# Patient Record
Sex: FEMALE | Race: WHITE | NOT HISPANIC OR LATINO | Employment: FULL TIME | URBAN - METROPOLITAN AREA
[De-identification: names, ages, dates, MRNs, and addresses within clinical notes are randomized per-mention and may not be internally consistent; named-entity substitution may affect disease eponyms.]

---

## 2017-05-05 ENCOUNTER — TRANSCRIBE ORDERS (OUTPATIENT)
Dept: MAMMOGRAPHY | Facility: HOSPITAL | Age: 52
End: 2017-05-05

## 2017-05-05 DIAGNOSIS — Z12.31 VISIT FOR SCREENING MAMMOGRAM: Primary | ICD-10-CM

## 2017-05-30 ENCOUNTER — HOSPITAL ENCOUNTER (OUTPATIENT)
Dept: MAMMOGRAPHY | Facility: HOSPITAL | Age: 52
Discharge: HOME | End: 2017-05-30
Attending: OBSTETRICS & GYNECOLOGY | Admitting: OBSTETRICS & GYNECOLOGY

## 2017-05-30 DIAGNOSIS — Z12.31 VISIT FOR SCREENING MAMMOGRAM: ICD-10-CM

## 2017-05-30 PROCEDURE — 77063 BREAST TOMOSYNTHESIS BI: CPT

## 2017-05-30 PROCEDURE — 77063 BREAST TOMOSYNTHESIS BI: CPT | Performed by: RADIOLOGY

## 2017-05-30 PROCEDURE — 77067 SCR MAMMO BI INCL CAD: CPT | Performed by: RADIOLOGY

## 2017-05-30 PROCEDURE — G0202 SCR MAMMO BI INCL CAD: HCPCS

## 2018-05-09 ENCOUNTER — TRANSCRIBE ORDERS (OUTPATIENT)
Dept: MAMMOGRAPHY | Facility: HOSPITAL | Age: 53
End: 2018-05-09

## 2018-05-09 DIAGNOSIS — Z12.31 VISIT FOR SCREENING MAMMOGRAM: Primary | ICD-10-CM

## 2018-05-31 ENCOUNTER — HOSPITAL ENCOUNTER (OUTPATIENT)
Dept: MAMMOGRAPHY | Facility: HOSPITAL | Age: 53
Discharge: HOME | End: 2018-05-31
Attending: OBSTETRICS & GYNECOLOGY | Admitting: OBSTETRICS & GYNECOLOGY

## 2018-05-31 DIAGNOSIS — Z12.31 VISIT FOR SCREENING MAMMOGRAM: ICD-10-CM

## 2018-05-31 PROCEDURE — 77063 BREAST TOMOSYNTHESIS BI: CPT | Performed by: RADIOLOGY

## 2018-05-31 PROCEDURE — 77067 SCR MAMMO BI INCL CAD: CPT | Performed by: RADIOLOGY

## 2018-05-31 PROCEDURE — 77063 BREAST TOMOSYNTHESIS BI: CPT

## 2018-05-31 PROCEDURE — 77067 SCR MAMMO BI INCL CAD: CPT

## 2019-06-05 ENCOUNTER — TRANSCRIBE ORDERS (OUTPATIENT)
Dept: ADMINISTRATIVE | Facility: HOSPITAL | Age: 54
End: 2019-06-05

## 2019-06-05 DIAGNOSIS — Z12.31 VISIT FOR SCREENING MAMMOGRAM: Primary | ICD-10-CM

## 2019-06-18 ENCOUNTER — HOSPITAL ENCOUNTER (OUTPATIENT)
Dept: MAMMOGRAPHY | Facility: HOSPITAL | Age: 54
Discharge: HOME | End: 2019-06-18
Admitting: OBSTETRICS & GYNECOLOGY

## 2019-06-18 DIAGNOSIS — Z12.31 VISIT FOR SCREENING MAMMOGRAM: ICD-10-CM

## 2019-06-18 PROCEDURE — 77063 BREAST TOMOSYNTHESIS BI: CPT | Performed by: RADIOLOGY

## 2019-06-18 PROCEDURE — 77067 SCR MAMMO BI INCL CAD: CPT | Performed by: RADIOLOGY

## 2019-06-18 PROCEDURE — 77063 BREAST TOMOSYNTHESIS BI: CPT

## 2019-06-18 PROCEDURE — 77067 SCR MAMMO BI INCL CAD: CPT

## 2019-09-10 ENCOUNTER — ANESTHESIA EVENT (OUTPATIENT)
Dept: PERIOP | Facility: HOSPITAL | Age: 54
End: 2019-09-10

## 2019-09-10 ENCOUNTER — PREP FOR SURGERY (OUTPATIENT)
Dept: OTHER | Facility: HOSPITAL | Age: 54
End: 2019-09-10

## 2019-09-10 ENCOUNTER — APPOINTMENT (OUTPATIENT)
Dept: PREADMISSION TESTING | Facility: HOSPITAL | Age: 54
End: 2019-09-10

## 2019-09-10 VITALS — BODY MASS INDEX: 37.94 KG/M2 | HEIGHT: 64 IN | WEIGHT: 222.22 LBS

## 2019-09-10 DIAGNOSIS — N94.6 DYSMENORRHEA: Primary | ICD-10-CM

## 2019-09-10 DIAGNOSIS — N94.6 DYSMENORRHEA: ICD-10-CM

## 2019-09-10 LAB
ABO GROUP BLD: NORMAL
ALBUMIN SERPL-MCNC: 4.3 G/DL (ref 3.5–5.2)
ALBUMIN/GLOB SERPL: 1.4 G/DL
ALP SERPL-CCNC: 122 U/L (ref 39–117)
ALT SERPL W P-5'-P-CCNC: 40 U/L (ref 1–33)
ANION GAP SERPL CALCULATED.3IONS-SCNC: 13 MMOL/L (ref 5–15)
AST SERPL-CCNC: 22 U/L (ref 1–32)
BASOPHILS # BLD AUTO: 0.02 10*3/MM3 (ref 0–0.2)
BASOPHILS NFR BLD AUTO: 0.2 % (ref 0–1.5)
BILIRUB SERPL-MCNC: 0.4 MG/DL (ref 0.2–1.2)
BLD GP AB SCN SERPL QL: NEGATIVE
BUN BLD-MCNC: 19 MG/DL (ref 6–20)
BUN/CREAT SERPL: 29.2 (ref 7–25)
CALCIUM SPEC-SCNC: 9.1 MG/DL (ref 8.6–10.5)
CHLORIDE SERPL-SCNC: 97 MMOL/L (ref 98–107)
CO2 SERPL-SCNC: 28 MMOL/L (ref 22–29)
CREAT BLD-MCNC: 0.65 MG/DL (ref 0.57–1)
DEPRECATED RDW RBC AUTO: 40.4 FL (ref 37–54)
EOSINOPHIL # BLD AUTO: 0.16 10*3/MM3 (ref 0–0.4)
EOSINOPHIL NFR BLD AUTO: 1.5 % (ref 0.3–6.2)
ERYTHROCYTE [DISTWIDTH] IN BLOOD BY AUTOMATED COUNT: 13 % (ref 12.3–15.4)
GFR SERPL CREATININE-BSD FRML MDRD: 96 ML/MIN/1.73
GLOBULIN UR ELPH-MCNC: 3 GM/DL
GLUCOSE BLD-MCNC: 109 MG/DL (ref 65–99)
HCG INTACT+B SERPL-ACNC: <0.5 MIU/ML
HCT VFR BLD AUTO: 44.1 % (ref 34–46.6)
HGB BLD-MCNC: 14.3 G/DL (ref 12–15.9)
IMM GRANULOCYTES # BLD AUTO: 0.05 10*3/MM3 (ref 0–0.05)
IMM GRANULOCYTES NFR BLD AUTO: 0.5 % (ref 0–0.5)
LYMPHOCYTES # BLD AUTO: 2.09 10*3/MM3 (ref 0.7–3.1)
LYMPHOCYTES NFR BLD AUTO: 20.2 % (ref 19.6–45.3)
MCH RBC QN AUTO: 27.8 PG (ref 26.6–33)
MCHC RBC AUTO-ENTMCNC: 32.4 G/DL (ref 31.5–35.7)
MCV RBC AUTO: 85.8 FL (ref 79–97)
MONOCYTES # BLD AUTO: 0.85 10*3/MM3 (ref 0.1–0.9)
MONOCYTES NFR BLD AUTO: 8.2 % (ref 5–12)
NEUTROPHILS # BLD AUTO: 7.16 10*3/MM3 (ref 1.7–7)
NEUTROPHILS NFR BLD AUTO: 69.4 % (ref 42.7–76)
NRBC BLD AUTO-RTO: 0 /100 WBC (ref 0–0.2)
PLATELET # BLD AUTO: 275 10*3/MM3 (ref 140–450)
PMV BLD AUTO: 9.8 FL (ref 6–12)
POTASSIUM BLD-SCNC: 4.1 MMOL/L (ref 3.5–5.2)
PROT SERPL-MCNC: 7.3 G/DL (ref 6–8.5)
RBC # BLD AUTO: 5.14 10*6/MM3 (ref 3.77–5.28)
RH BLD: POSITIVE
SODIUM BLD-SCNC: 138 MMOL/L (ref 136–145)
T&S EXPIRATION DATE: NORMAL
WBC NRBC COR # BLD: 10.33 10*3/MM3 (ref 3.4–10.8)

## 2019-09-10 PROCEDURE — 86901 BLOOD TYPING SEROLOGIC RH(D): CPT | Performed by: OBSTETRICS & GYNECOLOGY

## 2019-09-10 PROCEDURE — 86850 RBC ANTIBODY SCREEN: CPT | Performed by: OBSTETRICS & GYNECOLOGY

## 2019-09-10 PROCEDURE — 93005 ELECTROCARDIOGRAM TRACING: CPT

## 2019-09-10 PROCEDURE — 84702 CHORIONIC GONADOTROPIN TEST: CPT | Performed by: OBSTETRICS & GYNECOLOGY

## 2019-09-10 PROCEDURE — 80053 COMPREHEN METABOLIC PANEL: CPT | Performed by: OBSTETRICS & GYNECOLOGY

## 2019-09-10 PROCEDURE — 85025 COMPLETE CBC W/AUTO DIFF WBC: CPT | Performed by: OBSTETRICS & GYNECOLOGY

## 2019-09-10 PROCEDURE — 86900 BLOOD TYPING SEROLOGIC ABO: CPT | Performed by: OBSTETRICS & GYNECOLOGY

## 2019-09-10 PROCEDURE — 36415 COLL VENOUS BLD VENIPUNCTURE: CPT

## 2019-09-10 RX ORDER — SODIUM CHLORIDE 0.9 % (FLUSH) 0.9 %
3-10 SYRINGE (ML) INJECTION AS NEEDED
Status: CANCELLED | OUTPATIENT
Start: 2019-09-10

## 2019-09-10 RX ORDER — POTASSIUM CHLORIDE 1500 MG/1
20 TABLET, FILM COATED, EXTENDED RELEASE ORAL EVERY MORNING
COMMUNITY
End: 2022-08-19

## 2019-09-10 RX ORDER — UREA 10 %
LOTION (ML) TOPICAL
COMMUNITY

## 2019-09-10 RX ORDER — NAPROXEN 500 MG/1
500 TABLET ORAL 2 TIMES DAILY PRN
COMMUNITY
End: 2023-02-20 | Stop reason: SDUPTHER

## 2019-09-10 RX ORDER — SODIUM CHLORIDE 0.9 % (FLUSH) 0.9 %
3 SYRINGE (ML) INJECTION EVERY 12 HOURS SCHEDULED
Status: CANCELLED | OUTPATIENT
Start: 2019-09-10

## 2019-09-10 RX ORDER — CEFAZOLIN SODIUM 2 G/100ML
2 INJECTION, SOLUTION INTRAVENOUS ONCE
Status: CANCELLED | OUTPATIENT
Start: 2019-09-10 | End: 2019-09-10

## 2019-09-10 RX ORDER — ASPIRIN 81 MG/1
81 TABLET ORAL EVERY MORNING
COMMUNITY
End: 2019-09-12 | Stop reason: HOSPADM

## 2019-09-10 RX ORDER — CYCLOSPORINE 0.5 MG/ML
1 EMULSION OPHTHALMIC 2 TIMES DAILY
COMMUNITY

## 2019-09-10 RX ORDER — TRIAMTERENE AND HYDROCHLOROTHIAZIDE 37.5; 25 MG/1; MG/1
1 TABLET ORAL EVERY MORNING
COMMUNITY
End: 2022-08-09

## 2019-09-10 RX ORDER — ROPINIROLE 2 MG/1
2 TABLET, FILM COATED ORAL 2 TIMES DAILY
COMMUNITY
End: 2022-08-19 | Stop reason: SDUPTHER

## 2019-09-10 RX ORDER — OLOPATADINE HYDROCHLORIDE 2 MG/ML
1 SOLUTION/ DROPS OPHTHALMIC DAILY
COMMUNITY
End: 2022-08-19 | Stop reason: SDUPTHER

## 2019-09-10 RX ORDER — SODIUM CHLORIDE, SODIUM LACTATE, POTASSIUM CHLORIDE, CALCIUM CHLORIDE 600; 310; 30; 20 MG/100ML; MG/100ML; MG/100ML; MG/100ML
100 INJECTION, SOLUTION INTRAVENOUS CONTINUOUS
Status: CANCELLED | OUTPATIENT
Start: 2019-09-10

## 2019-09-10 NOTE — PAT
"Patient to apply Chlorhexadine wipes  to surgical area (as instructed) the night before procedure and the AM of procedure. Wipes provided.    Orders were for \"C/S\"- patient stated she is scheduled for a hysterectomy.  Chasidy was contacted in Dr. Mac's office.  Chasidy stated she will work on getting the orders corrected in Epic.  Note left on chart for patient to sign corrected consent in pre-op.   "

## 2019-09-11 ENCOUNTER — ANESTHESIA (OUTPATIENT)
Dept: PERIOP | Facility: HOSPITAL | Age: 54
End: 2019-09-11

## 2019-09-11 ENCOUNTER — HOSPITAL ENCOUNTER (OUTPATIENT)
Facility: HOSPITAL | Age: 54
Discharge: HOME | End: 2019-09-12
Attending: OBSTETRICS & GYNECOLOGY | Admitting: OBSTETRICS & GYNECOLOGY

## 2019-09-11 DIAGNOSIS — N80.9 ENDOMETRIOSIS: ICD-10-CM

## 2019-09-11 DIAGNOSIS — N94.6 DYSMENORRHEA: ICD-10-CM

## 2019-09-11 PROCEDURE — C1765 ADHESION BARRIER: HCPCS | Performed by: OBSTETRICS & GYNECOLOGY

## 2019-09-11 PROCEDURE — 25010000003 CEFAZOLIN IN DEXTROSE 2-4 GM/100ML-% SOLUTION: Performed by: OBSTETRICS & GYNECOLOGY

## 2019-09-11 PROCEDURE — 25010000002 HYDROMORPHONE PER 4 MG: Performed by: NURSE ANESTHETIST, CERTIFIED REGISTERED

## 2019-09-11 PROCEDURE — 25010000002 PROPOFOL 10 MG/ML EMULSION: Performed by: NURSE ANESTHETIST, CERTIFIED REGISTERED

## 2019-09-11 PROCEDURE — 25010000002 ONDANSETRON PER 1 MG: Performed by: NURSE ANESTHETIST, CERTIFIED REGISTERED

## 2019-09-11 PROCEDURE — 88307 TISSUE EXAM BY PATHOLOGIST: CPT | Performed by: OBSTETRICS & GYNECOLOGY

## 2019-09-11 PROCEDURE — 25010000002 KETOROLAC TROMETHAMINE PER 15 MG: Performed by: NURSE ANESTHETIST, CERTIFIED REGISTERED

## 2019-09-11 PROCEDURE — 25010000002 PROMETHAZINE PER 50 MG: Performed by: NURSE ANESTHETIST, CERTIFIED REGISTERED

## 2019-09-11 PROCEDURE — 25010000002 FENTANYL CITRATE (PF) 100 MCG/2ML SOLUTION: Performed by: NURSE ANESTHETIST, CERTIFIED REGISTERED

## 2019-09-11 PROCEDURE — 25010000002 HYDROMORPHONE HCL-NACL 30-0.9 MG/30ML-% SOLUTION PREFILLED SYRINGE: Performed by: OBSTETRICS & GYNECOLOGY

## 2019-09-11 PROCEDURE — 25010000003 LIDOCAINE 1 % SOLUTION: Performed by: NURSE ANESTHETIST, CERTIFIED REGISTERED

## 2019-09-11 PROCEDURE — 25010000002 DEXAMETHASONE PER 1 MG: Performed by: NURSE ANESTHETIST, CERTIFIED REGISTERED

## 2019-09-11 RX ORDER — SODIUM CHLORIDE 0.9 % (FLUSH) 0.9 %
3-10 SYRINGE (ML) INJECTION AS NEEDED
Status: DISCONTINUED | OUTPATIENT
Start: 2019-09-11 | End: 2019-09-11 | Stop reason: HOSPADM

## 2019-09-11 RX ORDER — PROMETHAZINE HYDROCHLORIDE 25 MG/1
25 SUPPOSITORY RECTAL ONCE AS NEEDED
Status: DISCONTINUED | OUTPATIENT
Start: 2019-09-11 | End: 2019-09-11 | Stop reason: HOSPADM

## 2019-09-11 RX ORDER — ONDANSETRON 2 MG/ML
4 INJECTION INTRAMUSCULAR; INTRAVENOUS EVERY 6 HOURS PRN
Status: DISCONTINUED | OUTPATIENT
Start: 2019-09-11 | End: 2019-09-12 | Stop reason: HOSPADM

## 2019-09-11 RX ORDER — SODIUM CHLORIDE, SODIUM LACTATE, POTASSIUM CHLORIDE, CALCIUM CHLORIDE 600; 310; 30; 20 MG/100ML; MG/100ML; MG/100ML; MG/100ML
100 INJECTION, SOLUTION INTRAVENOUS CONTINUOUS
Status: DISCONTINUED | OUTPATIENT
Start: 2019-09-11 | End: 2019-09-11 | Stop reason: HOSPADM

## 2019-09-11 RX ORDER — BUPIVACAINE HYDROCHLORIDE AND EPINEPHRINE 5; 5 MG/ML; UG/ML
INJECTION, SOLUTION PERINEURAL AS NEEDED
Status: DISCONTINUED | OUTPATIENT
Start: 2019-09-11 | End: 2019-09-11 | Stop reason: HOSPADM

## 2019-09-11 RX ORDER — TRIAMTERENE AND HYDROCHLOROTHIAZIDE 37.5; 25 MG/1; MG/1
1 TABLET ORAL EVERY MORNING
Status: DISCONTINUED | OUTPATIENT
Start: 2019-09-12 | End: 2019-09-12 | Stop reason: HOSPADM

## 2019-09-11 RX ORDER — NALOXONE HCL 0.4 MG/ML
0.4 VIAL (ML) INJECTION
Status: DISCONTINUED | OUTPATIENT
Start: 2019-09-11 | End: 2019-09-12 | Stop reason: HOSPADM

## 2019-09-11 RX ORDER — MAGNESIUM HYDROXIDE 1200 MG/15ML
LIQUID ORAL AS NEEDED
Status: DISCONTINUED | OUTPATIENT
Start: 2019-09-11 | End: 2019-09-11 | Stop reason: HOSPADM

## 2019-09-11 RX ORDER — SODIUM CHLORIDE, SODIUM LACTATE, POTASSIUM CHLORIDE, CALCIUM CHLORIDE 600; 310; 30; 20 MG/100ML; MG/100ML; MG/100ML; MG/100ML
9 INJECTION, SOLUTION INTRAVENOUS CONTINUOUS PRN
Status: DISCONTINUED | OUTPATIENT
Start: 2019-09-11 | End: 2019-09-11 | Stop reason: HOSPADM

## 2019-09-11 RX ORDER — IPRATROPIUM BROMIDE AND ALBUTEROL SULFATE 2.5; .5 MG/3ML; MG/3ML
3 SOLUTION RESPIRATORY (INHALATION) ONCE AS NEEDED
Status: DISCONTINUED | OUTPATIENT
Start: 2019-09-11 | End: 2019-09-11 | Stop reason: HOSPADM

## 2019-09-11 RX ORDER — OXYCODONE AND ACETAMINOPHEN 7.5; 325 MG/1; MG/1
2 TABLET ORAL EVERY 4 HOURS PRN
Status: DISCONTINUED | OUTPATIENT
Start: 2019-09-11 | End: 2019-09-12 | Stop reason: HOSPADM

## 2019-09-11 RX ORDER — MEPERIDINE HYDROCHLORIDE 25 MG/ML
12.5 INJECTION INTRAMUSCULAR; INTRAVENOUS; SUBCUTANEOUS
Status: DISCONTINUED | OUTPATIENT
Start: 2019-09-11 | End: 2019-09-11 | Stop reason: HOSPADM

## 2019-09-11 RX ORDER — NEOSTIGMINE METHYLSULFATE 5 MG/5 ML
SYRINGE (ML) INTRAVENOUS AS NEEDED
Status: DISCONTINUED | OUTPATIENT
Start: 2019-09-11 | End: 2019-09-11 | Stop reason: SURG

## 2019-09-11 RX ORDER — ROPINIROLE 2 MG/1
2 TABLET, FILM COATED ORAL 2 TIMES DAILY
Status: DISCONTINUED | OUTPATIENT
Start: 2019-09-11 | End: 2019-09-12 | Stop reason: HOSPADM

## 2019-09-11 RX ORDER — PROMETHAZINE HYDROCHLORIDE 25 MG/ML
12.5 INJECTION, SOLUTION INTRAMUSCULAR; INTRAVENOUS EVERY 6 HOURS PRN
Status: DISCONTINUED | OUTPATIENT
Start: 2019-09-11 | End: 2019-09-12 | Stop reason: HOSPADM

## 2019-09-11 RX ORDER — SODIUM CHLORIDE 0.9 % (FLUSH) 0.9 %
3 SYRINGE (ML) INJECTION EVERY 12 HOURS SCHEDULED
Status: DISCONTINUED | OUTPATIENT
Start: 2019-09-11 | End: 2019-09-11 | Stop reason: HOSPADM

## 2019-09-11 RX ORDER — KETOROLAC TROMETHAMINE 30 MG/ML
INJECTION, SOLUTION INTRAMUSCULAR; INTRAVENOUS AS NEEDED
Status: DISCONTINUED | OUTPATIENT
Start: 2019-09-11 | End: 2019-09-11 | Stop reason: SURG

## 2019-09-11 RX ORDER — ONDANSETRON 2 MG/ML
INJECTION INTRAMUSCULAR; INTRAVENOUS AS NEEDED
Status: DISCONTINUED | OUTPATIENT
Start: 2019-09-11 | End: 2019-09-11 | Stop reason: SURG

## 2019-09-11 RX ORDER — GLYCOPYRROLATE 0.2 MG/ML
INJECTION INTRAMUSCULAR; INTRAVENOUS AS NEEDED
Status: DISCONTINUED | OUTPATIENT
Start: 2019-09-11 | End: 2019-09-11 | Stop reason: SURG

## 2019-09-11 RX ORDER — CEFAZOLIN SODIUM 2 G/100ML
2 INJECTION, SOLUTION INTRAVENOUS ONCE
Status: COMPLETED | OUTPATIENT
Start: 2019-09-11 | End: 2019-09-11

## 2019-09-11 RX ORDER — HYDRALAZINE HYDROCHLORIDE 20 MG/ML
5 INJECTION INTRAMUSCULAR; INTRAVENOUS
Status: DISCONTINUED | OUTPATIENT
Start: 2019-09-11 | End: 2019-09-11 | Stop reason: HOSPADM

## 2019-09-11 RX ORDER — FENTANYL CITRATE 50 UG/ML
50 INJECTION, SOLUTION INTRAMUSCULAR; INTRAVENOUS
Status: DISCONTINUED | OUTPATIENT
Start: 2019-09-11 | End: 2019-09-11 | Stop reason: HOSPADM

## 2019-09-11 RX ORDER — PROMETHAZINE HYDROCHLORIDE 12.5 MG/1
12.5 SUPPOSITORY RECTAL EVERY 6 HOURS PRN
Status: DISCONTINUED | OUTPATIENT
Start: 2019-09-11 | End: 2019-09-12 | Stop reason: HOSPADM

## 2019-09-11 RX ORDER — FENTANYL CITRATE 50 UG/ML
INJECTION, SOLUTION INTRAMUSCULAR; INTRAVENOUS AS NEEDED
Status: DISCONTINUED | OUTPATIENT
Start: 2019-09-11 | End: 2019-09-11 | Stop reason: SURG

## 2019-09-11 RX ORDER — PROMETHAZINE HYDROCHLORIDE 12.5 MG/1
12.5 TABLET ORAL EVERY 6 HOURS PRN
Status: DISCONTINUED | OUTPATIENT
Start: 2019-09-11 | End: 2019-09-12 | Stop reason: HOSPADM

## 2019-09-11 RX ORDER — CYCLOSPORINE 0.5 MG/ML
1 EMULSION OPHTHALMIC 2 TIMES DAILY
Status: DISCONTINUED | OUTPATIENT
Start: 2019-09-11 | End: 2019-09-12 | Stop reason: HOSPADM

## 2019-09-11 RX ORDER — HYDROMORPHONE HYDROCHLORIDE 1 MG/ML
0.5 INJECTION, SOLUTION INTRAMUSCULAR; INTRAVENOUS; SUBCUTANEOUS
Status: DISCONTINUED | OUTPATIENT
Start: 2019-09-11 | End: 2019-09-12 | Stop reason: HOSPADM

## 2019-09-11 RX ORDER — NALOXONE HCL 0.4 MG/ML
0.1 VIAL (ML) INJECTION
Status: DISCONTINUED | OUTPATIENT
Start: 2019-09-11 | End: 2019-09-11 | Stop reason: HOSPADM

## 2019-09-11 RX ORDER — SODIUM CHLORIDE, SODIUM LACTATE, POTASSIUM CHLORIDE, CALCIUM CHLORIDE 600; 310; 30; 20 MG/100ML; MG/100ML; MG/100ML; MG/100ML
100 INJECTION, SOLUTION INTRAVENOUS CONTINUOUS
Status: DISCONTINUED | OUTPATIENT
Start: 2019-09-11 | End: 2019-09-12 | Stop reason: HOSPADM

## 2019-09-11 RX ORDER — DEXAMETHASONE SODIUM PHOSPHATE 4 MG/ML
INJECTION, SOLUTION INTRA-ARTICULAR; INTRALESIONAL; INTRAMUSCULAR; INTRAVENOUS; SOFT TISSUE AS NEEDED
Status: DISCONTINUED | OUTPATIENT
Start: 2019-09-11 | End: 2019-09-11 | Stop reason: SURG

## 2019-09-11 RX ORDER — BISACODYL 10 MG
10 SUPPOSITORY, RECTAL RECTAL DAILY PRN
Status: DISCONTINUED | OUTPATIENT
Start: 2019-09-11 | End: 2019-09-12 | Stop reason: HOSPADM

## 2019-09-11 RX ORDER — FAMOTIDINE 20 MG/1
20 TABLET, FILM COATED ORAL
Status: DISCONTINUED | OUTPATIENT
Start: 2019-09-11 | End: 2019-09-11 | Stop reason: HOSPADM

## 2019-09-11 RX ORDER — LIDOCAINE HYDROCHLORIDE 10 MG/ML
0.5 INJECTION, SOLUTION EPIDURAL; INFILTRATION; INTRACAUDAL; PERINEURAL ONCE AS NEEDED
Status: COMPLETED | OUTPATIENT
Start: 2019-09-11 | End: 2019-09-11

## 2019-09-11 RX ORDER — PHENAZOPYRIDINE HYDROCHLORIDE 100 MG/1
200 TABLET, FILM COATED ORAL ONCE
Status: COMPLETED | OUTPATIENT
Start: 2019-09-11 | End: 2019-09-11

## 2019-09-11 RX ORDER — PROMETHAZINE HYDROCHLORIDE 25 MG/ML
6.25 INJECTION, SOLUTION INTRAMUSCULAR; INTRAVENOUS ONCE AS NEEDED
Status: DISCONTINUED | OUTPATIENT
Start: 2019-09-11 | End: 2019-09-11 | Stop reason: HOSPADM

## 2019-09-11 RX ORDER — HYDROMORPHONE HCL 110MG/55ML
PATIENT CONTROLLED ANALGESIA SYRINGE INTRAVENOUS AS NEEDED
Status: DISCONTINUED | OUTPATIENT
Start: 2019-09-11 | End: 2019-09-11 | Stop reason: SURG

## 2019-09-11 RX ORDER — LABETALOL HYDROCHLORIDE 5 MG/ML
5 INJECTION, SOLUTION INTRAVENOUS
Status: DISCONTINUED | OUTPATIENT
Start: 2019-09-11 | End: 2019-09-11 | Stop reason: HOSPADM

## 2019-09-11 RX ORDER — ROCURONIUM BROMIDE 10 MG/ML
INJECTION, SOLUTION INTRAVENOUS AS NEEDED
Status: DISCONTINUED | OUTPATIENT
Start: 2019-09-11 | End: 2019-09-11 | Stop reason: SURG

## 2019-09-11 RX ORDER — PROPOFOL 10 MG/ML
VIAL (ML) INTRAVENOUS AS NEEDED
Status: DISCONTINUED | OUTPATIENT
Start: 2019-09-11 | End: 2019-09-11 | Stop reason: SURG

## 2019-09-11 RX ORDER — HYDROMORPHONE HYDROCHLORIDE 1 MG/ML
0.5 INJECTION, SOLUTION INTRAMUSCULAR; INTRAVENOUS; SUBCUTANEOUS
Status: DISCONTINUED | OUTPATIENT
Start: 2019-09-11 | End: 2019-09-11 | Stop reason: HOSPADM

## 2019-09-11 RX ORDER — PROMETHAZINE HYDROCHLORIDE 25 MG/ML
INJECTION, SOLUTION INTRAMUSCULAR; INTRAVENOUS AS NEEDED
Status: DISCONTINUED | OUTPATIENT
Start: 2019-09-11 | End: 2019-09-11 | Stop reason: SURG

## 2019-09-11 RX ORDER — ONDANSETRON 4 MG/1
4 TABLET, FILM COATED ORAL EVERY 6 HOURS PRN
Status: DISCONTINUED | OUTPATIENT
Start: 2019-09-11 | End: 2019-09-12 | Stop reason: HOSPADM

## 2019-09-11 RX ORDER — IBUPROFEN 400 MG/1
400 TABLET ORAL EVERY 6 HOURS PRN
Status: DISCONTINUED | OUTPATIENT
Start: 2019-09-11 | End: 2019-09-12 | Stop reason: HOSPADM

## 2019-09-11 RX ORDER — SODIUM CHLORIDE 9 MG/ML
INJECTION, SOLUTION INTRAVENOUS AS NEEDED
Status: DISCONTINUED | OUTPATIENT
Start: 2019-09-11 | End: 2019-09-11 | Stop reason: HOSPADM

## 2019-09-11 RX ORDER — LIDOCAINE HYDROCHLORIDE 10 MG/ML
INJECTION, SOLUTION INFILTRATION; PERINEURAL AS NEEDED
Status: DISCONTINUED | OUTPATIENT
Start: 2019-09-11 | End: 2019-09-11 | Stop reason: SURG

## 2019-09-11 RX ORDER — PROMETHAZINE HYDROCHLORIDE 25 MG/1
25 TABLET ORAL ONCE AS NEEDED
Status: DISCONTINUED | OUTPATIENT
Start: 2019-09-11 | End: 2019-09-11 | Stop reason: HOSPADM

## 2019-09-11 RX ADMIN — Medication 3 MG: at 14:38

## 2019-09-11 RX ADMIN — GLYCOPYRROLATE 0.4 MG: 0.2 INJECTION, SOLUTION INTRAMUSCULAR; INTRAVENOUS at 14:38

## 2019-09-11 RX ADMIN — FENTANYL CITRATE 100 MCG: 50 INJECTION, SOLUTION INTRAMUSCULAR; INTRAVENOUS at 12:04

## 2019-09-11 RX ADMIN — Medication: at 15:28

## 2019-09-11 RX ADMIN — CEFAZOLIN SODIUM 2 G: 2 INJECTION, SOLUTION INTRAVENOUS at 12:00

## 2019-09-11 RX ADMIN — PHENAZOPYRIDINE HYDROCHLORIDE 200 MG: 100 TABLET ORAL at 11:29

## 2019-09-11 RX ADMIN — GLYCOPYRROLATE 0.2 MG: 0.2 INJECTION, SOLUTION INTRAMUSCULAR; INTRAVENOUS at 12:37

## 2019-09-11 RX ADMIN — CYCLOSPORINE 1 DROP: 0.5 EMULSION OPHTHALMIC at 20:41

## 2019-09-11 RX ADMIN — ROCURONIUM BROMIDE 10 MG: 10 INJECTION INTRAVENOUS at 13:06

## 2019-09-11 RX ADMIN — SODIUM CHLORIDE, POTASSIUM CHLORIDE, SODIUM LACTATE AND CALCIUM CHLORIDE 100 ML/HR: 600; 310; 30; 20 INJECTION, SOLUTION INTRAVENOUS at 17:56

## 2019-09-11 RX ADMIN — FAMOTIDINE 20 MG: 20 TABLET ORAL at 10:12

## 2019-09-11 RX ADMIN — HYDROMORPHONE HYDROCHLORIDE 0.5 MG: 2 INJECTION, SOLUTION INTRAMUSCULAR; INTRAVENOUS; SUBCUTANEOUS at 14:21

## 2019-09-11 RX ADMIN — ROCURONIUM BROMIDE 10 MG: 10 INJECTION INTRAVENOUS at 13:51

## 2019-09-11 RX ADMIN — GLYCOPYRROLATE 0.2 MG: 0.2 INJECTION, SOLUTION INTRAMUSCULAR; INTRAVENOUS at 12:32

## 2019-09-11 RX ADMIN — ROCURONIUM BROMIDE 50 MG: 10 INJECTION INTRAVENOUS at 12:04

## 2019-09-11 RX ADMIN — PROPOFOL 175 MG: 10 INJECTION, EMULSION INTRAVENOUS at 12:04

## 2019-09-11 RX ADMIN — ONDANSETRON 4 MG: 2 INJECTION INTRAMUSCULAR; INTRAVENOUS at 14:21

## 2019-09-11 RX ADMIN — LIDOCAINE HYDROCHLORIDE 50 MG: 10 INJECTION, SOLUTION INFILTRATION; PERINEURAL at 12:04

## 2019-09-11 RX ADMIN — SODIUM CHLORIDE, POTASSIUM CHLORIDE, SODIUM LACTATE AND CALCIUM CHLORIDE 100 ML/HR: 600; 310; 30; 20 INJECTION, SOLUTION INTRAVENOUS at 10:12

## 2019-09-11 RX ADMIN — ROPINIROLE 2 MG: 2 TABLET, FILM COATED ORAL at 20:41

## 2019-09-11 RX ADMIN — PROMETHAZINE HYDROCHLORIDE 5 MG: 25 INJECTION INTRAMUSCULAR; INTRAVENOUS at 14:40

## 2019-09-11 RX ADMIN — METHYLENE BLUE 10 ML: 10 INJECTION INTRAVENOUS at 14:17

## 2019-09-11 RX ADMIN — FENTANYL CITRATE 100 MCG: 50 INJECTION, SOLUTION INTRAMUSCULAR; INTRAVENOUS at 14:41

## 2019-09-11 RX ADMIN — SODIUM CHLORIDE, POTASSIUM CHLORIDE, SODIUM LACTATE AND CALCIUM CHLORIDE: 600; 310; 30; 20 INJECTION, SOLUTION INTRAVENOUS at 12:15

## 2019-09-11 RX ADMIN — PROPOFOL 25 MCG/KG/MIN: 10 INJECTION, EMULSION INTRAVENOUS at 12:09

## 2019-09-11 RX ADMIN — HYDROMORPHONE HYDROCHLORIDE 0.5 MG: 2 INJECTION, SOLUTION INTRAMUSCULAR; INTRAVENOUS; SUBCUTANEOUS at 12:20

## 2019-09-11 RX ADMIN — DEXAMETHASONE SODIUM PHOSPHATE 8 MG: 4 INJECTION, SOLUTION INTRAMUSCULAR; INTRAVENOUS at 12:08

## 2019-09-11 RX ADMIN — LIDOCAINE HYDROCHLORIDE 0.5 ML: 10 INJECTION, SOLUTION EPIDURAL; INFILTRATION; INTRACAUDAL; PERINEURAL at 10:12

## 2019-09-11 RX ADMIN — KETOROLAC TROMETHAMINE 30 MG: 30 INJECTION, SOLUTION INTRAMUSCULAR at 14:38

## 2019-09-11 NOTE — ANESTHESIA PROCEDURE NOTES
Airway  Urgency: elective    Date/Time: 9/11/2019 12:06 PM  Airway not difficult    General Information and Staff    Patient location during procedure: OR  CRNA: Salud Salgado CRNA    Indications and Patient Condition  Indications for airway management: airway protection    Preoxygenated: yes  MILS not maintained throughout  Mask difficulty assessment: 2 - vent by mask + OA or adjuvant +/- NMBA    Final Airway Details  Final airway type: endotracheal airway      Successful airway: ETT  Cuffed: yes   Successful intubation technique: direct laryngoscopy  Facilitating devices/methods: intubating stylet  Endotracheal tube insertion site: oral  Blade: Eligio  Blade size: 3  ETT size (mm): 7.0  Cormack-Lehane Classification: grade I - full view of glottis  Placement verified by: chest auscultation and capnometry   Cuff volume (mL): 6  Measured from: lips  ETT/EBT  to lips (cm): 20  Number of attempts at approach: 1    Additional Comments  Patient history, labs, physical exam, anesthetic plan reviewed with MDA and patient in preop.  Pt transported to room via RN. All ASA monitors applied, preoxygenated x 3 min/ ETO2 of >85, IV patent, smooth induction, easy intubation, atraumatic, dentition/soft tissue intact, + ETCO2, tube secured, all VSS, cspine neutrality maintained throughout induction, intubation and postitioning, all ppp, arms <90.

## 2019-09-11 NOTE — ANESTHESIA PREPROCEDURE EVALUATION
Anesthesia Evaluation     Patient summary reviewed and Nursing notes reviewed   NPO Solid Status: > 8 hours  NPO Liquid Status: > 8 hours           Airway   Mallampati: II  TM distance: >3 FB  Neck ROM: full  No difficulty expected  Dental      Pulmonary    (+) a smoker (89) Former,   (-) COPD, asthma, shortness of breathRecent URI:          Sleep apnea: s/p UP3 sx.  Cardiovascular     ECG reviewed    (+) hypertension,   (-) past MI, dysrhythmias, angina    ROS comment: EKG NSR    Neuro/Psych  (-) seizures, CVA  GI/Hepatic/Renal/Endo    (+) obesity,     (-) liver disease, no renal disease, diabetes, hypothyroidism    Musculoskeletal     Abdominal    Substance History      OB/GYN      Comment: PCOS      Other                        Anesthesia Plan    ASA 2     general   (Propofol Infusion as part of Anti PONV tech )  intravenous induction   Anesthetic plan, all risks, benefits, and alternatives have been provided, discussed and informed consent has been obtained with: patient.    Plan discussed with CRNA.

## 2019-09-11 NOTE — H&P
Alia Garza  : 1965  MRN: [id number]  [facility]: [id number]    History and Physical    Subjective   Alia Garza is a 53 y.o. year old  who present for surgery due to history of endometriosis pelvic pain and dysmenorrhea.  She desires a hysterectomy removal of both ovaries.  Other options were explained and she persisted in her desire..    There is no problem list on file for this patient.    Past Medical History:   Diagnosis Date   • Endometriosis    • Hypertension    • Low iron    • Polycystic disease, ovaries    • Prediabetes     diet controlled   • Sleep apnea      Past Surgical History:   Procedure Laterality Date   • BLADDER SURGERY      bladder rectum tacted    • CARPAL TUNNEL RELEASE Right    • COLONOSCOPY  2018   • EXPLORATORY LAPAROTOMY     • LASIK     • TONSILLECTOMY       Social History     Socioeconomic History   • Marital status:      Spouse name: Not on file   • Number of children: Not on file   • Years of education: Not on file   • Highest education level: Not on file   Tobacco Use   • Smoking status: Former Smoker     Packs/day: 0.50     Years: 3.00     Pack years: 1.50     Types: Cigarettes     Last attempt to quit: 9/10/1989     Years since quittin.0   • Smokeless tobacco: Never Used   Substance and Sexual Activity   • Alcohol use: No     Frequency: Never   • Drug use: No   • Sexual activity: Defer     No current facility-administered medications for this encounter.     Current Outpatient Medications:   •  aspirin 81 MG EC tablet, Take 81 mg by mouth Every Morning., Disp: , Rfl:   •  cycloSPORINE (RESTASIS) 0.05 % ophthalmic emulsion, Administer 1 drop to both eyes 2 (Two) Times a Day., Disp: , Rfl:   •  ferrous sulfate 140 (45 Fe) MG tablet controlled-release tablet, Take  by mouth Daily With Breakfast., Disp: , Rfl:   •  naproxen (NAPROSYN) 500 MG tablet, Take 500 mg by mouth 2 (Two) Times a Day As Needed for Mild Pain  (neck inflammation)., Disp: , Rfl:   •  NON  FORMULARY, Inject  under the skin into the appropriate area as directed 2 (Two) Times a Week. TUESDAYS AND THURSDAYS; Allergy shots, Disp: , Rfl:   •  olopatadine (PATADAY) 0.2 % solution ophthalmic solution, Administer 1 drop to both eyes Daily., Disp: , Rfl:   •  potassium chloride ER (K-TAB) 20 MEQ tablet controlled-release ER tablet, Take 20 mEq by mouth Every Morning., Disp: , Rfl:   •  rOPINIRole (REQUIP) 2 MG tablet, Take 2 mg by mouth 2 (Two) Times a Day., Disp: , Rfl:   •  triamterene-hydrochlorothiazide (MAXZIDE-25) 37.5-25 MG per tablet, Take 1 tablet by mouth Every Morning., Disp: , Rfl:     Allergies   Allergen Reactions   • Ace Inhibitors Swelling     Swelling of lips, patient states her breathing had not been effected        Review of Systems      Objective   There were no vitals taken for this visit.  General: well developed; well nourished  no acute distress   Heart: Not performed.   Lungs: breathing is unlabored   Abdomen: soft, non-tender; no masses  no umbilical or inguinal hernias are present  no hepato-splenomegaly   Pelvis:: Clinical staff was present for exam   Labs  Lab Results   Component Value Date     09/10/2019    HGB 14.3 09/10/2019    HCT 44.1 09/10/2019    WBC 10.33 09/10/2019     09/10/2019    K 4.1 09/10/2019    CL 97 (L) 09/10/2019    CO2 28.0 09/10/2019    BUN 19 09/10/2019    CREATININE 0.65 09/10/2019    GLUCOSE 109 (H) 09/10/2019    ALBUMIN 4.30 09/10/2019    CALCIUM 9.1 09/10/2019    AST 22 09/10/2019    ALT 40 (H) 09/10/2019    BILITOT 0.4 09/10/2019        Assessment   1. Endometriosis and dysmenorrhea.     Plan   1. Total laparoscopic hysterectomy bilateral salpingo-oophorectomy.  Risks explained options understood.    Faye Mac MD  9/11/2019  8:06 AM

## 2019-09-11 NOTE — ANESTHESIA POSTPROCEDURE EVALUATION
Patient: Alia Garza    Procedure Summary     Date:  09/11/19 Room / Location:   [facility]    Anesthesia Start:  1200 Anesthesia Stop:      Procedure:  TOTAL LAPAROSCOPIC HYSTERECTOMY BILATERAL SALPINGECTOMY,  POSSIBLE OOPHORECTOMY (N/A Abdomen) Diagnosis:      Surgeon:  Faye Mac MD Provider:  Glenn Martin MD    Anesthesia Type:  general ASA Status:  2          Anesthesia Type: general  Last vitals  /45   Temp 98   Pulse 88   Resp 15   SpO2 93     Post Anesthesia Care and Evaluation    Patient location during evaluation: PACU  Patient participation: complete - patient participated  Level of consciousness: awake and alert  Pain score: 0  Pain management: adequate  Airway patency: patent  Anesthetic complications: No anesthetic complications  PONV Status: none  Cardiovascular status: hemodynamically stable and acceptable  Respiratory status: nonlabored ventilation, acceptable and nasal cannula  Hydration status: acceptable

## 2019-09-11 NOTE — PLAN OF CARE
Problem: Patient Care Overview  Goal: Plan of Care Review  Outcome: Ongoing (interventions implemented as appropriate)   09/11/19 3993   Coping/Psychosocial   Plan of Care Reviewed With patient   Plan of Care Review   Progress improving   OTHER   Outcome Summary Pt. settled in room with PCA pump and entitled CO2 monitoring     Goal: Individualization and Mutuality  Outcome: Ongoing (interventions implemented as appropriate)    Goal: Discharge Needs Assessment  Outcome: Ongoing (interventions implemented as appropriate)    Goal: Interprofessional Rounds/Family Conf  Outcome: Ongoing (interventions implemented as appropriate)      Problem: Surgery Nonspecified (Adult)  Goal: Signs and Symptoms of Listed Potential Problems Will be Absent, Minimized or Managed (Surgery Nonspecified)  Outcome: Ongoing (interventions implemented as appropriate)    Goal: Anesthesia/Sedation Recovery  Outcome: Ongoing (interventions implemented as appropriate)      Problem: Pain, Acute (Adult)  Goal: Identify Related Risk Factors and Signs and Symptoms  Outcome: Ongoing (interventions implemented as appropriate)    Goal: Acceptable Pain Control/Comfort Level  Outcome: Ongoing (interventions implemented as appropriate)

## 2019-09-11 NOTE — OP NOTE
TOTAL LAPAROSCOPIC HYSTERECTOMY BILATERAL SALPINGO OOPHORECTOMY  Procedure Note    Alia Garza  9/11/2019    Pre-op Diagnosis:   * No pre-op diagnosis entered *endometriosis.    Post-op Diagnosis:     * No Diagnosis Codes entered *same    Procedure(s):  TOTAL LAPAROSCOPIC HYSTERECTOMY BILATERAL SALPINGECTOMY,  POSSIBLE OOPHORECTOMY cystoscopy    Surgeon(s):  Faye Mac MD Simms, Lynne O, MD    Anesthesia: General    Staff:   Circulator: Lacie Reeder RN; Eloise Benjamin RN; Karolyn Guerrero RN  Scrub Person: Crista Estrada; Tammie Alonzo  Nursing Assistant: Britney Goldman    Estimated Blood Loss: 100 mL    Specimens:                  Order Name Source Comment Collection Info Order Time   TISSUE PATHOLOGY EXAM Uterus with Cervix, Bilateral Tubes and Ovaries  Collected By: Faye Mac MD 9/11/2019  1:21 PM         Drains:   Urethral Catheter (Active)   Daily Indications Required activity restriction from trauma, surgery, (e.g. unstable spine, fracture, hemodynamics) 9/11/2019  2:57 PM       Indications: Persistent painful periods and pelvic pain.    Findings: Endometrioses cul-de-sac normal uterus and ovaries.    Operative procedure: Patient was counseled about having a hysterectomy removing both ovaries which she wanted to do.  She understood the risks and complications of such a procedure including the possibility of bleeding infection damage to bowel bladder and ureter as well as postop issues like blood clots hematomas pneumonias.  She was found to have normal hematocrit and negative hCG she was taken to the OR after adequate prepping draping and general endotracheal anesthesia a weighted vaginal speculum was inserted.  Cervix was dressing to tenaculum and the bladder was drained with a catheter.  Uterus sounded to 8 cm and after adequate endocervical dilation a #8 Susan apparatus with covering was inserted into the cervical opening stitch to the cervix.  Infraumbilical incision made with a knife  followed placement of the Optiview trocar under direct visualization lap scopic vision.  It was noted that the uterus tubes and ovaries were down in the pelvis deeply but they were normal size.  She had a large amount of omentum and bowels that were very difficult to remove out of the pelvis to be able to see the case.  We made a left lower quadrant 12 trocar incision and inserted the trocar with a direct vision of the laparoscopic vision.  Putting a 5 mm trocar in the right lower quadrant under direct vision of the scope.  Put in a separate 10 mm trocar in the left lower quadrant just lateral to the umbilical incision.  We placed this trocar under vision of the laparoscope and use the fan instrument to be able to hold the bowels up out of the way.  We then used Ace harmonic to create pedicles starting with the infundibulopelvic ligaments bilaterally continuing until we got down to the uterine vessel and clot cauterizing those as well.  We then skeletonized the bladder down away from the cervix and circumferentially incised vaginal cuff with Ace harmonic until the uterus tubes and ovaries were released and pulled through the vagina.  We then sewed the vaginal cuff up with 2 layers of 0 Vicryl running lock stitch.  RYAN stasis was made adequate.  Cystoscopy was done revealing a normal bladder and patent ureters bilaterally.  Once this was done we reinspected the pelvis put Interceed over the vaginal cuff to make sure hemostasis was adequate removed all trochars closed the to the left lower quadrant and left mid quadrant incisions with Optiview trocar closure with 0 Vicryl stitch.  The rest of the trochars were pulled out and as well as the gas removed and the incisions were closed 3-0 Vicryl subcuticular stitch.  The umbilical incision was also closed with the fascia with a 0 Vicryl.  Pictures have been taken and patient was awakened taken to recovery good condition blood loss was maybe 50 cc tar procedure very well  her family was made aware of her progress and hopefully she will do well thank you.  Of note there was several small spots of endometriosis in the cul-de-sac which were cauterized.    Complications: None      Faye Mac MD     Date: 9/11/2019  Time: 3:07 PM

## 2019-09-12 VITALS
RESPIRATION RATE: 18 BRPM | TEMPERATURE: 97.8 F | HEART RATE: 73 BPM | OXYGEN SATURATION: 97 % | SYSTOLIC BLOOD PRESSURE: 109 MMHG | WEIGHT: 222.22 LBS | HEIGHT: 64 IN | DIASTOLIC BLOOD PRESSURE: 70 MMHG | BODY MASS INDEX: 37.94 KG/M2

## 2019-09-12 LAB
ALBUMIN SERPL-MCNC: 3.3 G/DL (ref 3.5–5.2)
ALBUMIN/GLOB SERPL: 1 G/DL
ALP SERPL-CCNC: 94 U/L (ref 39–117)
ALT SERPL W P-5'-P-CCNC: 39 U/L (ref 1–33)
ANION GAP SERPL CALCULATED.3IONS-SCNC: 12 MMOL/L (ref 5–15)
AST SERPL-CCNC: 27 U/L (ref 1–32)
BILIRUB SERPL-MCNC: 0.4 MG/DL (ref 0.2–1.2)
BUN BLD-MCNC: 12 MG/DL (ref 6–20)
BUN/CREAT SERPL: 19.4 (ref 7–25)
CALCIUM SPEC-SCNC: 8.9 MG/DL (ref 8.6–10.5)
CHLORIDE SERPL-SCNC: 101 MMOL/L (ref 98–107)
CO2 SERPL-SCNC: 25 MMOL/L (ref 22–29)
CREAT BLD-MCNC: 0.62 MG/DL (ref 0.57–1)
CYTO UR: NORMAL
DEPRECATED RDW RBC AUTO: 43.1 FL (ref 37–54)
ERYTHROCYTE [DISTWIDTH] IN BLOOD BY AUTOMATED COUNT: 13.4 % (ref 12.3–15.4)
GFR SERPL CREATININE-BSD FRML MDRD: 101 ML/MIN/1.73
GLOBULIN UR ELPH-MCNC: 3.2 GM/DL
GLUCOSE BLD-MCNC: 129 MG/DL (ref 65–99)
HCT VFR BLD AUTO: 38.3 % (ref 34–46.6)
HGB BLD-MCNC: 12 G/DL (ref 12–15.9)
LAB AP CASE REPORT: NORMAL
LAB AP CLINICAL INFORMATION: NORMAL
MCH RBC QN AUTO: 27.3 PG (ref 26.6–33)
MCHC RBC AUTO-ENTMCNC: 31.3 G/DL (ref 31.5–35.7)
MCV RBC AUTO: 87.2 FL (ref 79–97)
PATH REPORT.FINAL DX SPEC: NORMAL
PATH REPORT.GROSS SPEC: NORMAL
PLATELET # BLD AUTO: 246 10*3/MM3 (ref 140–450)
PMV BLD AUTO: 10 FL (ref 6–12)
POTASSIUM BLD-SCNC: 4 MMOL/L (ref 3.5–5.2)
PROT SERPL-MCNC: 6.5 G/DL (ref 6–8.5)
RBC # BLD AUTO: 4.39 10*6/MM3 (ref 3.77–5.28)
SODIUM BLD-SCNC: 138 MMOL/L (ref 136–145)
WBC NRBC COR # BLD: 13.83 10*3/MM3 (ref 3.4–10.8)

## 2019-09-12 PROCEDURE — 85027 COMPLETE CBC AUTOMATED: CPT | Performed by: OBSTETRICS & GYNECOLOGY

## 2019-09-12 PROCEDURE — 80053 COMPREHEN METABOLIC PANEL: CPT | Performed by: OBSTETRICS & GYNECOLOGY

## 2019-09-12 RX ORDER — OXYCODONE AND ACETAMINOPHEN 7.5; 325 MG/1; MG/1
2 TABLET ORAL EVERY 4 HOURS PRN
Qty: 24 TABLET | Refills: 0 | Status: SHIPPED | OUTPATIENT
Start: 2019-09-12 | End: 2022-08-19

## 2019-09-12 RX ORDER — IBUPROFEN 600 MG/1
600 TABLET ORAL EVERY 6 HOURS PRN
Qty: 16 TABLET | Refills: 2 | Status: SHIPPED | OUTPATIENT
Start: 2019-09-12 | End: 2021-09-23

## 2019-09-12 RX ADMIN — ROPINIROLE 2 MG: 2 TABLET, FILM COATED ORAL at 08:39

## 2019-09-12 RX ADMIN — SODIUM CHLORIDE, POTASSIUM CHLORIDE, SODIUM LACTATE AND CALCIUM CHLORIDE 100 ML/HR: 600; 310; 30; 20 INJECTION, SOLUTION INTRAVENOUS at 03:29

## 2019-09-12 RX ADMIN — CYCLOSPORINE 1 DROP: 0.5 EMULSION OPHTHALMIC at 08:39

## 2019-09-12 RX ADMIN — TRIAMTERENE AND HYDROCHLOROTHIAZIDE 1 TABLET: 37.5; 25 TABLET ORAL at 08:39

## 2019-09-12 RX ADMIN — OXYCODONE HYDROCHLORIDE AND ACETAMINOPHEN 2 TABLET: 7.5; 325 TABLET ORAL at 11:20

## 2019-09-12 NOTE — PLAN OF CARE
Problem: Patient Care Overview  Goal: Plan of Care Review  Outcome: Ongoing (interventions implemented as appropriate)   09/12/19 0538   Coping/Psychosocial   Plan of Care Reviewed With patient   Plan of Care Review   Progress improving   OTHER   Outcome Summary pca pain pump in use. no c/o breakthru pain.to monitir      09/12/19 0538   Coping/Psychosocial   Plan of Care Reviewed With patient   Plan of Care Review   Progress improving   OTHER   Outcome Summary pca pain pump in use. no c/o breakthru pain.to monitir     Goal: Individualization and Mutuality  Outcome: Ongoing (interventions implemented as appropriate)    Goal: Discharge Needs Assessment  Outcome: Ongoing (interventions implemented as appropriate)    Goal: Interprofessional Rounds/Family Conf  Outcome: Ongoing (interventions implemented as appropriate)      Problem: Pain, Acute (Adult)  Goal: Identify Related Risk Factors and Signs and Symptoms  Outcome: Ongoing (interventions implemented as appropriate)    Goal: Acceptable Pain Control/Comfort Level  Outcome: Ongoing (interventions implemented as appropriate)

## 2019-09-12 NOTE — PROGRESS NOTES
Discharge Planning Assessment  [facility]     Patient Name: Alia Garza  MRN: [id number]  Today's Date: 9/12/2019    Admit Date: 9/11/2019    Discharge Needs Assessment     Row Name 09/12/19 1154       Living Environment    Lives With  child(rodney), adult;child(rodney), dependent;spouse    Name(s) of Who Lives With Patient  spouse is Charlie Garza    Current Living Arrangements  home/apartment/condo    Primary Care Provided by  self    Provides Primary Care For  no one    Family Caregiver if Needed  spouse    Quality of Family Relationships  supportive    Able to Return to Prior Arrangements  yes       Resource/Environmental Concerns    Resource/Environmental Concerns  none    Transportation Concerns  car, none       Transition Planning    Patient/Family Anticipates Transition to  home with family    Patient/Family Anticipated Services at Transition  none    Transportation Anticipated  family or friend will provide mother to drive her home       Discharge Needs Assessment    Readmission Within the Last 30 Days  no previous admission in last 30 days    Concerns to be Addressed  no discharge needs identified    Equipment Currently Used at Home  none    Anticipated Changes Related to Illness  none    Equipment Needed After Discharge  none        Discharge Plan     Row Name 09/12/19 1150       Plan    Plan  Home with family    Patient/Family in Agreement with Plan  yes    Plan Comments  I spoke with patient this am. No discharge planning needs identified.     Final Discharge Disposition Code  01 - home or self-care    Row Name 09/12/19 0901       Plan    Final Discharge Disposition Code  01 - home or self-care        Destination      No service coordination in this encounter.      Durable Medical Equipment      No service coordination in this encounter.      Dialysis/Infusion      No service coordination in this encounter.      Home Medical Care      No service coordination in this encounter.      Therapy      No service  coordination in this encounter.      Community Resources      No service coordination in this encounter.        Expected Discharge Date and Time     Expected Discharge Date Expected Discharge Time    Sep 12, 2019         Demographic Summary     Row Name 09/12/19 1153       General Information    Referral Source  admission list    Preferred Language  English    General Information Comments  PCP is Jhon Edmond       Contact Information    Permission Granted to Share Info With      Contact Information Comments  [phone]        Functional Status     Row Name 09/12/19 1153       Functional Status    Usual Activity Tolerance  excellent    Current Activity Tolerance  excellent       Functional Status, IADL    Medications  independent    Meal Preparation  independent    Housekeeping  independent    Laundry  independent    Shopping  independent       Employment/    Employment/ Comments  Has [employer] Insurance, no concerns voiced with her coverage        Psychosocial    No documentation.       Abuse/Neglect    No documentation.       Legal    No documentation.       Substance Abuse    No documentation.       Patient Forms    No documentation.           Yaneth Butt RN

## 2019-09-12 NOTE — DISCHARGE SUMMARY
Saul Garza  : 1965  MRN: [id number]  CSN: [id number]    Discharge Summary      Date of Admission: 2019   Date of Discharge:    Discharge Diagnosis: 1.  Endometriosis   Procedures Performed: Procedure(s):  TOTAL LAPAROSCOPIC HYSTERECTOMY BILATERAL SALPINGECTOMY, OOPHORECTOMY, CYSTOSCOPY      Consults:  None   Brief History: Patient is a 53 y.o.who presented pelvic pain.   Hospital Course:  Patient underwent [facility] BSO without problems.   Pending Studies:  None   Condition at discharge: gradually improving   Discharge Medications:    Your medication list      START taking these medications      Instructions Last Dose Given Next Dose Due   ibuprofen 400 MG tablet  Commonly known as:  ADVIL,MOTRIN      Take 1.5 tablets by mouth Every 6 (Six) Hours As Needed for Mild Pain .       oxyCODONE-acetaminophen 7.5-325 MG per tablet  Commonly known as:  PERCOCET      Take 2 tablets by mouth Every 4 (Four) Hours As Needed for Severe Pain  for up to 9 days.          CONTINUE taking these medications      Instructions Last Dose Given Next Dose Due   cycloSPORINE 0.05 % ophthalmic emulsion  Commonly known as:  RESTASIS      Administer 1 drop to both eyes 2 (Two) Times a Day.       ferrous sulfate 140 (45 Fe) MG tablet controlled-release tablet      Take  by mouth Daily With Breakfast.       naproxen 500 MG tablet  Commonly known as:  NAPROSYN      Take 500 mg by mouth 2 (Two) Times a Day As Needed for Mild Pain  (neck inflammation).       NON FORMULARY      Inject  under the skin into the appropriate area as directed 2 (Two) Times a Week.  AND ; Allergy shots       PATADAY 0.2 % solution ophthalmic solution  Generic drug:  olopatadine      Administer 1 drop to both eyes Daily.       potassium chloride ER 20 MEQ tablet controlled-release ER tablet  Commonly known as:  K-TAB      Take 20 mEq by mouth Every Morning.       rOPINIRole 2 MG tablet  Commonly known as:  REQUIP      Take 2 mg by  mouth 2 (Two) Times a Day.       triamterene-hydrochlorothiazide 37.5-25 MG per tablet  Commonly known as:  MAXZIDE-25      Take 1 tablet by mouth Every Morning.          STOP taking these medications    aspirin 81 MG EC tablet              Where to Get Your Medications      These medications were sent to [facility] - [place] [id number]    Hours:  7:00 AM-5:30 PM M-F, 8:00 AM-4:30 PM Sat-Sun Phone:  [phone] ·   ibuprofen 400 MG tablet     You can get these medications from any pharmacy    Bring a paper prescription for each of these medications  · oxyCODONE-acetaminophen 7.5-325 MG per tablet        Discharge Disposition: home   Follow-up: No future appointments.         This note has been electronically signed.    Faye Mac MD  September 12, 2019

## 2020-07-13 ENCOUNTER — TRANSCRIBE ORDERS (OUTPATIENT)
Dept: MAMMOGRAPHY | Facility: HOSPITAL | Age: 55
End: 2020-07-13

## 2020-07-13 DIAGNOSIS — Z12.31 VISIT FOR SCREENING MAMMOGRAM: Primary | ICD-10-CM

## 2020-07-29 ENCOUNTER — HOSPITAL ENCOUNTER (OUTPATIENT)
Dept: MAMMOGRAPHY | Facility: HOSPITAL | Age: 55
Discharge: HOME | End: 2020-07-29
Admitting: OBSTETRICS & GYNECOLOGY

## 2020-07-29 DIAGNOSIS — Z12.31 VISIT FOR SCREENING MAMMOGRAM: ICD-10-CM

## 2020-07-29 PROCEDURE — 77063 BREAST TOMOSYNTHESIS BI: CPT

## 2020-07-29 PROCEDURE — 77067 SCR MAMMO BI INCL CAD: CPT

## 2020-07-29 PROCEDURE — 77067 SCR MAMMO BI INCL CAD: CPT | Performed by: RADIOLOGY

## 2020-07-29 PROCEDURE — 77063 BREAST TOMOSYNTHESIS BI: CPT | Performed by: RADIOLOGY

## 2021-09-17 RX ORDER — ESTRADIOL 0.1 MG/D
PATCH, EXTENDED RELEASE TRANSDERMAL
Qty: 8 PATCH | Refills: 0 | OUTPATIENT
Start: 2021-09-17

## 2021-09-21 ENCOUNTER — TELEPHONE (OUTPATIENT)
Dept: OBSTETRICS AND GYNECOLOGY | Facility: CLINIC | Age: 56
End: 2021-09-21

## 2021-09-21 RX ORDER — ESTRADIOL 0.1 MG/D
1 FILM, EXTENDED RELEASE TRANSDERMAL 2 TIMES WEEKLY
Qty: 1 EACH | Refills: 0 | Status: SHIPPED | OUTPATIENT
Start: 2021-09-23 | End: 2021-09-23

## 2021-09-21 RX ORDER — ESTRADIOL 0.1 MG/D
1 PATCH, EXTENDED RELEASE TRANSDERMAL 2 TIMES WEEKLY
Qty: 8 PATCH | Refills: 1 | Status: SHIPPED | OUTPATIENT
Start: 2021-09-23 | End: 2021-09-23

## 2021-09-21 NOTE — TELEPHONE ENCOUNTER
Pharmacy called and needs clarification for estradiol medication, got two Rx requests but they are not sure which one needs to be filled.

## 2021-09-23 ENCOUNTER — OFFICE VISIT (OUTPATIENT)
Dept: OBSTETRICS AND GYNECOLOGY | Facility: CLINIC | Age: 56
End: 2021-09-23

## 2021-09-23 VITALS
HEIGHT: 64 IN | BODY MASS INDEX: 38.41 KG/M2 | SYSTOLIC BLOOD PRESSURE: 128 MMHG | DIASTOLIC BLOOD PRESSURE: 84 MMHG | WEIGHT: 225 LBS

## 2021-09-23 DIAGNOSIS — N95.1 MENOPAUSAL SYMPTOMS: ICD-10-CM

## 2021-09-23 DIAGNOSIS — Z01.419 PAP TEST, AS PART OF ROUTINE GYNECOLOGICAL EXAMINATION: Primary | ICD-10-CM

## 2021-09-23 DIAGNOSIS — E89.40 POSTSURGICAL MENOPAUSE: ICD-10-CM

## 2021-09-23 DIAGNOSIS — B37.9 YEAST INFECTION: ICD-10-CM

## 2021-09-23 LAB
25(OH)D3+25(OH)D2 SERPL-MCNC: 51.7 NG/ML (ref 30–100)
ALBUMIN SERPL-MCNC: 4.3 G/DL (ref 3.5–5.2)
ALBUMIN/GLOB SERPL: 1.8 G/DL
ALP SERPL-CCNC: 148 U/L (ref 39–117)
ALT SERPL-CCNC: 42 U/L (ref 1–33)
AST SERPL-CCNC: 23 U/L (ref 1–32)
BASOPHILS # BLD AUTO: 0.04 10*3/MM3 (ref 0–0.2)
BASOPHILS NFR BLD AUTO: 0.5 % (ref 0–1.5)
BILIRUB SERPL-MCNC: 0.3 MG/DL (ref 0–1.2)
BUN SERPL-MCNC: 20 MG/DL (ref 6–20)
BUN/CREAT SERPL: 27.8 (ref 7–25)
CALCIUM SERPL-MCNC: 9.6 MG/DL (ref 8.6–10.5)
CHLORIDE SERPL-SCNC: 100 MMOL/L (ref 98–107)
CHOLEST SERPL-MCNC: 116 MG/DL (ref 0–200)
CO2 SERPL-SCNC: 28.3 MMOL/L (ref 22–29)
CREAT SERPL-MCNC: 0.72 MG/DL (ref 0.57–1)
EOSINOPHIL # BLD AUTO: 0.17 10*3/MM3 (ref 0–0.4)
EOSINOPHIL NFR BLD AUTO: 2.2 % (ref 0.3–6.2)
ERYTHROCYTE [DISTWIDTH] IN BLOOD BY AUTOMATED COUNT: 13.4 % (ref 12.3–15.4)
GLOBULIN SER CALC-MCNC: 2.4 GM/DL
GLUCOSE SERPL-MCNC: 115 MG/DL (ref 65–99)
HBA1C MFR BLD: 6.4 % (ref 4.8–5.6)
HCT VFR BLD AUTO: 40.1 % (ref 34–46.6)
HDLC SERPL-MCNC: 37 MG/DL (ref 40–60)
HGB BLD-MCNC: 13.6 G/DL (ref 12–15.9)
IMM GRANULOCYTES # BLD AUTO: 0.05 10*3/MM3 (ref 0–0.05)
IMM GRANULOCYTES NFR BLD AUTO: 0.7 % (ref 0–0.5)
LDLC SERPL CALC-MCNC: 48 MG/DL (ref 0–100)
LYMPHOCYTES # BLD AUTO: 1.95 10*3/MM3 (ref 0.7–3.1)
LYMPHOCYTES NFR BLD AUTO: 25.5 % (ref 19.6–45.3)
MCH RBC QN AUTO: 27.9 PG (ref 26.6–33)
MCHC RBC AUTO-ENTMCNC: 33.9 G/DL (ref 31.5–35.7)
MCV RBC AUTO: 82.2 FL (ref 79–97)
MONOCYTES # BLD AUTO: 0.58 10*3/MM3 (ref 0.1–0.9)
MONOCYTES NFR BLD AUTO: 7.6 % (ref 5–12)
NEUTROPHILS # BLD AUTO: 4.86 10*3/MM3 (ref 1.7–7)
NEUTROPHILS NFR BLD AUTO: 63.5 % (ref 42.7–76)
NRBC BLD AUTO-RTO: 0 /100 WBC (ref 0–0.2)
PLATELET # BLD AUTO: 255 10*3/MM3 (ref 140–450)
POTASSIUM SERPL-SCNC: 4.4 MMOL/L (ref 3.5–5.2)
PROT SERPL-MCNC: 6.7 G/DL (ref 6–8.5)
RBC # BLD AUTO: 4.88 10*6/MM3 (ref 3.77–5.28)
SODIUM SERPL-SCNC: 140 MMOL/L (ref 136–145)
T4 FREE SERPL-MCNC: 1.08 NG/DL (ref 0.93–1.7)
TRIGL SERPL-MCNC: 187 MG/DL (ref 0–150)
TSH SERPL DL<=0.005 MIU/L-ACNC: 1.33 UIU/ML (ref 0.27–4.2)
VLDLC SERPL CALC-MCNC: 31 MG/DL (ref 5–40)
WBC # BLD AUTO: 7.65 10*3/MM3 (ref 3.4–10.8)

## 2021-09-23 PROCEDURE — 99396 PREV VISIT EST AGE 40-64: CPT | Performed by: OBSTETRICS & GYNECOLOGY

## 2021-09-23 RX ORDER — ATORVASTATIN CALCIUM 20 MG/1
TABLET, FILM COATED ORAL
COMMUNITY
Start: 2021-05-31 | End: 2022-08-19 | Stop reason: SDUPTHER

## 2021-09-23 RX ORDER — ESTRADIOL 0.1 MG/D
1 FILM, EXTENDED RELEASE TRANSDERMAL 2 TIMES WEEKLY
Qty: 24 EACH | Refills: 3 | Status: SHIPPED | OUTPATIENT
Start: 2021-09-23 | End: 2022-11-10 | Stop reason: SDUPTHER

## 2021-09-23 RX ORDER — TRAZODONE HYDROCHLORIDE 100 MG/1
TABLET ORAL
COMMUNITY
End: 2023-02-20 | Stop reason: SDUPTHER

## 2021-09-23 RX ORDER — FLUCONAZOLE 150 MG/1
150 TABLET ORAL DAILY
Qty: 2 TABLET | Refills: 3 | Status: SHIPPED | OUTPATIENT
Start: 2021-09-23 | End: 2023-02-20

## 2021-09-23 RX ORDER — FLUTICASONE PROPIONATE 50 MCG
SPRAY, SUSPENSION (ML) NASAL
COMMUNITY
End: 2022-08-19 | Stop reason: SDUPTHER

## 2021-09-23 RX ORDER — EPINEPHRINE 0.3 MG/.3ML
INJECTION SUBCUTANEOUS
COMMUNITY

## 2021-09-23 RX ORDER — CYCLOBENZAPRINE HCL 10 MG
TABLET ORAL
COMMUNITY
Start: 2021-05-28 | End: 2023-02-20 | Stop reason: SDUPTHER

## 2021-09-23 NOTE — PROGRESS NOTES
GYN Annual Exam     CC - Here for annual exam.        HPI  Alia Garza is a 55 y.o. female, , who presents for annual well woman exam.  She is perimenopausal.  Periods are absent s/p TLH/BSO 2019 endometriosis.  Dysmenorrhea:none..  Patient reports problems with: pain with IC, requesting RF on HRT, recent recurrent UTI-seeing urology. Since her last visit the patient underwent surgery for urethral dilation. Partner Status: Marital Status: .  New Partners since last visit: no.      Additional OB/GYN History   Current contraception: contraceptive methods: TLH/BSO  Desires to: do not start contraception  On HRT? Yes. Details: estradiol (vivelle-dot) patch 0.1  Last Pap :   Last Completed Pap Smear     This patient has no relevant Health Maintenance data.        History of abnormal Pap smear: yes - h/o  Family history of uterine, colon, breast, or ovarian cancer: yes - MGM and maternal aunt with h/o breast CA  Performs monthly Self-Breast Exam: yes  Last mammogram: 2020. Done at [facility].    Last Completed Mammogram          Ordered - MAMMOGRAM (Every 2 Years) Ordered on 2020  Mammo Screening Digital Tomosynthesis Bilateral With CAD    2019  Mammo Screening Digital Tomosynthesis Bilateral With CAD    2018  Mammo Screening Digital Tomosynthesis Bilateral With CAD    2017  Mammo Screening Digital Tomosynthesis Bilateral With CAD    2016  Mammo screening bilateral w CAD    Only the first 5 history entries have been loaded, but more history exists.              Last colonoscopy:   Last Completed Colonoscopy          COLORECTAL CANCER SCREENING  Next due on 2018  COLONOSCOPY (Done - negative per patient.)              Last DEXA: None  Exercises Regularly: no  Feelings of Anxiety or Depression: no      Tobacco Usage?: No   OB History        2    Para   2    Term   2            AB        Living           SAB        TAB      "   Ectopic        Molar        Multiple        Live Births                    Health Maintenance   Topic Date Due   • Annual Gynecologic Pelvic and Breast Exam  Never done   • URINE MICROALBUMIN  Never done   • ANNUAL PHYSICAL  Never done   • Pneumococcal Vaccine 0-64 (1 of 2 - PPSV23) Never done   • HEPATITIS C SCREENING  Never done   • DIABETIC FOOT EXAM  Never done   • PAP SMEAR  Never done   • DIABETIC EYE EXAM  Never done   • ZOSTER VACCINE (2 of 2) 04/05/2021   • HEMOGLOBIN A1C  05/23/2021   • INFLUENZA VACCINE  10/01/2021   • MAMMOGRAM  07/29/2022   • COLORECTAL CANCER SCREENING  01/01/2028   • TDAP/TD VACCINES (2 - Td or Tdap) 04/27/2028   • COVID-19 Vaccine  Completed       The additional following portions of the patient's history were reviewed and updated as appropriate: allergies, current medications, past family history, past medical history, past social history and past surgical history.    Review of Systems   Constitutional: Negative.    HENT: Negative.    Eyes: Negative.    Respiratory: Negative.    Cardiovascular: Negative.    Gastrointestinal: Negative.    Endocrine: Negative.    Genitourinary: Positive for dyspareunia.   Musculoskeletal: Negative.    Skin: Negative.    Allergic/Immunologic: Negative.    Neurological: Negative.    Hematological: Negative.    Psychiatric/Behavioral: Negative.        I have reviewed and agree with the HPI, ROS, and historical information as entered above. Faye Mac MD    Objective   /84   Ht 162.6 cm (64\")   Wt 102 kg (225 lb)   LMP 07/18/2019   Breastfeeding No   BMI 38.62 kg/m²     Physical Exam  Vitals and nursing note reviewed. Exam conducted with a chaperone present.   Constitutional:       Appearance: She is well-developed.   HENT:      Head: Normocephalic and atraumatic.   Neck:      Thyroid: No thyroid mass or thyromegaly.   Cardiovascular:      Rate and Rhythm: Normal rate and regular rhythm.      Heart sounds: No murmur heard.     Pulmonary: "      Effort: Pulmonary effort is normal. No retractions.      Breath sounds: Normal breath sounds. No wheezing, rhonchi or rales.   Chest:      Chest wall: No mass or tenderness.      Breasts:         Right: Normal. No mass, nipple discharge, skin change or tenderness.         Left: Normal. No mass, nipple discharge, skin change or tenderness.   Abdominal:      General: Bowel sounds are normal.      Palpations: Abdomen is soft. Abdomen is not rigid. There is no mass.      Tenderness: There is no abdominal tenderness. There is no guarding.      Hernia: No hernia is present. There is no hernia in the left inguinal area.   Genitourinary:     Labia:         Right: No rash, tenderness or lesion.         Left: No rash, tenderness or lesion.       Vagina: Normal. No vaginal discharge or lesions.      Cervix: No cervical motion tenderness, discharge, lesion or cervical bleeding.      Uterus: Normal. Not enlarged, not fixed and not tender.       Adnexa:         Right: No mass or tenderness.          Left: No mass or tenderness.        Rectum: No external hemorrhoid.   Musculoskeletal:      Cervical back: Normal range of motion. No muscular tenderness.   Neurological:      Mental Status: She is alert and oriented to person, place, and time.   Psychiatric:         Behavior: Behavior normal.            Assessment and Plan    Problem List Items Addressed This Visit     None      Visit Diagnoses     Pap test, as part of routine gynecological examination    -  Primary    Relevant Orders    Pap IG, Rfx HPV ASCU    Mammo Screening Digital Tomosynthesis Bilateral With CAD    Postsurgical menopause        Menopausal symptoms        Relevant Medications    estradiol (VIVELLE-DOT) 0.1 MG/24HR patch    Yeast infection        Relevant Medications    fluconazole (Diflucan) 150 MG tablet          1. GYN annual well woman exam.   2. Reviewed monthly self breast exams.  Instructed to call with lumps, pain, or breast discharge.  Yearly  mammograms ordered.  3. Ordered mammogram today.  4. Reviewed exercise as a preventative health measures.   5. Colonoscopy recommended.  6. Symptoms of menopausal transition reviewed with patient.   7. RTC in 1 year or PRN with problems.  8. Other: VAG SX BUT EXAM NL AND WE WILL RX FOR YEAST AND LUBRICATION  9. No follow-ups on file.     Faye Mac MD  09/23/2021

## 2021-09-30 ENCOUNTER — HOSPITAL ENCOUNTER (OUTPATIENT)
Dept: MAMMOGRAPHY | Facility: HOSPITAL | Age: 56
Discharge: HOME | End: 2021-09-30
Admitting: OBSTETRICS & GYNECOLOGY

## 2021-09-30 DIAGNOSIS — Z01.419 PAP TEST, AS PART OF ROUTINE GYNECOLOGICAL EXAMINATION: ICD-10-CM

## 2021-09-30 PROCEDURE — 77063 BREAST TOMOSYNTHESIS BI: CPT

## 2021-09-30 PROCEDURE — 77063 BREAST TOMOSYNTHESIS BI: CPT | Performed by: RADIOLOGY

## 2021-09-30 PROCEDURE — 77067 SCR MAMMO BI INCL CAD: CPT | Performed by: RADIOLOGY

## 2021-09-30 PROCEDURE — 77067 SCR MAMMO BI INCL CAD: CPT

## 2022-08-09 RX ORDER — POTASSIUM CHLORIDE 20 MEQ/1
TABLET, EXTENDED RELEASE ORAL
Qty: 90 TABLET | Refills: 0 | Status: SHIPPED | OUTPATIENT
Start: 2022-08-09 | End: 2022-08-19 | Stop reason: SDUPTHER

## 2022-08-09 RX ORDER — TRIAMTERENE AND HYDROCHLOROTHIAZIDE 37.5; 25 MG/1; MG/1
TABLET ORAL
Qty: 90 TABLET | Refills: 0 | Status: SHIPPED | OUTPATIENT
Start: 2022-08-09 | End: 2022-08-19 | Stop reason: SDUPTHER

## 2022-08-19 ENCOUNTER — OFFICE VISIT (OUTPATIENT)
Dept: FAMILY MEDICINE CLINIC | Facility: CLINIC | Age: 57
End: 2022-08-19

## 2022-08-19 VITALS
RESPIRATION RATE: 16 BRPM | TEMPERATURE: 97.7 F | HEIGHT: 64 IN | HEART RATE: 72 BPM | BODY MASS INDEX: 36.67 KG/M2 | WEIGHT: 214.8 LBS | SYSTOLIC BLOOD PRESSURE: 150 MMHG | DIASTOLIC BLOOD PRESSURE: 90 MMHG | OXYGEN SATURATION: 98 %

## 2022-08-19 DIAGNOSIS — E78.2 MIXED HYPERLIPIDEMIA: ICD-10-CM

## 2022-08-19 DIAGNOSIS — F51.04 PSYCHOPHYSIOLOGICAL INSOMNIA: ICD-10-CM

## 2022-08-19 DIAGNOSIS — J30.1 NON-SEASONAL ALLERGIC RHINITIS DUE TO POLLEN: ICD-10-CM

## 2022-08-19 DIAGNOSIS — G25.81 RESTLESS LEGS: ICD-10-CM

## 2022-08-19 DIAGNOSIS — E87.6 HYPOKALEMIA: ICD-10-CM

## 2022-08-19 DIAGNOSIS — G56.02 CARPAL TUNNEL SYNDROME OF LEFT WRIST: ICD-10-CM

## 2022-08-19 DIAGNOSIS — Z79.899 ENCOUNTER FOR LONG-TERM (CURRENT) USE OF OTHER MEDICATIONS: ICD-10-CM

## 2022-08-19 DIAGNOSIS — K21.9 GASTROESOPHAGEAL REFLUX DISEASE WITHOUT ESOPHAGITIS: ICD-10-CM

## 2022-08-19 DIAGNOSIS — I10 BENIGN ESSENTIAL HYPERTENSION: ICD-10-CM

## 2022-08-19 DIAGNOSIS — G47.33 OBSTRUCTIVE SLEEP APNEA SYNDROME: ICD-10-CM

## 2022-08-19 DIAGNOSIS — R91.1 LUNG NODULE: ICD-10-CM

## 2022-08-19 DIAGNOSIS — D51.0 PERNICIOUS ANEMIA: ICD-10-CM

## 2022-08-19 DIAGNOSIS — D50.9 IRON DEFICIENCY ANEMIA, UNSPECIFIED IRON DEFICIENCY ANEMIA TYPE: ICD-10-CM

## 2022-08-19 DIAGNOSIS — R53.83 FATIGUE, UNSPECIFIED TYPE: ICD-10-CM

## 2022-08-19 DIAGNOSIS — E11.9 TYPE 2 DIABETES MELLITUS WITHOUT COMPLICATION, WITHOUT LONG-TERM CURRENT USE OF INSULIN: Primary | ICD-10-CM

## 2022-08-19 DIAGNOSIS — G43.009 MIGRAINE WITHOUT AURA AND WITHOUT STATUS MIGRAINOSUS, NOT INTRACTABLE: ICD-10-CM

## 2022-08-19 PROCEDURE — 36415 COLL VENOUS BLD VENIPUNCTURE: CPT | Performed by: FAMILY MEDICINE

## 2022-08-19 PROCEDURE — 99214 OFFICE O/P EST MOD 30 MIN: CPT | Performed by: FAMILY MEDICINE

## 2022-08-19 RX ORDER — TRIAMTERENE AND HYDROCHLOROTHIAZIDE 37.5; 25 MG/1; MG/1
1 TABLET ORAL DAILY
Qty: 90 TABLET | Refills: 1 | Status: SHIPPED | OUTPATIENT
Start: 2022-08-19 | End: 2023-02-20 | Stop reason: SDUPTHER

## 2022-08-19 RX ORDER — SUMATRIPTAN 20 MG/1
1 SPRAY NASAL
Qty: 1 EACH | Refills: 11 | Status: SHIPPED | OUTPATIENT
Start: 2022-08-19

## 2022-08-19 RX ORDER — ROPINIROLE 2 MG/1
2 TABLET, FILM COATED ORAL 2 TIMES DAILY
Qty: 180 TABLET | Refills: 3 | Status: SHIPPED | OUTPATIENT
Start: 2022-08-19

## 2022-08-19 RX ORDER — CETIRIZINE HYDROCHLORIDE 10 MG/1
10 TABLET ORAL DAILY
COMMUNITY
End: 2022-08-19 | Stop reason: SDUPTHER

## 2022-08-19 RX ORDER — POTASSIUM CHLORIDE 20 MEQ/1
20 TABLET, EXTENDED RELEASE ORAL DAILY
Qty: 90 TABLET | Refills: 1 | Status: SHIPPED | OUTPATIENT
Start: 2022-08-19 | End: 2023-02-20 | Stop reason: SDUPTHER

## 2022-08-19 RX ORDER — OLOPATADINE HYDROCHLORIDE 2 MG/ML
1 SOLUTION/ DROPS OPHTHALMIC DAILY
Qty: 7.5 ML | Refills: 3 | Status: SHIPPED | OUTPATIENT
Start: 2022-08-19

## 2022-08-19 RX ORDER — FLUTICASONE PROPIONATE 50 MCG
2 SPRAY, SUSPENSION (ML) NASAL DAILY
Qty: 48 G | Refills: 3 | Status: SHIPPED | OUTPATIENT
Start: 2022-08-19

## 2022-08-19 RX ORDER — CETIRIZINE HYDROCHLORIDE 10 MG/1
10 TABLET ORAL DAILY
Qty: 90 TABLET | Refills: 3 | Status: SHIPPED | OUTPATIENT
Start: 2022-08-19

## 2022-08-19 RX ORDER — SUMATRIPTAN 20 MG/1
1 SPRAY NASAL
COMMUNITY
End: 2022-08-19 | Stop reason: SDUPTHER

## 2022-08-19 RX ORDER — ATORVASTATIN CALCIUM 20 MG/1
20 TABLET, FILM COATED ORAL DAILY
Qty: 90 TABLET | Refills: 3 | Status: SHIPPED | OUTPATIENT
Start: 2022-08-19

## 2022-08-19 NOTE — PROGRESS NOTES
"Chief Complaint  Med Refill    Subjective      Alia Garza presents to [facility]  History of Present Illness  Pt has been stable overall. Lung nodule on CT in July was stable and radiologist said one year followup could be CONSIDERED. Pt quit smoking over 15yrs ago, has no pulmonary or systemic sx, but still wishes to do another yearly CT as precaution. DDX discussed, if sx develop, juan manuel fever, NS, wt loss, adenopathy, cough/dyspnea, she will let me know. Other problems are well controlled, other than worsening CTS in left hand. Has not been using night splint, so will start doing so and take her naaproxen bid x 1mo and if not much better, will call for Orrthopedic referral, as she has had surgery on right hand for same issue and understands risks.     Objective   Vital Signs:   Vitals:    08/19/22 0818   BP: 150/90   Pulse: 72   Resp: 16   Temp: 97.7 °F (36.5 °C)   SpO2: 98%   Weight: 97.4 kg (214 lb 12.8 oz)   Height: 162.6 cm (64\")      /90   Pulse 72   Temp 97.7 °F (36.5 °C)   Resp 16   Ht 162.6 cm (64\")   Wt 97.4 kg (214 lb 12.8 oz)   SpO2 98%   BMI 36.87 kg/m²     Body mass index is 36.87 kg/m².    Review of Systems   Constitutional: Positive for fatigue. Negative for chills, fever and unexpected weight loss.   HENT: Negative for ear discharge, ear pain, mouth sores, nosebleeds, rhinorrhea, sinus pressure, sore throat, swollen glands, trouble swallowing and voice change.    Eyes: Negative for blurred vision, double vision, pain, redness and visual disturbance.   Respiratory: Negative for cough, chest tightness, shortness of breath and wheezing.    Cardiovascular: Negative for chest pain, palpitations and leg swelling.        PND, orthopnea   Gastrointestinal: Negative for abdominal distention, abdominal pain, anal bleeding, blood in stool, constipation, diarrhea, nausea, vomiting and GERD.        Dysphagia, odynophagia   Endocrine: Negative for polydipsia, " polyphagia and polyuria.   Genitourinary: Negative for dysuria, frequency, hematuria, urgency and urinary incontinence.   Musculoskeletal: Negative for arthralgias (unusual/atypica), back pain, gait problem, joint swelling, myalgias and neck pain.   Skin: Negative for rash, skin lesions (worrisome/suspicious) and bruise.   Allergic/Immunologic: Positive for environmental allergies. Negative for food allergies.   Neurological: Positive for numbness. Negative for dizziness, tremors, seizures, syncope, weakness, light-headedness, headache and memory problem.   Hematological: Negative for adenopathy. Does not bruise/bleed easily.   Psychiatric/Behavioral: Negative for suicidal ideas and depressed mood. The patient is not nervous/anxious.        Past History:  Medical History: has a past medical history of Abnormal Pap smear of cervix, Anemia, Angioedema (2008), AR (allergic rhinitis), Benign essential HTN, Carpal tunnel syndrome, Chronic fatigue syndrome, DM type 2 with diabetic dyslipidemia (HCC), Dyspareunia, female, Endometriosis, GERD (gastroesophageal reflux disease), Headache, High risk medication use, Hyperglycemia, Hyperlipidemia, Hypertension, Iron deficiency anemia, Low iron, Migraine, Obesity, Ovarian cyst (1995), Pernicious anemia, Polycystic disease, ovaries, Prediabetes, Pregnancy (2001), Recurrent UTI, RLS (restless legs syndrome), Sleep apnea, and Type 2 diabetes mellitus without complication (HCC).   Surgical History: has a past surgical history that includes Tonsillectomy; Colonoscopy (2018); Bladder surgery; Exploratory laparotomy; Carpal tunnel release (Right); LASIK (2015); total laparoscopic hysterectomy salpingo oophorectomy (N/A, 09/11/2019); Oophorectomy; Urethral dilation; Uvulectomy; Total abdominal hysterectomy w/ bilateral salpingoophorectomy (09/2019); and Laparoscopy.   Family History: family history includes ADD / ADHD in her son; Alcohol abuse in her maternal grandfather; Breast cancer  (age of onset: 39) in her maternal aunt; Breast cancer (age of onset: 92) in her maternal grandmother; Diabetes in some other family members; Hyperlipidemia in her father; Hypertension in her father; Migraines in her mother; Prostate cancer in her paternal grandfather; Thyroid disease in her mother.   Social History: reports that she quit smoking about 32 years ago. Her smoking use included cigarettes. She has a 1.50 pack-year smoking history. She has never used smokeless tobacco. She reports that she does not drink alcohol and does not use drugs.      Current Outpatient Medications:   •  Aspirin Buf,CaCarb-MgCarb-MgO, 81 MG tablet, Take 1 tablet by mouth., Disp: , Rfl:   •  atorvastatin (LIPITOR) 20 MG tablet, atorvastatin 20 mg tablet  TAKE 1 TABLET BY MOUTH ONCE DAILY, Disp: , Rfl:   •  cetirizine (zyrTEC) 10 MG tablet, Take 10 mg by mouth Daily., Disp: , Rfl:   •  cyclobenzaprine (FLEXERIL) 10 MG tablet, cyclobenzaprine 10 mg tablet  TAKE 1 TABLET BY MOUTH TWICE DAILY AS NEEDED FOR BACK SPASMS, Disp: , Rfl:   •  cycloSPORINE (RESTASIS) 0.05 % ophthalmic emulsion, Administer 1 drop to both eyes 2 (Two) Times a Day., Disp: , Rfl:   •  EPINEPHrine (EPIPEN) 0.3 MG/0.3ML solution auto-injector injection, epinephrine 0.3 mg/0.3 mL injection, auto-injector, Disp: , Rfl:   •  estradiol (VIVELLE-DOT) 0.1 MG/24HR patch, Place 1 patch on the skin as directed by provider 2 (Two) Times a Week., Disp: 24 each, Rfl: 3  •  ferrous sulfate 140 (45 Fe) MG tablet controlled-release tablet, Take  by mouth Daily With Breakfast., Disp: , Rfl:   •  fluconazole (Diflucan) 150 MG tablet, Take 1 tablet by mouth Daily., Disp: 2 tablet, Rfl: 3  •  fluticasone (FLONASE) 50 MCG/ACT nasal spray, fluticasone propionate 50 mcg/actuation nasal spray,suspension, Disp: , Rfl:   •  metFORMIN (GLUCOPHAGE) 500 MG tablet, TAKE 1 TABLET BY MOUTH TWICE DAILY WITH MORNING MEAL AND WITH EVENING MEAL, Disp: 30 tablet, Rfl: 0  •  naproxen (NAPROSYN) 500 MG  tablet, Take 500 mg by mouth 2 (Two) Times a Day As Needed for Mild Pain  (neck inflammation)., Disp: , Rfl:   •  NON FORMULARY, Inject  under the skin into the appropriate area as directed 2 (Two) Times a Week. TUESDAYS AND THURSDAYS; Allergy shots, Disp: , Rfl:   •  olopatadine (PATADAY) 0.2 % solution ophthalmic solution, Administer 1 drop to both eyes Daily., Disp: , Rfl:   •  potassium chloride (K-DUR,KLOR-CON) 20 MEQ CR tablet, Take 1 tablet by mouth once daily with food, Disp: 90 tablet, Rfl: 0  •  rOPINIRole (REQUIP) 2 MG tablet, Take 2 mg by mouth 2 (Two) Times a Day., Disp: , Rfl:   •  SUMAtriptan (Imitrex) 20 MG/ACT nasal spray, 1 spray into the nostril(s) as directed by provider Every 2 (Two) Hours As Needed for Migraine., Disp: , Rfl:   •  traZODone (DESYREL) 100 MG tablet, trazodone 100 mg tablet  TAKE 1 2 TO 1 (ONE HALF TO ONE) TABLET BY MOUTH EVERY DAY AT BEDTIME FOR SLEEP, Disp: , Rfl:   •  triamterene-hydrochlorothiazide (MAXZIDE-25) 37.5-25 MG per tablet, Take 1 tablet by mouth once daily for blood pressure, Disp: 90 tablet, Rfl: 0    Allergies: Ace inhibitors and Lisinopril    Physical Exam  Constitutional:       General: She is not in acute distress.     Appearance: She is obese. She is not toxic-appearing.   HENT:      Head: Normocephalic and atraumatic.      Right Ear: Ear canal and external ear normal.      Left Ear: Ear canal and external ear normal.      Nose: Nose normal.      Mouth/Throat:      Mouth: Mucous membranes are moist.      Pharynx: Oropharynx is clear.   Eyes:      General: No scleral icterus.     Extraocular Movements: Extraocular movements intact.      Conjunctiva/sclera: Conjunctivae normal.      Pupils: Pupils are equal, round, and reactive to light.   Neck:      Vascular: No carotid bruit.   Cardiovascular:      Rate and Rhythm: Normal rate and regular rhythm.      Pulses: Normal pulses.      Heart sounds: Normal heart sounds.   Pulmonary:      Effort: Pulmonary effort is  normal.      Breath sounds: Normal breath sounds.   Chest:      Chest wall: No tenderness.   Abdominal:      General: Bowel sounds are normal. There is no distension.      Palpations: Abdomen is soft.      Tenderness: There is no abdominal tenderness. There is no guarding or rebound.   Musculoskeletal:         General: No swelling or deformity. Normal range of motion.      Cervical back: Normal range of motion. No rigidity.      Right lower leg: No edema.      Left lower leg: No edema.   Lymphadenopathy:      Cervical: No cervical adenopathy.   Skin:     General: Skin is warm and dry.      Capillary Refill: Capillary refill takes less than 2 seconds.      Coloration: Skin is not pale.      Findings: No erythema or rash.   Neurological:      General: No focal deficit present.      Mental Status: She is alert and oriented to person, place, and time.      Cranial Nerves: No cranial nerve deficit.      Motor: No weakness.      Coordination: Coordination normal.      Gait: Gait normal.   Psychiatric:         Mood and Affect: Mood normal.         Behavior: Behavior normal.         Thought Content: Thought content normal.         Judgment: Judgment normal.          Result Review :                  Assessment and Plan   Diagnoses and all orders for this visit:    1. Type 2 diabetes mellitus without complication, without long-term current use of insulin (HCC) (Primary)  -     Hemoglobin A1c; Future  Has improved with metformin, diet and exercise. Will refill meds and check labs. Other issues stable exc cts in left hand, see above. Will schedu[place] CT of chest in July of 2023. Pt mentioned some bilateral lateral chest wall soreness from moving items during yard sale , but has no breast tissue tenderness, mass, adenopathy, or skin changes, and no adenopathy, will have yearly MMG next month and sees GYN there--if other iwsues develop she will let us know  2. Benign essential hypertension    3. Non-seasonal allergic rhinitis due  to pollen    4. Iron deficiency anemia, unspecified iron deficiency anemia type  -     CBC Auto Differential; Future  -     Ferritin; Future  -     Iron; Future    5. Migraine without aura and without status migrainosus, not intractable    6. Restless legs    7. Pernicious anemia  -     Vitamin B12; Future    8. Obstructive sleep apnea syndrome    9. Fatigue, unspecified type  -     TSH; Future    10. Gastroesophageal reflux disease without esophagitis    11. Mixed hyperlipidemia  -     Lipid Panel; Future    12. Encounter for long-term (current) use of other medications  -     Comprehensive Metabolic Panel; Future  -     Magnesium; Future    13. Psychophysiological insomnia    14. Hypokalemia                 Follow Up   Return in about 6 months (around 2/19/2023) for Annual physical.  Patient was given instructions and counseling regarding her condition or for health maintenance advice. Please see specific information pulled into the AVS if appropriate.     Jhon Edmond MD

## 2022-08-20 LAB
ALBUMIN SERPL-MCNC: 4 G/DL (ref 3.8–4.9)
ALBUMIN/GLOB SERPL: 1.5 {RATIO} (ref 1.2–2.2)
ALP SERPL-CCNC: 134 IU/L (ref 44–121)
ALT SERPL-CCNC: 21 IU/L (ref 0–32)
AST SERPL-CCNC: 17 IU/L (ref 0–40)
BASOPHILS # BLD AUTO: 0 X10E3/UL (ref 0–0.2)
BASOPHILS NFR BLD AUTO: 0 %
BILIRUB SERPL-MCNC: 0.3 MG/DL (ref 0–1.2)
BUN SERPL-MCNC: 18 MG/DL (ref 6–24)
BUN/CREAT SERPL: 25 (ref 9–23)
CALCIUM SERPL-MCNC: 9.3 MG/DL (ref 8.7–10.2)
CHLORIDE SERPL-SCNC: 97 MMOL/L (ref 96–106)
CHOLEST SERPL-MCNC: 124 MG/DL (ref 100–199)
CO2 SERPL-SCNC: 23 MMOL/L (ref 20–29)
CREAT SERPL-MCNC: 0.73 MG/DL (ref 0.57–1)
EGFRCR-CYS SERPLBLD CKD-EPI 2021: 98 ML/MIN/1.73
EOSINOPHIL # BLD AUTO: 0.1 X10E3/UL (ref 0–0.4)
EOSINOPHIL NFR BLD AUTO: 2 %
ERYTHROCYTE [DISTWIDTH] IN BLOOD BY AUTOMATED COUNT: 13 % (ref 11.7–15.4)
FERRITIN SERPL-MCNC: 54 NG/ML (ref 15–150)
GLOBULIN SER CALC-MCNC: 2.7 G/DL (ref 1.5–4.5)
GLUCOSE SERPL-MCNC: 122 MG/DL (ref 65–99)
HBA1C MFR BLD: 6.7 % (ref 4.8–5.6)
HCT VFR BLD AUTO: 41.5 % (ref 34–46.6)
HDLC SERPL-MCNC: 38 MG/DL
HGB BLD-MCNC: 13.6 G/DL (ref 11.1–15.9)
IMM GRANULOCYTES # BLD AUTO: 0 X10E3/UL (ref 0–0.1)
IMM GRANULOCYTES NFR BLD AUTO: 0 %
IRON SERPL-MCNC: 54 UG/DL (ref 27–159)
LDLC SERPL CALC-MCNC: 51 MG/DL (ref 0–99)
LYMPHOCYTES # BLD AUTO: 1.9 X10E3/UL (ref 0.7–3.1)
LYMPHOCYTES NFR BLD AUTO: 25 %
MAGNESIUM SERPL-MCNC: 1.8 MG/DL (ref 1.6–2.3)
MCH RBC QN AUTO: 27.5 PG (ref 26.6–33)
MCHC RBC AUTO-ENTMCNC: 32.8 G/DL (ref 31.5–35.7)
MCV RBC AUTO: 84 FL (ref 79–97)
MONOCYTES # BLD AUTO: 0.7 X10E3/UL (ref 0.1–0.9)
MONOCYTES NFR BLD AUTO: 8 %
NEUTROPHILS # BLD AUTO: 5.1 X10E3/UL (ref 1.4–7)
NEUTROPHILS NFR BLD AUTO: 65 %
PLATELET # BLD AUTO: 249 X10E3/UL (ref 150–450)
POTASSIUM SERPL-SCNC: 4.2 MMOL/L (ref 3.5–5.2)
PROT SERPL-MCNC: 6.7 G/DL (ref 6–8.5)
RBC # BLD AUTO: 4.94 X10E6/UL (ref 3.77–5.28)
SODIUM SERPL-SCNC: 140 MMOL/L (ref 134–144)
TRIGL SERPL-MCNC: 217 MG/DL (ref 0–149)
TSH SERPL DL<=0.005 MIU/L-ACNC: 2.92 UIU/ML (ref 0.45–4.5)
VIT B12 SERPL-MCNC: 365 PG/ML (ref 232–1245)
VLDLC SERPL CALC-MCNC: 35 MG/DL (ref 5–40)
WBC # BLD AUTO: 7.9 X10E3/UL (ref 3.4–10.8)

## 2022-09-26 ENCOUNTER — TELEPHONE (OUTPATIENT)
Dept: OBSTETRICS AND GYNECOLOGY | Facility: CLINIC | Age: 57
End: 2022-09-26

## 2022-09-26 NOTE — TELEPHONE ENCOUNTER
Dr Mac pt  Last visit: 21  Next appt: 10/13/22      S/w pt and she states that pharmacy told her that her Rx for her Estradiol patches was for Brand name only. I called the pharmacy and spoke with the Pharmacist and he states that the pt's rx has  and that she needed a new one. I gave verbal ok for one month refill with no additional refills. Pt has been scheduled for her annual exam on 10/13/22 @ 3:30 and notified of medication refill. Pt v/u

## 2022-09-26 NOTE — TELEPHONE ENCOUNTER
Pt states we called in name brand estrodial patches and we need to call in the generic patches. She states the pharmacy said we needed to call in a new prescription for the generic

## 2022-09-29 ENCOUNTER — TRANSCRIBE ORDERS (OUTPATIENT)
Dept: ADMINISTRATIVE | Facility: HOSPITAL | Age: 57
End: 2022-09-29

## 2022-09-29 DIAGNOSIS — Z12.31 VISIT FOR SCREENING MAMMOGRAM: Primary | ICD-10-CM

## 2022-10-31 ENCOUNTER — HOSPITAL ENCOUNTER (OUTPATIENT)
Dept: MAMMOGRAPHY | Facility: HOSPITAL | Age: 57
Discharge: HOME | End: 2022-10-31
Admitting: OBSTETRICS & GYNECOLOGY

## 2022-10-31 DIAGNOSIS — Z12.31 VISIT FOR SCREENING MAMMOGRAM: ICD-10-CM

## 2022-10-31 PROCEDURE — 77063 BREAST TOMOSYNTHESIS BI: CPT | Performed by: RADIOLOGY

## 2022-10-31 PROCEDURE — 77063 BREAST TOMOSYNTHESIS BI: CPT

## 2022-10-31 PROCEDURE — 77067 SCR MAMMO BI INCL CAD: CPT | Performed by: RADIOLOGY

## 2022-10-31 PROCEDURE — 77067 SCR MAMMO BI INCL CAD: CPT

## 2022-11-10 ENCOUNTER — OFFICE VISIT (OUTPATIENT)
Dept: OBSTETRICS AND GYNECOLOGY | Facility: CLINIC | Age: 57
End: 2022-11-10

## 2022-11-10 VITALS — SYSTOLIC BLOOD PRESSURE: 126 MMHG | BODY MASS INDEX: 37.39 KG/M2 | DIASTOLIC BLOOD PRESSURE: 82 MMHG | WEIGHT: 217.8 LBS

## 2022-11-10 DIAGNOSIS — N95.1 MENOPAUSAL SYMPTOMS: ICD-10-CM

## 2022-11-10 DIAGNOSIS — Z01.419 PAP TEST, AS PART OF ROUTINE GYNECOLOGICAL EXAMINATION: Primary | ICD-10-CM

## 2022-11-10 PROCEDURE — 99396 PREV VISIT EST AGE 40-64: CPT | Performed by: OBSTETRICS & GYNECOLOGY

## 2022-11-10 RX ORDER — ESTRADIOL 0.1 MG/D
1 FILM, EXTENDED RELEASE TRANSDERMAL 2 TIMES WEEKLY
Qty: 24 EACH | Refills: 3 | Status: SHIPPED | OUTPATIENT
Start: 2022-11-10

## 2022-11-10 NOTE — PROGRESS NOTES
Gynecologic Annual Exam Note        GYN Annual Exam     CC - Here for annual exam.        HPI  Alia Garza is a 56 y.o. female, , who presents for annual well woman exam as a established patient.  She is s/p TLH/BSO in 2019 for fibroids and endometriosis .. Denies vaginal bleeding.  Patient reports problems with: none. There were no changes to her medical or surgical history since her last visit.. Partner Status: Marital Status: .  She is is sexually active. She has not had new partners.. STD testing recommendations have been explained to the patient and she does not desire STD testing.    Additional OB/GYN History   On HRT? Yes. Details: patch      Last Pap : 21. Results: negative. HPV: not done  Last Completed Pap Smear     This patient has no relevant Health Maintenance data.        History of abnormal Pap smear: yes - repeat neg  Family history of uterine, colon, breast, or ovarian cancer: yes - grandmother and aunt with breast ca  Performs monthly Self-Breast Exam: yes  Last mammogram: 10/31/22. Done at .    Last Completed Mammogram          MAMMOGRAM (Every 2 Years) Next due on 10/31/2024    10/31/2022  Mammo Screening Digital Tomosynthesis Bilateral With CAD    2021  Mammo Screening Digital Tomosynthesis Bilateral With CAD    2020  Mammo Screening Digital Tomosynthesis Bilateral With CAD    2019  Mammo Screening Digital Tomosynthesis Bilateral With CAD    2018  [facility]    Only the first 5 history entries have been loaded, but more history exists.              Last colonoscopy: has had a colonoscopy 5 year(s) ago.    Last Completed Colonoscopy          COLORECTAL CANCER SCREENING (COLONOSCOPY - Every 10 Years) Next due on 3/13/2028    2018  Colonoscopy    2018  COLONOSCOPY (Done - negative per patient.)                  Last bone density scan (DEXA): None  Exercises Regularly: no  Feelings of Anxiety or Depression: no      Tobacco Usage?:  No       Current Outpatient Medications:   •  Aspirin Buf,CaCarb-MgCarb-MgO, 81 MG tablet, Take 1 tablet by mouth., Disp: , Rfl:   •  atorvastatin (LIPITOR) 20 MG tablet, Take 1 tablet by mouth Daily., Disp: 90 tablet, Rfl: 3  •  cetirizine (zyrTEC) 10 MG tablet, Take 1 tablet by mouth Daily., Disp: 90 tablet, Rfl: 3  •  cyclobenzaprine (FLEXERIL) 10 MG tablet, cyclobenzaprine 10 mg tablet  TAKE 1 TABLET BY MOUTH TWICE DAILY AS NEEDED FOR BACK SPASMS, Disp: , Rfl:   •  cycloSPORINE (RESTASIS) 0.05 % ophthalmic emulsion, Administer 1 drop to both eyes 2 (Two) Times a Day., Disp: , Rfl:   •  EPINEPHrine (EPIPEN) 0.3 MG/0.3ML solution auto-injector injection, epinephrine 0.3 mg/0.3 mL injection, auto-injector, Disp: , Rfl:   •  estradiol (VIVELLE-DOT) 0.1 MG/24HR patch, Place 1 patch on the skin as directed by provider 2 (Two) Times a Week. Please use generic, Disp: 24 each, Rfl: 3  •  ferrous sulfate 140 (45 Fe) MG tablet controlled-release tablet, Take  by mouth Daily With Breakfast., Disp: , Rfl:   •  fluticasone (FLONASE) 50 MCG/ACT nasal spray, 2 sprays into the nostril(s) as directed by provider Daily., Disp: 48 g, Rfl: 3  •  metFORMIN (GLUCOPHAGE) 500 MG tablet, Take 1 tablet by mouth 2 (Two) Times a Day With Meals., Disp: 180 tablet, Rfl: 1  •  naproxen (NAPROSYN) 500 MG tablet, Take 500 mg by mouth 2 (Two) Times a Day As Needed for Mild Pain  (neck inflammation)., Disp: , Rfl:   •  NON FORMULARY, Inject  under the skin into the appropriate area as directed 2 (Two) Times a Week. TUESDAYS AND THURSDAYS; Allergy shots, Disp: , Rfl:   •  olopatadine (PATADAY) 0.2 % solution ophthalmic solution, Administer 1 drop to both eyes Daily., Disp: 7.5 mL, Rfl: 3  •  potassium chloride (K-DUR,KLOR-CON) 20 MEQ CR tablet, Take 1 tablet by mouth Daily. with food., Disp: 90 tablet, Rfl: 1  •  rOPINIRole (REQUIP) 2 MG tablet, Take 1 tablet by mouth 2 (Two) Times a Day., Disp: 180 tablet, Rfl: 3  •  SUMAtriptan (Imitrex) 20  MG/ACT nasal spray, 1 spray into the nostril(s) as directed by provider Every 2 (Two) Hours As Needed for Migraine., Disp: 1 each, Rfl: 11  •  traZODone (DESYREL) 100 MG tablet, trazodone 100 mg tablet  TAKE 1 2 TO 1 (ONE HALF TO ONE) TABLET BY MOUTH EVERY DAY AT BEDTIME FOR SLEEP, Disp: , Rfl:   •  triamterene-hydrochlorothiazide (MAXZIDE-25) 37.5-25 MG per tablet, Take 1 tablet by mouth Daily. for blood pressure, Disp: 90 tablet, Rfl: 1  •  fluconazole (Diflucan) 150 MG tablet, Take 1 tablet by mouth Daily., Disp: 2 tablet, Rfl: 3    Patient is requesting refills of estradiol .    OB History        2    Para   2    Term   2            AB        Living           SAB        IAB        Ectopic        Molar        Multiple        Live Births                    Past Medical History:   Diagnosis Date   • Abnormal Pap smear of cervix    • Anemia     PRENICIOUS   • Angioedema     FROM LISINOPRIL   • AR (allergic rhinitis)    • Benign essential HTN    • Carpal tunnel syndrome    • Chronic fatigue syndrome    • DM type 2 with diabetic dyslipidemia (HCC)    • Dyspareunia, female    • Endometriosis    • GERD (gastroesophageal reflux disease)    • Headache    • High risk medication use    • Hyperglycemia    • Hyperlipidemia     ASSOCIATED WITH TYPE 2 DIABETES MELLITUS   • Hypertension    • Iron deficiency anemia    • Low iron    • Migraine    • Obesity    • Ovarian cyst    • Pernicious anemia    • Polycystic disease, ovaries    • Prediabetes     diet controlled   • Pregnancy     VAGINAL FORCEPS   • Recurrent UTI    • RLS (restless legs syndrome)    • Sleep apnea    • Type 2 diabetes mellitus without complication (HCC)         Past Surgical History:   Procedure Laterality Date   • BLADDER SURGERY      bladder rectum tacted    • CARPAL TUNNEL RELEASE Right    • COLONOSCOPY     • DIAGNOSTIC LAPAROSCOPY     • EXPLORATORY LAPAROTOMY      x2 endo   • LASIK     • OOPHORECTOMY      TLH/BSO 2019 for  endo   • TONSILLECTOMY     • TOTAL ABDOMINAL HYSTERECTOMY WITH SALPINGO OOPHORECTOMY  09/2019   • TOTAL LAPAROSCOPIC HYSTERECTOMY SALPINGO OOPHORECTOMY N/A 09/11/2019    Procedure: TOTAL LAPAROSCOPIC HYSTERECTOMY BILATERAL SALPINGECTOMY, OOPHORECTOMY, CYSTOSCOPY;  Surgeon: Faye Mac MD;  Location: [facility];  Service: Obstetrics/Gynecology   • URETHRAL DILATION      x3-4   • UVULECTOMY         Health Maintenance   Topic Date Due   • Hepatitis B (1 of 3 - 3-dose series) Never done   • URINE MICROALBUMIN  Never done   • ANNUAL PHYSICAL  Never done   • Pneumococcal Vaccine 0-64 (1 - PCV) Never done   • HEPATITIS C SCREENING  Never done   • DIABETIC FOOT EXAM  Never done   • DIABETIC EYE EXAM  Never done   • COVID-19 Vaccine (3 - Booster for Moderna series) 07/09/2021   • INFLUENZA VACCINE  08/01/2022   • PAP SMEAR  09/23/2022   • Annual Gynecologic Pelvic and Breast Exam  09/24/2022   • HEMOGLOBIN A1C  02/19/2023   • LIPID PANEL  08/19/2023   • MAMMOGRAM  10/31/2024   • COLORECTAL CANCER SCREENING  03/13/2028   • TDAP/TD VACCINES (2 - Td or Tdap) 04/27/2028   • ZOSTER VACCINE  Completed       The additional following portions of the patient's history were reviewed and updated as appropriate: allergies, current medications, past family history, past medical history, past social history and past surgical history.    Review of Systems    I have reviewed and agree with the HPI, ROS, and historical information as entered above. Faye Mac MD    Objective   /82   Wt 98.8 kg (217 lb 12.8 oz)   LMP 07/18/2019   BMI 37.39 kg/m²     Physical Exam  Vitals and nursing note reviewed. Exam conducted with a chaperone present.   Constitutional:       Appearance: She is well-developed.   HENT:      Head: Normocephalic and atraumatic.   Neck:      Thyroid: No thyroid mass or thyromegaly.   Cardiovascular:      Rate and Rhythm: Normal rate and regular rhythm.      Heart sounds: No murmur heard.  Pulmonary:      Effort:  Pulmonary effort is normal. No retractions.      Breath sounds: Normal breath sounds. No wheezing, rhonchi or rales.   Chest:      Chest wall: No mass or tenderness.   Breasts:     Right: Normal. No mass, nipple discharge, skin change or tenderness.      Left: Normal. No mass, nipple discharge, skin change or tenderness.   Abdominal:      General: Bowel sounds are normal.      Palpations: Abdomen is soft. Abdomen is not rigid. There is no mass.      Tenderness: There is no abdominal tenderness. There is no guarding.      Hernia: No hernia is present. There is no hernia in the left inguinal area or right inguinal area.   Genitourinary:     General: Normal vulva.      Exam position: Lithotomy position.      Pubic Area: No rash.       Labia:         Right: No rash, tenderness or lesion.         Left: No rash, tenderness or lesion.       Urethra: No urethral pain or urethral swelling.      Vagina: Normal. No vaginal discharge or lesions.      Uterus: Absent.       Adnexa:         Right: No mass, tenderness or fullness.          Left: No mass, tenderness or fullness.        Rectum: No external hemorrhoid.      Comments: Cervix surgically absent.  Vaginal cuff intact.  Musculoskeletal:      Cervical back: Normal range of motion. No muscular tenderness.   Neurological:      Mental Status: She is alert and oriented to person, place, and time.   Psychiatric:         Behavior: Behavior normal.            Assessment and Plan    Problem List Items Addressed This Visit    None  Visit Diagnoses     Pap test, as part of routine gynecological examination    -  Primary    Relevant Orders    LIQUID-BASED PAP SMEAR, [facility] ([facility])    CBC & Differential    Comprehensive Metabolic Panel    Hemoglobin A1c    Lipid Panel    Vitamin D,25-Hydroxy    TSH    T4, Free    Menopausal symptoms        Relevant Medications    estradiol (VIVELLE-DOT) 0.1 MG/24HR patch          1. GYN annual well woman exam.   2. Reviewed monthly self breast  exams.  Instructed to call with lumps, pain, or breast discharge.  Yearly mammograms ordered.  3. Ordered mammogram today.  4. Reviewed exercise as a preventative health measures.   5. Reviewed BMI and weight loss as preventative health measures.   6. Colonoscopy recommended.  7. RTC in 1 year or PRN with problems.  8. Return in about 1 year (around 11/10/2023) for Annual physical.     Faye Mac MD  11/10/2022

## 2022-11-11 LAB
25(OH)D3+25(OH)D2 SERPL-MCNC: 59.3 NG/ML (ref 30–100)
ALBUMIN SERPL-MCNC: 4.2 G/DL (ref 3.5–5.2)
ALBUMIN/GLOB SERPL: 1.7 G/DL
ALP SERPL-CCNC: 133 U/L (ref 39–117)
ALT SERPL-CCNC: 40 U/L (ref 1–33)
AST SERPL-CCNC: 27 U/L (ref 1–32)
BASOPHILS # BLD AUTO: 0.03 10*3/MM3 (ref 0–0.2)
BASOPHILS NFR BLD AUTO: 0.3 % (ref 0–1.5)
BILIRUB SERPL-MCNC: 0.4 MG/DL (ref 0–1.2)
BUN SERPL-MCNC: 12 MG/DL (ref 6–20)
BUN/CREAT SERPL: 17.6 (ref 7–25)
CALCIUM SERPL-MCNC: 9.4 MG/DL (ref 8.6–10.5)
CHLORIDE SERPL-SCNC: 99 MMOL/L (ref 98–107)
CHOLEST SERPL-MCNC: 106 MG/DL (ref 0–200)
CO2 SERPL-SCNC: 27 MMOL/L (ref 22–29)
CREAT SERPL-MCNC: 0.68 MG/DL (ref 0.57–1)
EGFRCR SERPLBLD CKD-EPI 2021: 103.6 ML/MIN/1.73
EOSINOPHIL # BLD AUTO: 0.15 10*3/MM3 (ref 0–0.4)
EOSINOPHIL NFR BLD AUTO: 1.5 % (ref 0.3–6.2)
ERYTHROCYTE [DISTWIDTH] IN BLOOD BY AUTOMATED COUNT: 13.6 % (ref 12.3–15.4)
GLOBULIN SER CALC-MCNC: 2.5 GM/DL
GLUCOSE SERPL-MCNC: 78 MG/DL (ref 65–99)
HBA1C MFR BLD: 6.4 % (ref 4.8–5.6)
HCT VFR BLD AUTO: 37 % (ref 34–46.6)
HDLC SERPL-MCNC: 42 MG/DL (ref 40–60)
HGB BLD-MCNC: 12.2 G/DL (ref 12–15.9)
IMM GRANULOCYTES # BLD AUTO: 0.09 10*3/MM3 (ref 0–0.05)
IMM GRANULOCYTES NFR BLD AUTO: 0.9 % (ref 0–0.5)
LDLC SERPL CALC-MCNC: 43 MG/DL (ref 0–100)
LYMPHOCYTES # BLD AUTO: 2.34 10*3/MM3 (ref 0.7–3.1)
LYMPHOCYTES NFR BLD AUTO: 24 % (ref 19.6–45.3)
MCH RBC QN AUTO: 27.5 PG (ref 26.6–33)
MCHC RBC AUTO-ENTMCNC: 33 G/DL (ref 31.5–35.7)
MCV RBC AUTO: 83.3 FL (ref 79–97)
MONOCYTES # BLD AUTO: 0.8 10*3/MM3 (ref 0.1–0.9)
MONOCYTES NFR BLD AUTO: 8.2 % (ref 5–12)
NEUTROPHILS # BLD AUTO: 6.35 10*3/MM3 (ref 1.7–7)
NEUTROPHILS NFR BLD AUTO: 65.1 % (ref 42.7–76)
NRBC BLD AUTO-RTO: 0.1 /100 WBC (ref 0–0.2)
PLATELET # BLD AUTO: 290 10*3/MM3 (ref 140–450)
POTASSIUM SERPL-SCNC: 3.8 MMOL/L (ref 3.5–5.2)
PROT SERPL-MCNC: 6.7 G/DL (ref 6–8.5)
RBC # BLD AUTO: 4.44 10*6/MM3 (ref 3.77–5.28)
SODIUM SERPL-SCNC: 138 MMOL/L (ref 136–145)
T4 FREE SERPL-MCNC: 1.07 NG/DL (ref 0.93–1.7)
TRIGL SERPL-MCNC: 117 MG/DL (ref 0–150)
TSH SERPL DL<=0.005 MIU/L-ACNC: 1.54 UIU/ML (ref 0.27–4.2)
VLDLC SERPL CALC-MCNC: 21 MG/DL (ref 5–40)
WBC # BLD AUTO: 9.76 10*3/MM3 (ref 3.4–10.8)

## 2022-11-14 LAB — REF LAB TEST METHOD: NORMAL

## 2023-02-20 ENCOUNTER — TELEPHONE (OUTPATIENT)
Dept: FAMILY MEDICINE CLINIC | Facility: CLINIC | Age: 58
End: 2023-02-20

## 2023-02-20 ENCOUNTER — OFFICE VISIT (OUTPATIENT)
Dept: FAMILY MEDICINE CLINIC | Facility: CLINIC | Age: 58
End: 2023-02-20
Payer: COMMERCIAL

## 2023-02-20 VITALS
DIASTOLIC BLOOD PRESSURE: 80 MMHG | OXYGEN SATURATION: 97 % | HEIGHT: 64 IN | RESPIRATION RATE: 20 BRPM | WEIGHT: 218.8 LBS | SYSTOLIC BLOOD PRESSURE: 140 MMHG | HEART RATE: 75 BPM | BODY MASS INDEX: 37.36 KG/M2

## 2023-02-20 DIAGNOSIS — I10 BENIGN ESSENTIAL HYPERTENSION: ICD-10-CM

## 2023-02-20 DIAGNOSIS — R91.1 LUNG NODULE SEEN ON IMAGING STUDY: ICD-10-CM

## 2023-02-20 DIAGNOSIS — Z23 NEED FOR INFLUENZA VACCINATION: ICD-10-CM

## 2023-02-20 DIAGNOSIS — Z11.59 NEED FOR HEPATITIS C SCREENING TEST: ICD-10-CM

## 2023-02-20 DIAGNOSIS — E87.6 HYPOKALEMIA: ICD-10-CM

## 2023-02-20 DIAGNOSIS — K76.0 FATTY LIVER: ICD-10-CM

## 2023-02-20 DIAGNOSIS — R10.13 EPIGASTRIC PAIN: ICD-10-CM

## 2023-02-20 DIAGNOSIS — Z79.899 ENCOUNTER FOR LONG-TERM (CURRENT) USE OF OTHER MEDICATIONS: ICD-10-CM

## 2023-02-20 DIAGNOSIS — N20.0 KIDNEY STONES: ICD-10-CM

## 2023-02-20 DIAGNOSIS — K21.9 GASTROESOPHAGEAL REFLUX DISEASE WITHOUT ESOPHAGITIS: ICD-10-CM

## 2023-02-20 DIAGNOSIS — D50.9 IRON DEFICIENCY ANEMIA, UNSPECIFIED IRON DEFICIENCY ANEMIA TYPE: ICD-10-CM

## 2023-02-20 DIAGNOSIS — G25.81 RESTLESS LEGS: ICD-10-CM

## 2023-02-20 DIAGNOSIS — J30.1 NON-SEASONAL ALLERGIC RHINITIS DUE TO POLLEN: ICD-10-CM

## 2023-02-20 DIAGNOSIS — G47.19 EXCESSIVE DAYTIME SLEEPINESS: ICD-10-CM

## 2023-02-20 DIAGNOSIS — D51.0 PERNICIOUS ANEMIA: ICD-10-CM

## 2023-02-20 DIAGNOSIS — E11.9 TYPE 2 DIABETES MELLITUS WITHOUT COMPLICATION, WITHOUT LONG-TERM CURRENT USE OF INSULIN: Primary | ICD-10-CM

## 2023-02-20 DIAGNOSIS — R11.0 NAUSEA: ICD-10-CM

## 2023-02-20 DIAGNOSIS — R53.83 OTHER FATIGUE: ICD-10-CM

## 2023-02-20 DIAGNOSIS — E78.2 MIXED HYPERLIPIDEMIA: ICD-10-CM

## 2023-02-20 LAB
POC CREATININE URINE: 17.7
POC MICROALBUMIN URINE: 30

## 2023-02-20 PROCEDURE — 36415 COLL VENOUS BLD VENIPUNCTURE: CPT | Performed by: FAMILY MEDICINE

## 2023-02-20 PROCEDURE — 82044 UR ALBUMIN SEMIQUANTITATIVE: CPT | Performed by: FAMILY MEDICINE

## 2023-02-20 PROCEDURE — 90471 IMMUNIZATION ADMIN: CPT | Performed by: FAMILY MEDICINE

## 2023-02-20 PROCEDURE — 90686 IIV4 VACC NO PRSV 0.5 ML IM: CPT | Performed by: FAMILY MEDICINE

## 2023-02-20 PROCEDURE — 99214 OFFICE O/P EST MOD 30 MIN: CPT | Performed by: FAMILY MEDICINE

## 2023-02-20 RX ORDER — POTASSIUM CHLORIDE 20 MEQ/1
20 TABLET, EXTENDED RELEASE ORAL DAILY
Qty: 90 TABLET | Refills: 1 | Status: SHIPPED | OUTPATIENT
Start: 2023-02-20

## 2023-02-20 RX ORDER — NAPROXEN 500 MG/1
500 TABLET ORAL 2 TIMES DAILY PRN
Qty: 180 TABLET | Refills: 1 | Status: SHIPPED | OUTPATIENT
Start: 2023-02-20

## 2023-02-20 RX ORDER — TRAZODONE HYDROCHLORIDE 100 MG/1
TABLET ORAL
Qty: 90 TABLET | Refills: 3 | Status: SHIPPED | OUTPATIENT
Start: 2023-02-20

## 2023-02-20 RX ORDER — AMLODIPINE BESYLATE 5 MG/1
5 TABLET ORAL DAILY
Qty: 90 TABLET | Refills: 1 | Status: SHIPPED | OUTPATIENT
Start: 2023-02-20

## 2023-02-20 RX ORDER — TRIAMTERENE AND HYDROCHLOROTHIAZIDE 37.5; 25 MG/1; MG/1
1 TABLET ORAL DAILY
Qty: 90 TABLET | Refills: 1 | Status: SHIPPED | OUTPATIENT
Start: 2023-02-20

## 2023-02-20 RX ORDER — CYCLOBENZAPRINE HCL 10 MG
10 TABLET ORAL 2 TIMES DAILY PRN
Qty: 180 TABLET | Refills: 1 | Status: SHIPPED | OUTPATIENT
Start: 2023-02-20

## 2023-02-21 LAB
ALBUMIN SERPL-MCNC: 4.1 G/DL (ref 3.8–4.9)
ALBUMIN/GLOB SERPL: 1.6 {RATIO} (ref 1.2–2.2)
ALP SERPL-CCNC: 132 IU/L (ref 44–121)
ALT SERPL-CCNC: 22 IU/L (ref 0–32)
AST SERPL-CCNC: 16 IU/L (ref 0–40)
BASOPHILS # BLD AUTO: 0 X10E3/UL (ref 0–0.2)
BASOPHILS NFR BLD AUTO: 0 %
BILIRUB SERPL-MCNC: 0.4 MG/DL (ref 0–1.2)
BUN SERPL-MCNC: 21 MG/DL (ref 6–24)
BUN/CREAT SERPL: 30 (ref 9–23)
CALCIUM SERPL-MCNC: 8.9 MG/DL (ref 8.7–10.2)
CHLORIDE SERPL-SCNC: 101 MMOL/L (ref 96–106)
CO2 SERPL-SCNC: 24 MMOL/L (ref 20–29)
CREAT SERPL-MCNC: 0.69 MG/DL (ref 0.57–1)
EGFRCR SERPLBLD CKD-EPI 2021: 102 ML/MIN/1.73
EOSINOPHIL # BLD AUTO: 0.1 X10E3/UL (ref 0–0.4)
EOSINOPHIL NFR BLD AUTO: 2 %
ERYTHROCYTE [DISTWIDTH] IN BLOOD BY AUTOMATED COUNT: 14.3 % (ref 11.7–15.4)
FERRITIN SERPL-MCNC: 46 NG/ML (ref 15–150)
GLOBULIN SER CALC-MCNC: 2.5 G/DL (ref 1.5–4.5)
GLUCOSE SERPL-MCNC: 136 MG/DL (ref 70–99)
HBA1C MFR BLD: 7 % (ref 4.8–5.6)
HCT VFR BLD AUTO: 42.2 % (ref 34–46.6)
HCV IGG SERPL QL IA: NON REACTIVE
HGB BLD-MCNC: 14.1 G/DL (ref 11.1–15.9)
IMM GRANULOCYTES # BLD AUTO: 0 X10E3/UL (ref 0–0.1)
IMM GRANULOCYTES NFR BLD AUTO: 0 %
IRON SERPL-MCNC: 63 UG/DL (ref 27–159)
LYMPHOCYTES # BLD AUTO: 1.6 X10E3/UL (ref 0.7–3.1)
LYMPHOCYTES NFR BLD AUTO: 22 %
MCH RBC QN AUTO: 27.1 PG (ref 26.6–33)
MCHC RBC AUTO-ENTMCNC: 33.4 G/DL (ref 31.5–35.7)
MCV RBC AUTO: 81 FL (ref 79–97)
MONOCYTES # BLD AUTO: 0.6 X10E3/UL (ref 0.1–0.9)
MONOCYTES NFR BLD AUTO: 8 %
NEUTROPHILS # BLD AUTO: 5 X10E3/UL (ref 1.4–7)
NEUTROPHILS NFR BLD AUTO: 68 %
PLATELET # BLD AUTO: 259 X10E3/UL (ref 150–450)
POTASSIUM SERPL-SCNC: 4.2 MMOL/L (ref 3.5–5.2)
PROT SERPL-MCNC: 6.6 G/DL (ref 6–8.5)
RBC # BLD AUTO: 5.21 X10E6/UL (ref 3.77–5.28)
SODIUM SERPL-SCNC: 142 MMOL/L (ref 134–144)
T4 FREE SERPL-MCNC: 1.31 NG/DL (ref 0.82–1.77)
TSH SERPL DL<=0.005 MIU/L-ACNC: 1.69 UIU/ML (ref 0.45–4.5)
VIT B12 SERPL-MCNC: 336 PG/ML (ref 232–1245)
WBC # BLD AUTO: 7.4 X10E3/UL (ref 3.4–10.8)

## 2023-02-21 NOTE — TELEPHONE ENCOUNTER
I have been looking through the patient's old records and I cannot find any gastroenterology consults, nor any records of EGD or colonoscopy, and feel quite certain that she has had those things done in the last 5 to 7 years.  Can you please see if you can find them?  If nothing else, contact the patient and find out which GI doctor she has been to in the past and call and get records.  Thanks

## 2023-02-21 NOTE — TELEPHONE ENCOUNTER
CALLED PT AND ASK HER ABOUT WHEN HER LAST COLONOSCOPY WAS AND SHE SAID SHE WILL JUST BRING THE PAPERS UP HER AND DROP THEM OFF. SHE JUST GOT A NEW LETTER STATING IT IS TIME TO GET HER COLONOSCOPY.

## 2023-02-21 NOTE — PROGRESS NOTES
Chief Complaint  Diabetes    Subjective      Alia Nancie Garza presents to [facility]  History of Present Illness  Patient is here for 6-month checkup on diabetes along with other problems.  She says her blood pressures been a bit high lately, running 126 256/ over the last week or 2, although she denies any symptoms currently.  She does still take Maxide 37.5/25 once a day.  There have been no significant changes in her diet or exercise regimen lately.  She has been under quite a bit of additional stress because of health problems and family members, including her .  Patient did have angioedema with lisinopril in the past and therefore cannot take ACE inhibitors.  Her migraines are not as frequent as they used to be, but when she has them she does get relief from Imitrex nasal spray.  Her diabetes continues to be stable on metformin.  The patient's restless leg syndrome symptoms are fairly well controlled.  She has a history of sleep apnea diagnosed in the past, for which she had throat surgery, and was told many years ago that she no longer has sleep apnea.  However, as she has gotten older, she feels unrested in the morning and has a lot of daytime drowsiness if she is not up moving around.  She does have a BMI of 37.5, and given the recently uncontrolled hypertension we must be suspicious that she has redeveloped sleep apnea.    We did discuss the possibility of trying a GLP-1 agent for weight loss and management of her diabetes, but her A1c has been below 7 with metformin alone for a long time.  In addition, she has GERD and irritable bowel syndrome.  She is also been having episodes of epigastric pain that last for a couple of hours, although it is only happening about once a month.  It is never severe enough to warrant a trip to the emergency department.  The pain is not associated with any vomiting but she does have some nausea at times.  Denies any blood in stool or  "melena denies any dysphagia or diet aphasia or fever.  She has had some mild degree of postprandial nausea fairly regularly for a couple of years, but no weight loss.  We discussed the differential diagnosis of this including gallstones, gallbladder dysfunction, gastroparesis, ulcer, and other various pathology, but she says the symptoms are really not bad enough at this time to pursue work-up, but she will let me know if that changes.    Her migraines  Objective   Vital Signs:   Vitals:    02/20/23 0818   BP: 140/80   Pulse: 75   Resp: 20   SpO2: 97%   Weight: 99.2 kg (218 lb 12.8 oz)   Height: 162.6 cm (64\")      /80   Pulse 75   Resp 20   Ht 162.6 cm (64\")   Wt 99.2 kg (218 lb 12.8 oz)   SpO2 97%   BMI 37.56 kg/m²     Body mass index is 37.56 kg/m².    Review of Systems   Constitutional: Positive for fatigue. Negative for activity change, appetite change, chills, diaphoresis, fever and unexpected weight loss.   HENT: Negative for congestion, ear discharge, ear pain, facial swelling, mouth sores, nosebleeds, rhinorrhea, sinus pressure, sore throat, swollen glands, trouble swallowing and voice change.    Eyes: Negative for blurred vision, double vision, pain, redness and visual disturbance.   Respiratory: Negative for cough, chest tightness, shortness of breath and wheezing.    Cardiovascular: Negative for chest pain, palpitations and leg swelling.        PND, orthopnea   Gastrointestinal: Positive for abdominal pain, nausea and indigestion. Negative for abdominal distention, anal bleeding, blood in stool, constipation, diarrhea, vomiting and GERD.        Dysphagia, odynophagia   Endocrine: Negative for polydipsia, polyphagia and polyuria.   Genitourinary: Negative for dysuria, frequency, hematuria, urgency and urinary incontinence.   Musculoskeletal: Positive for neck pain (Recurrent, somewhat chronic and stable). Negative for arthralgias (unusual/atypica), back pain, gait problem, joint swelling and " myalgias.   Skin: Negative for rash, skin lesions (worrisome/suspicious), wound and bruise.   Allergic/Immunologic: Positive for environmental allergies. Negative for food allergies.   Neurological: Negative for dizziness, tremors, seizures, syncope, speech difficulty, weakness, light-headedness, numbness, headache and memory problem.   Hematological: Negative for adenopathy. Does not bruise/bleed easily.   Psychiatric/Behavioral: Positive for sleep disturbance. Negative for dysphoric mood, suicidal ideas and depressed mood. The patient is not nervous/anxious.        Past History:  Medical History: has a past medical history of Abnormal Pap smear of cervix, Anemia, Angioedema (2008), AR (allergic rhinitis), Benign essential HTN, Carpal tunnel syndrome, Chronic fatigue syndrome, DM type 2 with diabetic dyslipidemia (HCC), Dyspareunia, female, Endometriosis, Fatty liver (2022), GERD (gastroesophageal reflux disease), Headache, High risk medication use, Hyperglycemia, Hyperlipidemia, Hypertension, Iron deficiency anemia, Kidney stone (07/2022), Low iron, Lung nodule seen on imaging study (07/2022), Migraine, Obesity, Ovarian cyst (1995), Pernicious anemia, Polycystic disease, ovaries, Prediabetes, Pregnancy (2001), Recurrent UTI, RLS (restless legs syndrome), Sleep apnea, and Type 2 diabetes mellitus without complication (HCC).   Surgical History: has a past surgical history that includes Tonsillectomy; Colonoscopy (2018); Bladder surgery; Exploratory laparotomy; Carpal tunnel release (Right); LASIK (2015); total laparoscopic hysterectomy salpingo oophorectomy (N/A, 09/11/2019); Oophorectomy; Urethral dilation; Uvulectomy; Total abdominal hysterectomy w/ bilateral salpingoophorectomy (09/2019); and Laparoscopy.   Family History: family history includes ADD / ADHD in her son; Alcohol abuse in her maternal grandfather; Breast cancer (age of onset: 39) in her maternal aunt; Breast cancer (age of onset: 92) in her maternal  grandmother; Diabetes in some other family members; Hyperlipidemia in her father; Hypertension in her father; Migraines in her mother; Prostate cancer in her paternal grandfather; Thyroid disease in her mother.   Social History: reports that she quit smoking about 33 years ago. Her smoking use included cigarettes. She has a 1.50 pack-year smoking history. She has never used smokeless tobacco. She reports that she does not drink alcohol and does not use drugs.      Current Outpatient Medications:   •  Aspirin Buf,CaCarb-MgCarb-MgO, 81 MG tablet, Take 1 tablet by mouth., Disp: , Rfl:   •  atorvastatin (LIPITOR) 20 MG tablet, Take 1 tablet by mouth Daily., Disp: 90 tablet, Rfl: 3  •  cetirizine (zyrTEC) 10 MG tablet, Take 1 tablet by mouth Daily., Disp: 90 tablet, Rfl: 3  •  cyclobenzaprine (FLEXERIL) 10 MG tablet, Take 1 tablet by mouth 2 (Two) Times a Day As Needed for Muscle Spasms., Disp: 180 tablet, Rfl: 1  •  cycloSPORINE (RESTASIS) 0.05 % ophthalmic emulsion, Administer 1 drop to both eyes 2 (Two) Times a Day., Disp: , Rfl:   •  EPINEPHrine (EPIPEN) 0.3 MG/0.3ML solution auto-injector injection, epinephrine 0.3 mg/0.3 mL injection, auto-injector, Disp: , Rfl:   •  estradiol (VIVELLE-DOT) 0.1 MG/24HR patch, Place 1 patch on the skin as directed by provider 2 (Two) Times a Week. Please use generic, Disp: 24 each, Rfl: 3  •  ferrous sulfate 140 (45 Fe) MG tablet controlled-release tablet, Take  by mouth Daily With Breakfast., Disp: , Rfl:   •  fluticasone (FLONASE) 50 MCG/ACT nasal spray, 2 sprays into the nostril(s) as directed by provider Daily., Disp: 48 g, Rfl: 3  •  naproxen (NAPROSYN) 500 MG tablet, Take 1 tablet by mouth 2 (Two) Times a Day As Needed for Moderate Pain (inflammation)., Disp: 180 tablet, Rfl: 1  •  NON FORMULARY, Inject  under the skin into the appropriate area as directed 2 (Two) Times a Week. TUESDAYS AND THURSDAYS; Allergy shots, Disp: , Rfl:   •  olopatadine (PATADAY) 0.2 % solution  ophthalmic solution, Administer 1 drop to both eyes Daily., Disp: 7.5 mL, Rfl: 3  •  rOPINIRole (REQUIP) 2 MG tablet, Take 1 tablet by mouth 2 (Two) Times a Day., Disp: 180 tablet, Rfl: 3  •  SUMAtriptan (Imitrex) 20 MG/ACT nasal spray, 1 spray into the nostril(s) as directed by provider Every 2 (Two) Hours As Needed for Migraine., Disp: 1 each, Rfl: 11  •  amLODIPine (NORVASC) 5 MG tablet, Take 1 tablet by mouth Daily., Disp: 90 tablet, Rfl: 1  •  metFORMIN (GLUCOPHAGE) 500 MG tablet, Take 1 tablet by mouth 2 (Two) Times a Day With Meals., Disp: 180 tablet, Rfl: 1  •  potassium chloride (K-DUR,KLOR-CON) 20 MEQ CR tablet, Take 1 tablet by mouth Daily. with food., Disp: 90 tablet, Rfl: 1  •  traZODone (DESYREL) 100 MG tablet, Take 1/2 to 1 po qhs prn insomnia, Disp: 90 tablet, Rfl: 3  •  triamterene-hydrochlorothiazide (MAXZIDE-25) 37.5-25 MG per tablet, Take 1 tablet by mouth Daily. for blood pressure, Disp: 90 tablet, Rfl: 1    Allergies: Ace inhibitors and Lisinopril    Physical Exam  Constitutional:       General: She is not in acute distress.     Appearance: She is obese. She is not toxic-appearing or diaphoretic.   HENT:      Head: Normocephalic and atraumatic.      Right Ear: Tympanic membrane, ear canal and external ear normal.      Left Ear: Tympanic membrane, ear canal and external ear normal.      Nose: Nose normal.      Mouth/Throat:      Mouth: Mucous membranes are moist.      Pharynx: Oropharynx is clear.   Eyes:      General: No scleral icterus.     Extraocular Movements: Extraocular movements intact.      Conjunctiva/sclera: Conjunctivae normal.      Pupils: Pupils are equal, round, and reactive to light.   Neck:      Thyroid: No thyromegaly.      Vascular: No carotid bruit.      Trachea: Trachea normal.      Comments: Thick  Cardiovascular:      Rate and Rhythm: Normal rate and regular rhythm.      Pulses: Normal pulses.      Heart sounds: Normal heart sounds.   Pulmonary:      Effort: Pulmonary  effort is normal.      Breath sounds: Normal breath sounds.   Chest:      Chest wall: No tenderness.   Abdominal:      General: Bowel sounds are normal. There is no distension.      Palpations: Abdomen is soft.      Tenderness: There is no abdominal tenderness. There is no guarding or rebound.   Musculoskeletal:         General: No swelling or deformity. Normal range of motion.      Cervical back: Normal range of motion. No rigidity.      Right lower leg: No edema.      Left lower leg: No edema.   Lymphadenopathy:      Cervical: No cervical adenopathy.   Skin:     General: Skin is warm and dry.      Capillary Refill: Capillary refill takes less than 2 seconds.   Neurological:      General: No focal deficit present.      Mental Status: She is alert and oriented to person, place, and time. Mental status is at baseline.      Cranial Nerves: No cranial nerve deficit, dysarthria or facial asymmetry.      Motor: No weakness, tremor or atrophy.      Coordination: Coordination normal.      Gait: Gait normal.   Psychiatric:         Attention and Perception: Attention and perception normal.         Mood and Affect: Mood and affect normal.         Speech: Speech normal.         Behavior: Behavior normal.         Thought Content: Thought content normal.         Cognition and Memory: Cognition and memory normal.         Judgment: Judgment normal.                   Assessment and Plan   Diagnoses and all orders for this visit:    1. Type 2 diabetes mellitus without complication, without long-term current use of insulin (HCC) (Primary)  -     POC Microalbumin  -     Hemoglobin A1c; Future  -     Hemoglobin A1c  Diabetes continues to be well controlled with metformin 500 mg twice a day.  She is interested in trying GLP-1 agents, but when we discussed the possible GI side effects, given her other GI issues, we both agree that probably would not be her best approach right now.  She may be a good candidate for SGLT2 agent in the  future since her blood pressure is gone up, but I really think we need to find out if her sleep apnea has returned, and if so then she needs treatment for that.  We will check labs today and refill her current medications  2. Need for influenza vaccination  -     FluLaval/Fluarix/Fluzone >6 Months    3. Benign essential hypertension  Patient had angioedema with lisinopril, and therefore there is an increased risk of the same from angiotensin receptor blocker, although that is controversy O.  Nonetheless, we will try amlodipine 5 mg daily for the time being, side effects and risk discussed, if blood pressure does not come down in the desirable range within 2 to 3 weeks she will let me know  4. Hypokalemia    5. Gastroesophageal reflux disease without esophagitis    6. Mixed hyperlipidemia  -     Lipid Panel; Future  -     Lipid Panel    7. Encounter for long-term (current) use of other medications  -     CBC & Differential; Future  -     Comprehensive Metabolic Panel; Future  -     CBC & Differential  -     Comprehensive Metabolic Panel    8. Restless legs    9. Pernicious anemia  -     Vitamin B12; Future  -     Vitamin B12    10. Iron deficiency anemia, unspecified iron deficiency anemia type  -     Ferritin; Future  -     Iron; Future  -     Ferritin  -     Iron  Patient has had iron deficiency anemia in the past but when rechecked the last couple of years her iron and ferritin have been okay, so we will recheck today as a precaution because of her recurrent episodes of epigastric pain  11. Non-seasonal allergic rhinitis due to pollen    12. Need for hepatitis C screening test  -     Hepatitis C Antibody; Future  -     Hepatitis C Antibody    13. Other fatigue  -     TSH+Free T4; Future  -     TSH+Free T4    14. Excessive daytime sleepiness  -     Home Sleep Study; Future  Patient certainly has symptoms suggestive of obstructive sleep apnea, and agrees to do an in-home sleep study.  If positive we will initiate  treatment  15. Epigastric pain  If symptoms continue to bother the patient and she will let me know so we can get a gallbladder ultrasound and refer to gastroenterology for EGD.  If she develops any significant prolonged pain, or has any associated fever, vomiting, or evidence of bleeding or distention, and that she will go to the emergency room.  16. Nausea  17. Fatty Liver    18. Lung nodule seen on imaging study--follow-up CT of the chest in July 2022 showed stable, recommended follow-up in 1 year, which will be July of this year .  I failed to discuss this with patient while here in the office but called it when I was entering her note in the chart so I will notify her of the need for follow-up CT in July.  19. Kidney stones--these were nonobstructive but seen on the left with CT scan July 2022, currently asymptomatic  Other orders  -     amLODIPine (NORVASC) 5 MG tablet; Take 1 tablet by mouth Daily.  Dispense: 90 tablet; Refill: 1  -     naproxen (NAPROSYN) 500 MG tablet; Take 1 tablet by mouth 2 (Two) Times a Day As Needed for Moderate Pain (inflammation).  Dispense: 180 tablet; Refill: 1  -     cyclobenzaprine (FLEXERIL) 10 MG tablet; Take 1 tablet by mouth 2 (Two) Times a Day As Needed for Muscle Spasms.  Dispense: 180 tablet; Refill: 1            Follow Up   Return in about 6 months (around 8/20/2023) for Annual physical.  Patient was given instructions and counseling regarding her condition or for health maintenance advice. Please see specific information pulled into the AVS if appropriate.     Jhon Edmond MD

## 2023-02-23 LAB
CHOLEST SERPL-MCNC: NORMAL MG/DL
HDLC SERPL-MCNC: NORMAL MG/DL
SPECIMEN STATUS: NORMAL
TRIGL SERPL-MCNC: NORMAL MG/DL
VLDLC SERPL CALC-MCNC: NORMAL MG/DL

## 2023-02-24 LAB
CHOLEST SERPL-MCNC: 115 MG/DL (ref 100–199)
HDLC SERPL-MCNC: 37 MG/DL
LDLC SERPL CALC-MCNC: 56 MG/DL (ref 0–99)
TRIGL SERPL-MCNC: 123 MG/DL (ref 0–149)
VLDLC SERPL CALC-MCNC: 22 MG/DL (ref 5–40)

## 2023-03-01 ENCOUNTER — TELEPHONE (OUTPATIENT)
Dept: FAMILY MEDICINE CLINIC | Facility: CLINIC | Age: 58
End: 2023-03-01
Payer: COMMERCIAL

## 2023-03-01 NOTE — TELEPHONE ENCOUNTER
Caller: Alia Garza    Relationship: Self    Best call back number: [phone]    What medications are you currently taking:   Current Outpatient Medications on File Prior to Visit   Medication Sig Dispense Refill   • amLODIPine (NORVASC) 5 MG tablet Take 1 tablet by mouth Daily. 90 tablet 1   • Aspirin Buf,CaCarb-MgCarb-MgO, 81 MG tablet Take 1 tablet by mouth.     • atorvastatin (LIPITOR) 20 MG tablet Take 1 tablet by mouth Daily. 90 tablet 3   • cetirizine (zyrTEC) 10 MG tablet Take 1 tablet by mouth Daily. 90 tablet 3   • cyclobenzaprine (FLEXERIL) 10 MG tablet Take 1 tablet by mouth 2 (Two) Times a Day As Needed for Muscle Spasms. 180 tablet 1   • cycloSPORINE (RESTASIS) 0.05 % ophthalmic emulsion Administer 1 drop to both eyes 2 (Two) Times a Day.     • EPINEPHrine (EPIPEN) 0.3 MG/0.3ML solution auto-injector injection epinephrine 0.3 mg/0.3 mL injection, auto-injector     • estradiol (VIVELLE-DOT) 0.1 MG/24HR patch Place 1 patch on the skin as directed by provider 2 (Two) Times a Week. Please use generic 24 each 3   • ferrous sulfate 140 (45 Fe) MG tablet controlled-release tablet Take  by mouth Daily With Breakfast.     • fluticasone (FLONASE) 50 MCG/ACT nasal spray 2 sprays into the nostril(s) as directed by provider Daily. 48 g 3   • metFORMIN (GLUCOPHAGE) 500 MG tablet Take 1 tablet by mouth 2 (Two) Times a Day With Meals. 180 tablet 1   • naproxen (NAPROSYN) 500 MG tablet Take 1 tablet by mouth 2 (Two) Times a Day As Needed for Moderate Pain (inflammation). 180 tablet 1   • NON FORMULARY Inject  under the skin into the appropriate area as directed 2 (Two) Times a Week. TUESDAYS AND THURSDAYS; Allergy shots     • olopatadine (PATADAY) 0.2 % solution ophthalmic solution Administer 1 drop to both eyes Daily. 7.5 mL 3   • potassium chloride (K-DUR,KLOR-CON) 20 MEQ CR tablet Take 1 tablet by mouth Daily. with food. 90 tablet 1   • rOPINIRole (REQUIP) 2 MG tablet Take 1 tablet by mouth 2 (Two) Times a  Day. 180 tablet 3   • SUMAtriptan (Imitrex) 20 MG/ACT nasal spray 1 spray into the nostril(s) as directed by provider Every 2 (Two) Hours As Needed for Migraine. 1 each 11   • traZODone (DESYREL) 100 MG tablet Take 1/2 to 1 po qhs prn insomnia 90 tablet 3   • triamterene-hydrochlorothiazide (MAXZIDE-25) 37.5-25 MG per tablet Take 1 tablet by mouth Daily. for blood pressure 90 tablet 1     No current facility-administered medications on file prior to visit.        What are your concerns: PT STATED THAT SHE IS NOT SURE WHETHER SHE SHOULD BE TAKING HER NEW BLOOD PRESSURE MEDICATION(NOT SURE OF NAME) IN PLACE OF RX     triamterene-hydrochlorothiazide (MAXZIDE-25) 37.5-25 MG per tablet      OR IN ADDITION TO.

## 2023-03-03 NOTE — TELEPHONE ENCOUNTER
Attempted to reach pt. [facility] has not been set up    FOR HUB: Please inform pt,   She was already taking the maxzide and blood pressure was high, so the blood pressure medicine was ADDED TO her list. Take  both.

## 2023-03-06 NOTE — TELEPHONE ENCOUNTER
Lior Gonsales MA EB    11:02 AM  Note    Attempted to reach pt x2. B has not been set up     FOR HUB: Please inform pt,   She was already taking the maxzide and blood pressure was high, so the blood pressure medicine was ADDED TO her list. Take  both.                   PATIENT INFORMED AND UNDERSTOOD

## 2023-03-06 NOTE — TELEPHONE ENCOUNTER
Attempted to reach pt x2. [facility] has not been set up     FOR HUB: Please inform pt,   She was already taking the maxzide and blood pressure was high, so the blood pressure medicine was ADDED TO her list. Take  both.

## 2023-03-23 DIAGNOSIS — R91.8 PULMONARY NODULES/LESIONS, MULTIPLE: Primary | ICD-10-CM

## 2023-04-25 ENCOUNTER — TELEPHONE (OUTPATIENT)
Dept: FAMILY MEDICINE CLINIC | Facility: CLINIC | Age: 58
End: 2023-04-25
Payer: COMMERCIAL

## 2023-04-25 NOTE — TELEPHONE ENCOUNTER
I sent [employer] message, but please notify pt,has sleep apnea, with 13.9 events per hour. I have written order for Auto-pap, see where she wants it faxed, and fax all the stuff ALONG WITH COPY OF MOST RECENT CLINIC NOTE to facility of her choice. It's in TO DO box. Thanks!

## 2023-04-25 NOTE — TELEPHONE ENCOUNTER
I sent [employer] message, but please notify pt,has sleep apnea, with 13.9 events per hour. I have written order for Auto-pap, see where she wants it faxed, and fax all the stuff ALONG WITH COPY OF MOST RECENT CLINIC NOTE to facility of her choice. It's in TO DO box. Thanks!  Called Nancie  states she must check insurance before doing this.

## 2023-04-25 NOTE — TELEPHONE ENCOUNTER
I sent [employer] message, but please notify pt,has sleep apnea, with 13.9 events per hour. I have written order for Auto-pap, see where she wants it faxed, and fax all the stuff ALONG WITH COPY OF MOST RECENT CLINIC NOTE to facility of her choice. It's in TO DO box. Thanks!  Called Nancie  states she must check insurance before doing this.         Note       Faxing to [facility] per pt request

## 2023-05-05 ENCOUNTER — TELEPHONE (OUTPATIENT)
Dept: FAMILY MEDICINE CLINIC | Facility: CLINIC | Age: 58
End: 2023-05-05

## 2023-05-05 NOTE — TELEPHONE ENCOUNTER
Caller: BLANCO WITH [facility]    Relationship: Provider    Best call back number: [phone]    What form or medical record are you requesting: CHART NOTES SIGNED AND DATED BEFORE MARCH 20TH     Who is requesting this form or medical record from you: INSURANCE     How would you like to receive the form or medical records (pick-up, mail, fax): FAX   If fax, what is the fax number: [phone]      Timeframe paperwork needed: AS SOON AS POSSIBLE     Additional notes: PLEASE FAX OVER SIGNED AND DATED COPY

## 2023-05-07 RX ORDER — SEMAGLUTIDE 1.34 MG/ML
INJECTION, SOLUTION SUBCUTANEOUS
Qty: 1.5 ML | Refills: 2 | Status: SHIPPED | OUTPATIENT
Start: 2023-05-07

## 2023-05-10 DIAGNOSIS — R10.13 EPIGASTRIC PAIN: Primary | ICD-10-CM

## 2023-05-12 ENCOUNTER — TELEPHONE (OUTPATIENT)
Dept: FAMILY MEDICINE CLINIC | Facility: CLINIC | Age: 58
End: 2023-05-12

## 2023-05-12 NOTE — TELEPHONE ENCOUNTER
HUB TO READ    LEFT VOICEMAIL TO GIVE PT HER GALLBLADDER ULTRASOUND APPOINTMENT, IT IS SCHEDULED AT [facility] 5/23/2023 AT 9:30 AM THEY ASK THAT YOU ARRIVE AT 9:00 AM

## 2023-06-12 RX ORDER — HYDROCHLOROTHIAZIDE 25 MG/1
25 TABLET ORAL DAILY
Qty: 90 TABLET | Refills: 0 | Status: SHIPPED | OUTPATIENT
Start: 2023-06-12

## 2023-06-12 RX ORDER — LOSARTAN POTASSIUM 50 MG/1
50 TABLET ORAL DAILY
Qty: 90 TABLET | Refills: 0 | Status: SHIPPED | OUTPATIENT
Start: 2023-06-12

## 2023-08-08 NOTE — PROGRESS NOTES
New Patient     Name: Alia Garza    : 1965     MRN: [id number]     DOS: 8/10/2023  Referred By: Jhon Edmond MD    Chief Complaint  TAVR (Non-rheumatic Tricuspid Valve Regurgitation), lower extremity edema.    Subjective     History of Present Illness:  Alia Garza is a 57 y.o. female with history of hypertension, hyperlipidemia, and type 2 diabetes who presents today for abnormal echocardiogram and intermittent edema.     Mrs. Garza presents today on referral from Dr. Edmond for evaluation of an abnormal echocardiogram and lower extremity edema.  Her lower extreme edema has been going on for over a year and there has been improvement with changes in her antihypertensive regimen as well as initiation of torsemide.  She mainly notices that at the end of the day, especially after sitting at her desk, she will have swelling in her ankles and if she takes her torsemide and goes to bed this is resolved in the morning.  Her weight is stable.    She also notes that for about the past year she has felt like she is having shortness of breath.  This seems to be getting worse is associated with walking short distances and climbing stairs.  Not noticeably different in the past several months but this was not her several years ago.  This is not associated with any chest pain, chest pressure, or palpitations.  Does not necessarily limit her but does slow her down.    She also notes recently starting CPAP.  She believes she still snores and is tired throughout the day and will fall asleep.  She has follow-up for this in a few weeks.    She is a former smoker with a minimal pack history.  Family history is notable for MI in her father.    Objective     Past Medical History:   Diagnosis Date    Abnormal Pap smear of cervix     Anemia     PRENICIOUS    Angioedema     FROM LISINOPRIL    AR (allergic rhinitis)     Benign essential HTN     Carpal tunnel syndrome     Chronic fatigue syndrome     DM type 2 with diabetic  dyslipidemia     Dyspareunia, female     Endometriosis     Fatty liver 2022    Noted on CT scan    GERD (gastroesophageal reflux disease)     Headache     High risk medication use     Hyperglycemia     Hyperlipidemia     ASSOCIATED WITH TYPE 2 DIABETES MELLITUS    Hypertension     Iron deficiency anemia     Kidney stone 07/2022    Nonobstructive, seen on the left on CT scan of chest    Low iron     Lung nodule seen on imaging study 07/2022    Stable from 6 months prior, follow-up recommended in 1 year    Migraine     Obesity     Ovarian cyst 1995    Pernicious anemia     Polycystic disease, ovaries     Prediabetes     diet controlled    Pregnancy 2001    VAGINAL FORCEPS    Recurrent UTI     RLS (restless legs syndrome)     Sleep apnea     Type 2 diabetes mellitus without complication      Past Surgical History:   Procedure Laterality Date    BLADDER SURGERY      bladder rectum tacted     CARPAL TUNNEL RELEASE Right     COLONOSCOPY  2018    DIAGNOSTIC LAPAROSCOPY      EXPLORATORY LAPAROTOMY      x2 endo    LASIK  2015    OOPHORECTOMY      [facility]/BSO 2019 for endo    TONSILLECTOMY      TOTAL ABDOMINAL HYSTERECTOMY WITH SALPINGO OOPHORECTOMY  09/2019    TOTAL LAPAROSCOPIC HYSTERECTOMY SALPINGO OOPHORECTOMY N/A 09/11/2019    Procedure: TOTAL LAPAROSCOPIC HYSTERECTOMY BILATERAL SALPINGECTOMY, OOPHORECTOMY, CYSTOSCOPY;  Surgeon: Faye Mac MD;  Location: [facility];  Service: Obstetrics/Gynecology    URETHRAL DILATION      x3-4    UVULECTOMY       Family History   Problem Relation Age of Onset    Migraines Mother     Thyroid disease Mother     Hypertension Father     Hyperlipidemia Father     Breast cancer Maternal Grandmother 92    Alcohol abuse Maternal Grandfather     Prostate cancer Paternal Grandfather     Breast cancer Maternal Aunt 39    Diabetes Other     Diabetes Other     ADD / ADHD Son     Ovarian cancer Neg Hx     Endometrial cancer Neg Hx     Uterine cancer Neg Hx     Colon cancer Neg Hx      Social History      Socioeconomic History    Marital status:    Tobacco Use    Smoking status: Former     Packs/day: 0.50     Years: 3.00     Pack years: 1.50     Types: Cigarettes     Quit date: 9/10/1989     Years since quittin.9    Smokeless tobacco: Never   Vaping Use    Vaping Use: Never used   Substance and Sexual Activity    Alcohol use: No    Drug use: No    Sexual activity: Yes     Partners: Male     Birth control/protection: Surgical     Current Outpatient Medications on File Prior to Visit   Medication Sig Dispense Refill    Aspirin Buf,CaCarb-MgCarb-MgO, 81 MG tablet Take 1 tablet by mouth.      atorvastatin (LIPITOR) 20 MG tablet Take 1 tablet by mouth Daily. 90 tablet 3    cetirizine (zyrTEC) 10 MG tablet Take 1 tablet by mouth Daily. 90 tablet 3    cyclobenzaprine (FLEXERIL) 10 MG tablet Take 1 tablet by mouth 2 (Two) Times a Day As Needed for Muscle Spasms. (Patient taking differently: Take 1 tablet by mouth As Needed for Muscle Spasms.) 180 tablet 1    cycloSPORINE (RESTASIS) 0.05 % ophthalmic emulsion Administer 1 drop to both eyes 2 (Two) Times a Day.      EPINEPHrine (EPIPEN) 0.3 MG/0.3ML solution auto-injector injection epinephrine 0.3 mg/0.3 mL injection, auto-injector      estradiol (VIVELLE-DOT) 0.1 MG/24HR patch Place 1 patch on the skin as directed by provider 2 (Two) Times a Week. Please use generic 24 each 3    ferrous sulfate 140 (45 Fe) MG tablet controlled-release tablet Take  by mouth Daily With Breakfast.      fluticasone (FLONASE) 50 MCG/ACT nasal spray 2 sprays into the nostril(s) as directed by provider Daily. 48 g 3    losartan (Cozaar) 100 MG tablet Take 1 tablet by mouth Daily. 90 tablet 1    metFORMIN (GLUCOPHAGE) 500 MG tablet Take 1 tablet by mouth 2 (Two) Times a Day With Meals. 180 tablet 1    naproxen (NAPROSYN) 500 MG tablet Take 1 tablet by mouth 2 (Two) Times a Day As Needed for Moderate Pain (inflammation). (Patient taking differently: Take 1 tablet by mouth As Needed for  "Moderate Pain (inflammation).) 180 tablet 1    NON FORMULARY Inject  under the skin into the appropriate area as directed 1 (One) Time Per Week. Self given      olopatadine (PATADAY) 0.2 % solution ophthalmic solution Administer 1 drop to both eyes Daily. 7.5 mL 3    potassium chloride (K-DUR,KLOR-CON) 20 MEQ CR tablet Take 1 tablet by mouth Daily. with food. 90 tablet 1    rOPINIRole (REQUIP) 2 MG tablet Take 1 tablet by mouth 2 (Two) Times a Day. 180 tablet 3    SUMAtriptan (Imitrex) 20 MG/ACT nasal spray 1 spray into the nostril(s) as directed by provider Every 2 (Two) Hours As Needed for Migraine. (Patient taking differently: 1 spray into the nostril(s) as directed by provider As Needed for Migraine.) 1 each 11    torsemide (DEMADEX) 20 MG tablet Take 1 tablet by mouth Daily. For blood pressure and swelling, replacing HCTZ 25mg 90 tablet 1    loteprednol etabonate (Lotemax) 0.5 % gel ophthalmic gel As Needed.      traZODone (DESYREL) 100 MG tablet Take 1/2 to 1 po qhs prn insomnia (Patient taking differently: As Needed. Take 1/2 to 1 po qhs prn insomnia) 90 tablet 3    [DISCONTINUED] fluconazole (DIFLUCAN) 150 MG tablet 1 po for yeast, may repeat in 3-4 d prn (Patient not taking: Reported on 8/10/2023) 2 tablet 0    [DISCONTINUED] Semaglutide,0.25 or 0.5MG/DOS, (Ozempic, 0.25 or 0.5 MG/DOSE,) 2 MG/1.5ML solution pen-injector 0.25mg SC q week x 4 weeks, then increase to 0.5mg sc qweek 1.5 mL 2     No current facility-administered medications on file prior to visit.        Vital Signs  /86 (BP Location: Right arm, Patient Position: Sitting, Cuff Size: Adult)   Pulse 74   Ht 162.6 cm (64\")   Wt 100 kg (221 lb)   SpO2 98%   BMI 37.93 kg/mý   Estimated body mass index is 37.93 kg/mý as calculated from the following:    Height as of this encounter: 162.6 cm (64\").    Weight as of this encounter: 100 kg (221 lb).    Vitals and nursing note reviewed.   Constitutional:       Appearance: Healthy appearance. Not " in distress.   Eyes:      Conjunctiva/sclera: Conjunctivae normal.   HENT:    Mouth/Throat:      Pharynx: Oropharynx is clear.   Pulmonary:      Effort: Pulmonary effort is normal.   Edema:     Peripheral edema absent.   Musculoskeletal:      Cervical back: Normal range of motion. Skin:     General: Skin is warm and dry.   Neurological:      Mental Status: Alert and oriented to person, place and time.       Results (if applicable):  Lab Results   Component Value Date    CHLPL TNP 02/20/2023    CHLPL 115 02/20/2023    TRIG TNP 02/20/2023    TRIG 123 02/20/2023    HDL TNP 02/20/2023    HDL 37 (L) 02/20/2023    LDL 56 02/20/2023     Lab Results   Component Value Date    GLUCOSE 116 (H) 06/29/2023    CALCIUM 9.4 06/29/2023     06/29/2023    K 4.0 06/29/2023    CO2 23 06/29/2023     06/29/2023    BUN 15 06/29/2023    CREATININE 0.67 06/29/2023    EGFRRESULT 103 06/29/2023    EGFR 99 09/16/2014    BCR 22 06/29/2023    ANIONGAP 12.0 09/12/2019     Lab Results   Component Value Date    WBC 8.2 06/29/2023    HGB 14.0 06/29/2023    HCT 42.5 06/29/2023    MCV 85 06/29/2023     06/29/2023      Lab Results   Component Value Date    HGBA1C 7.0 (H) 02/20/2023       Last Echo (7/13/2023, personally reviewed)      Estimated left ventricular EF = 50%    Left ventricular wall thickness is consistent with mild concentric hypertrophy.    Mild mitral valve regurgitation is present.  Proximal ascending aorta measures 4.1cm        ECG 12 Lead    Date/Time: 8/10/2023 10:43 AM  Performed by: Noe Pablo III, MD  Authorized by: Noe Pablo III, MD   Comparison: compared with previous ECG from 9/10/2019  Similar to previous ECG  Rhythm: sinus rhythm  Rate: normal  QRS axis: normal    Clinical impression: normal ECG            Assessment and Plan     Alia Garza is a 57 y.o. female who presents today for the aforementioned problems.     Diagnoses and all orders for this visit:    1. Dyspnea on exertion (Primary)  -      ECG 12 Lead    2. Lower extremity edema    3. Aortic root dilation    4. Essential hypertension      This is a pleasant 57-year-old female who was referred for further evaluation after an abnormal echocardiogram and lower extremity edema.  From a clinical standpoint her main complaint seems to be shortness of breath with exertion that she associates with weight gain and sleep apnea.  She has no classic angina.  Review of her echocardiogram shows trace to mild tricuspid and mitral regurgitation no other significant valvular or ventricular problems.  She does have a mildly dilated aortic root at 4.1 cm.  Her edema is currently controlled with torsemide 20 mg daily and she should continue on this.  We discussed further investigation of her dyspnea with a stress echocardiogram but she would prefer to defer this for now and see if her symptoms improve with weight loss and further control of her sleep apnea.  Should she reconsider we can always order a stress echocardiogram for her.  She otherwise would need an echocardiogram in 2 years to monitor her aortic root, should this enlarge she will need a CTA or MRA of her aorta.  She will also need control of her blood pressure, today in clinic is excellent.  She continue her current antihypertensive regimen.  She can otherwise follow-up in 1 year or sooner if needed.    Alia Garza  reports that she quit smoking about 33 years ago. Her smoking use included cigarettes. She has a 1.50 pack-year smoking history. She has never used smokeless tobacco.      Follow Up  Return in about 1 year (around 8/10/2024).    Medication Changes: None  Studies ordered: None    Noe Pablo III, MD  Interventional Cardiology   [facility]

## 2023-08-10 ENCOUNTER — OFFICE VISIT (OUTPATIENT)
Dept: CARDIOLOGY | Facility: CLINIC | Age: 58
End: 2023-08-10
Payer: COMMERCIAL

## 2023-08-10 VITALS
SYSTOLIC BLOOD PRESSURE: 128 MMHG | OXYGEN SATURATION: 98 % | BODY MASS INDEX: 37.73 KG/M2 | DIASTOLIC BLOOD PRESSURE: 86 MMHG | WEIGHT: 221 LBS | HEART RATE: 74 BPM | HEIGHT: 64 IN

## 2023-08-10 DIAGNOSIS — R06.09 DYSPNEA ON EXERTION: Primary | ICD-10-CM

## 2023-08-10 DIAGNOSIS — I10 ESSENTIAL HYPERTENSION: ICD-10-CM

## 2023-08-10 DIAGNOSIS — I77.810 AORTIC ROOT DILATION: ICD-10-CM

## 2023-08-10 DIAGNOSIS — R60.0 LOWER EXTREMITY EDEMA: ICD-10-CM

## 2023-08-10 RX ORDER — LOTEPREDNOL ETABONATE 5 MG/G
GEL OPHTHALMIC AS NEEDED
COMMUNITY

## 2023-08-21 ENCOUNTER — OFFICE VISIT (OUTPATIENT)
Dept: FAMILY MEDICINE CLINIC | Facility: CLINIC | Age: 58
End: 2023-08-21
Payer: COMMERCIAL

## 2023-08-21 VITALS
WEIGHT: 222.5 LBS | OXYGEN SATURATION: 97 % | SYSTOLIC BLOOD PRESSURE: 128 MMHG | DIASTOLIC BLOOD PRESSURE: 80 MMHG | BODY MASS INDEX: 37.98 KG/M2 | HEART RATE: 83 BPM | HEIGHT: 64 IN

## 2023-08-21 DIAGNOSIS — F51.04 PSYCHOPHYSIOLOGICAL INSOMNIA: ICD-10-CM

## 2023-08-21 DIAGNOSIS — E87.6 HYPOKALEMIA: ICD-10-CM

## 2023-08-21 DIAGNOSIS — K21.9 GASTROESOPHAGEAL REFLUX DISEASE WITHOUT ESOPHAGITIS: ICD-10-CM

## 2023-08-21 DIAGNOSIS — K76.0 FATTY LIVER: ICD-10-CM

## 2023-08-21 DIAGNOSIS — E78.2 MIXED HYPERLIPIDEMIA: ICD-10-CM

## 2023-08-21 DIAGNOSIS — E11.9 TYPE 2 DIABETES MELLITUS WITHOUT COMPLICATION, WITHOUT LONG-TERM CURRENT USE OF INSULIN: ICD-10-CM

## 2023-08-21 DIAGNOSIS — D50.9 IRON DEFICIENCY ANEMIA, UNSPECIFIED IRON DEFICIENCY ANEMIA TYPE: ICD-10-CM

## 2023-08-21 DIAGNOSIS — R53.83 OTHER FATIGUE: ICD-10-CM

## 2023-08-21 DIAGNOSIS — D51.0 PERNICIOUS ANEMIA: ICD-10-CM

## 2023-08-21 DIAGNOSIS — Z13.1 SCREENING FOR DIABETES MELLITUS: ICD-10-CM

## 2023-08-21 DIAGNOSIS — G47.33 OBSTRUCTIVE SLEEP APNEA SYNDROME: ICD-10-CM

## 2023-08-21 DIAGNOSIS — Z00.01 ENCOUNTER FOR GENERAL ADULT MEDICAL EXAMINATION WITH ABNORMAL FINDINGS: Primary | ICD-10-CM

## 2023-08-21 DIAGNOSIS — I10 BENIGN ESSENTIAL HYPERTENSION: ICD-10-CM

## 2023-08-21 DIAGNOSIS — Z13.220 SCREENING FOR HYPERLIPIDEMIA: ICD-10-CM

## 2023-08-21 DIAGNOSIS — E55.9 VITAMIN D DEFICIENCY: ICD-10-CM

## 2023-08-21 DIAGNOSIS — G25.81 RESTLESS LEGS: ICD-10-CM

## 2023-08-21 DIAGNOSIS — Z79.899 ENCOUNTER FOR LONG-TERM (CURRENT) USE OF OTHER MEDICATIONS: ICD-10-CM

## 2023-08-21 PROCEDURE — 90677 PCV20 VACCINE IM: CPT | Performed by: FAMILY MEDICINE

## 2023-08-21 PROCEDURE — 99396 PREV VISIT EST AGE 40-64: CPT | Performed by: FAMILY MEDICINE

## 2023-08-21 PROCEDURE — 90471 IMMUNIZATION ADMIN: CPT | Performed by: FAMILY MEDICINE

## 2023-08-21 PROCEDURE — 90746 HEPB VACCINE 3 DOSE ADULT IM: CPT | Performed by: FAMILY MEDICINE

## 2023-08-21 PROCEDURE — 90472 IMMUNIZATION ADMIN EACH ADD: CPT | Performed by: FAMILY MEDICINE

## 2023-08-21 RX ORDER — ROPINIROLE 2 MG/1
2 TABLET, FILM COATED ORAL 2 TIMES DAILY
Qty: 180 TABLET | Refills: 3 | Status: SHIPPED | OUTPATIENT
Start: 2023-08-21

## 2023-08-21 RX ORDER — ATORVASTATIN CALCIUM 20 MG/1
20 TABLET, FILM COATED ORAL DAILY
Qty: 90 TABLET | Refills: 3 | Status: SHIPPED | OUTPATIENT
Start: 2023-08-21

## 2023-08-21 RX ORDER — NAPROXEN 500 MG/1
500 TABLET ORAL 2 TIMES DAILY PRN
Qty: 180 TABLET | Refills: 1 | Status: SHIPPED | OUTPATIENT
Start: 2023-08-21

## 2023-08-21 RX ORDER — CYCLOBENZAPRINE HCL 10 MG
10 TABLET ORAL 2 TIMES DAILY PRN
Qty: 180 TABLET | Refills: 1 | Status: SHIPPED | OUTPATIENT
Start: 2023-08-21

## 2023-08-21 RX ORDER — OLOPATADINE HYDROCHLORIDE 2 MG/ML
1 SOLUTION/ DROPS OPHTHALMIC DAILY
Qty: 7.5 ML | Refills: 3 | Status: SHIPPED | OUTPATIENT
Start: 2023-08-21

## 2023-08-21 RX ORDER — FLUTICASONE PROPIONATE 50 MCG
2 SPRAY, SUSPENSION (ML) NASAL DAILY
Qty: 48 G | Refills: 3 | Status: SHIPPED | OUTPATIENT
Start: 2023-08-21

## 2023-08-21 RX ORDER — CETIRIZINE HYDROCHLORIDE 10 MG/1
10 TABLET ORAL DAILY
Qty: 90 TABLET | Refills: 3 | Status: SHIPPED | OUTPATIENT
Start: 2023-08-21

## 2023-08-21 RX ORDER — SUMATRIPTAN 20 MG/1
1 SPRAY NASAL
Qty: 1 EACH | Refills: 11 | Status: SHIPPED | OUTPATIENT
Start: 2023-08-21

## 2023-08-21 RX ORDER — POTASSIUM CHLORIDE 20 MEQ/1
20 TABLET, EXTENDED RELEASE ORAL DAILY
Qty: 90 TABLET | Refills: 1 | Status: SHIPPED | OUTPATIENT
Start: 2023-08-21

## 2023-08-21 RX ORDER — TRAZODONE HYDROCHLORIDE 100 MG/1
TABLET ORAL
Qty: 90 TABLET | Refills: 3 | Status: SHIPPED | OUTPATIENT
Start: 2023-08-21

## 2023-08-21 NOTE — PROGRESS NOTES
Chief Complaint  Annual Exam    Subjective      Alia Garza presents to [facility]  History of Present Illness  Patient here for annual exam.  She has had a very stressful couple of months as her  has been in and out of the hospital couple times, and her son was recently admitted for emergency appendectomy.  The patient did have cardiac evaluation and cardiology advised her that they did not think stress testing was needed.  She also has been to GI about her upper abdominal pain and is scheduled to have an EGD next month, although there is still some suspicion about underlying gallbladder pathology.  The patient has not been able to lose weight in spite of making lifestyle modification, and we tried to get a prescription for a GLP-1 agent covered by her insurance since she has type 2 diabetes, but her pharmacy benefit manager refused to cover any of those because her diabetes is adequately controlled.  I told the patient that unfortunately she is going to have to get sicker to get the treatment she needs.  We did discuss the possibility of trying Contrave, side effects and risk discussed, and the likelihood that her insurance will also refuse coverage for this but the fact that there is a $99 a month we will reprogram if she decides she wants to try it, so she is going to look into that let me know.    The patient did start using CPAP a couple months ago, but unfortunately it has not helped her energy and she still gets sleepy during the day.  I discussed the option of seeing a sleep medicine specialist so she can have a formal sleep lab done in a hospital or in an outpatient sleep lab to see if there is something else causing impairment of sleep, but she wants to give it a couple of months to recover from the stress she has been under lately.  Objective   Vital Signs:   Vitals:    08/21/23 1410   BP: 128/80   BP Location: Left arm   Patient Position: Sitting   Cuff Size:  "Adult   Pulse: 83   SpO2: 97%   Weight: 101 kg (222 lb 8 oz)   Height: 162.6 cm (64\")      /80 (BP Location: Left arm, Patient Position: Sitting, Cuff Size: Adult)   Pulse 83   Ht 162.6 cm (64\")   Wt 101 kg (222 lb 8 oz)   SpO2 97%   BMI 38.19 kg/mý     Body mass index is 38.19 kg/mý.    Review of Systems   Constitutional:  Positive for fatigue. Negative for activity change, appetite change, chills, fever and unexpected weight loss.   HENT:  Negative for ear discharge, ear pain, mouth sores, nosebleeds, rhinorrhea, sinus pressure, sore throat, swollen glands, trouble swallowing and voice change.    Eyes:  Negative for blurred vision, double vision, pain, redness and visual disturbance.   Respiratory:  Negative for cough, shortness of breath and wheezing.    Cardiovascular:  Negative for chest pain, palpitations and leg swelling.        PND, orthopnea   Gastrointestinal:  Negative for abdominal distention, abdominal pain, blood in stool, constipation, diarrhea, nausea, vomiting and GERD.        Dysphagia, odynophagia   Endocrine: Negative for polydipsia, polyphagia and polyuria.   Genitourinary:  Negative for difficulty urinating, dysuria, frequency, hematuria and urinary incontinence.   Musculoskeletal:  Negative for arthralgias (unusual/atypica), back pain, gait problem, joint swelling and myalgias.   Skin:  Negative for rash, skin lesions (worrisome/suspicious), wound and bruise.   Allergic/Immunologic: Positive for environmental allergies. Negative for food allergies.   Neurological:  Positive for headache. Negative for dizziness, tremors, seizures, syncope, weakness, light-headedness, numbness and memory problem.   Hematological:  Negative for adenopathy. Does not bruise/bleed easily.   Psychiatric/Behavioral:  Negative for suicidal ideas and depressed mood. The patient is not nervous/anxious.      Past History:  Medical History: has a past medical history of Abnormal Pap smear of cervix, Anemia, " Angioedema (2008), AR (allergic rhinitis), Benign essential HTN, Carpal tunnel syndrome, Chronic fatigue syndrome, DM type 2 with diabetic dyslipidemia, Dyspareunia, female, Endometriosis, Fatty liver (2022), GERD (gastroesophageal reflux disease), Headache, High risk medication use, Hyperglycemia, Hyperlipidemia, Hypertension, Iron deficiency anemia, Kidney stone (07/2022), Low iron, Lung nodule seen on imaging study (07/2022), Migraine, Obesity, Ovarian cyst (1995), Pernicious anemia, Polycystic disease, ovaries, Prediabetes, Pregnancy (2001), Recurrent UTI, RLS (restless legs syndrome), Sleep apnea, and Type 2 diabetes mellitus without complication.   Surgical History: has a past surgical history that includes Tonsillectomy; Colonoscopy (2018); Bladder surgery; Exploratory laparotomy; Carpal tunnel release (Right); LASIK (2015); total laparoscopic hysterectomy salpingo oophorectomy (N/A, 09/11/2019); Oophorectomy; Urethral dilation; Uvulectomy; Total abdominal hysterectomy w/ bilateral salpingoophorectomy (09/2019); and Laparoscopy.   Family History: family history includes ADD / ADHD in her son; Alcohol abuse in her maternal grandfather; Breast cancer (age of onset: 39) in her maternal aunt; Breast cancer (age of onset: 92) in her maternal grandmother; Diabetes in some other family members; Heart attack in her father; Hyperlipidemia in her father; Hypertension in her father; Migraines in her mother; Prostate cancer in her paternal grandfather; Thyroid disease in her mother.   Social History: reports that she quit smoking about 33 years ago. Her smoking use included cigarettes. She has a 1.50 pack-year smoking history. She has never used smokeless tobacco. She reports that she does not drink alcohol and does not use drugs.      Current Outpatient Medications:     Aspirin Buf,CaCarb-MgCarb-MgO, 81 MG tablet, Take 1 tablet by mouth., Disp: , Rfl:     atorvastatin (LIPITOR) 20 MG tablet, Take 1 tablet by mouth  Daily., Disp: 90 tablet, Rfl: 3    cetirizine (zyrTEC) 10 MG tablet, Take 1 tablet by mouth Daily., Disp: 90 tablet, Rfl: 3    cyclobenzaprine (FLEXERIL) 10 MG tablet, Take 1 tablet by mouth 2 (Two) Times a Day As Needed for Muscle Spasms., Disp: 180 tablet, Rfl: 1    cycloSPORINE (RESTASIS) 0.05 % ophthalmic emulsion, Administer 1 drop to both eyes 2 (Two) Times a Day., Disp: , Rfl:     EPINEPHrine (EPIPEN) 0.3 MG/0.3ML solution auto-injector injection, epinephrine 0.3 mg/0.3 mL injection, auto-injector, Disp: , Rfl:     estradiol (VIVELLE-DOT) 0.1 MG/24HR patch, Place 1 patch on the skin as directed by provider 2 (Two) Times a Week. Please use generic, Disp: 24 each, Rfl: 3    ferrous sulfate 140 (45 Fe) MG tablet controlled-release tablet, Take  by mouth Daily With Breakfast., Disp: , Rfl:     fluticasone (FLONASE) 50 MCG/ACT nasal spray, 2 sprays into the nostril(s) as directed by provider Daily., Disp: 48 g, Rfl: 3    losartan (Cozaar) 100 MG tablet, Take 1 tablet by mouth Daily., Disp: 90 tablet, Rfl: 1    loteprednol etabonate (LOTEMAX) 0.5 % gel ophthalmic gel, As Needed., Disp: , Rfl:     metFORMIN (GLUCOPHAGE) 500 MG tablet, Take 1 tablet by mouth 2 (Two) Times a Day With Meals., Disp: 180 tablet, Rfl: 1    naproxen (NAPROSYN) 500 MG tablet, Take 1 tablet by mouth 2 (Two) Times a Day As Needed for Moderate Pain (inflammation)., Disp: 180 tablet, Rfl: 1    olopatadine (PATADAY) 0.2 % solution ophthalmic solution, Administer 1 drop to both eyes Daily., Disp: 7.5 mL, Rfl: 3    potassium chloride (K-DUR,KLOR-CON) 20 MEQ CR tablet, Take 1 tablet by mouth Daily. with food., Disp: 90 tablet, Rfl: 1    rOPINIRole (REQUIP) 2 MG tablet, Take 1 tablet by mouth 2 (Two) Times a Day., Disp: 180 tablet, Rfl: 3    SUMAtriptan (Imitrex) 20 MG/ACT nasal spray, 1 spray into the nostril(s) as directed by provider Every 2 (Two) Hours As Needed for Migraine., Disp: 1 each, Rfl: 11    torsemide (DEMADEX) 20 MG tablet, Take 1  tablet by mouth Daily. For blood pressure and swelling, replacing HCTZ 25mg, Disp: 90 tablet, Rfl: 1    traZODone (DESYREL) 100 MG tablet, Take 1/2 to 1 po qhs prn insomnia, Disp: 90 tablet, Rfl: 3    NON FORMULARY, Inject  under the skin into the appropriate area as directed 1 (One) Time Per Week. Self given, Disp: , Rfl:     Allergies: Lisinopril and Ace inhibitors    Physical Exam  Constitutional:       General: She is not in acute distress.     Appearance: She is obese. She is not toxic-appearing.   HENT:      Head: Normocephalic and atraumatic.      Right Ear: Ear canal and external ear normal.      Left Ear: Ear canal and external ear normal.      Nose: Nose normal.      Mouth/Throat:      Mouth: Mucous membranes are moist.      Pharynx: Oropharynx is clear.   Eyes:      General: No scleral icterus.     Extraocular Movements: Extraocular movements intact.      Conjunctiva/sclera: Conjunctivae normal.      Pupils: Pupils are equal, round, and reactive to light.   Neck:      Vascular: No carotid bruit.   Cardiovascular:      Rate and Rhythm: Normal rate and regular rhythm.      Pulses: Normal pulses.           Dorsalis pedis pulses are 2+ on the right side and 2+ on the left side.        Posterior tibial pulses are 2+ on the right side and 2+ on the left side.      Heart sounds: Normal heart sounds.   Pulmonary:      Effort: Pulmonary effort is normal.      Breath sounds: Normal breath sounds.   Chest:      Chest wall: No tenderness.   Abdominal:      General: Bowel sounds are normal. There is no distension.      Palpations: Abdomen is soft. There is no mass.      Tenderness: There is no abdominal tenderness. There is no guarding or rebound.   Musculoskeletal:         General: No swelling or deformity. Normal range of motion.      Cervical back: Normal range of motion. No rigidity.      Right lower leg: No edema.      Left lower leg: No edema.      Right foot: No deformity.      Left foot: No deformity.   Feet:       Right foot:      Protective Sensation: 5 sites tested.  5 sites sensed.      Skin integrity: Skin integrity normal.      Left foot:      Protective Sensation: 5 sites tested.  5 sites sensed.      Skin integrity: Skin integrity normal.      Comments:      Lymphadenopathy:      Cervical: No cervical adenopathy.   Skin:     General: Skin is warm and dry.      Capillary Refill: Capillary refill takes less than 2 seconds.   Neurological:      General: No focal deficit present.      Mental Status: She is alert and oriented to person, place, and time.      Cranial Nerves: No cranial nerve deficit.      Sensory: No sensory deficit.      Motor: No weakness.      Coordination: Coordination normal.      Gait: Gait normal.   Psychiatric:         Mood and Affect: Mood normal.         Behavior: Behavior normal.         Thought Content: Thought content normal.         Judgment: Judgment normal.                 Assessment and Plan   Diagnoses and all orders for this visit:    1. Encounter for general adult medical examination with abnormal findings (Primary)  -     CBC & Differential  -     Comprehensive Metabolic Panel  Healthy lifestyle measures including healthy diet regular exercise and weight reduction were discussed.  Preventive healthcare measures were also discussed.  Patient will get Prevnar 20 and hepatitis B vaccine #1 today, and advised to get the full hepatitis B vaccine series.  We will check labs and she will continue current medications.  If her fatigue does not improve over the next couple months and she continues to have daytime drowsiness, then she will let me know so that I can refer to a sleep medicine specialist  2. Screening for diabetes mellitus  -     Hemoglobin A1c    3. Screening for hyperlipidemia  -     Lipid Panel    4. Other fatigue  -     TSH+Free T4    5. Fatty liver    6. Pernicious anemia  -     Vitamin B12  -     Folate    7. Benign essential hypertension    8. Iron deficiency anemia,  unspecified iron deficiency anemia type  -     Ferritin  -     Iron  This problem has been better when checked over the last couple years but she has had recurrent iron deficiency anemia in the past, so we will check her iron studies with regular labs today  9. Type 2 diabetes mellitus without complication, without long-term current use of insulin  Well-controlled with metformin 500 mg twice a day and she will continue  10. Encounter for long-term (current) use of other medications  -     Magnesium    11. Mixed hyperlipidemia    12. Gastroesophageal reflux disease without esophagitis    13. Hypokalemia    14. Psychophysiological insomnia    15. Obstructive sleep apnea syndrome  Patient is using her CPAP and benefits from this physically even though she does not feel like her energy levels any better, that is likely multifactorial, so I stressed that she still has a lot of health benefits by sticking with the CPAP and she will continue to use it  16. Restless legs    17. Vitamin D deficiency  -     Vitamin D,25-Hydroxy    Other orders  -     Pneumococcal Conjugate Vaccine 20-Valent (PCV20)  -     Hepatitis B Vaccine Adult IM (ENERGIX/RECOMBIVAX)  -     atorvastatin (LIPITOR) 20 MG tablet; Take 1 tablet by mouth Daily.  Dispense: 90 tablet; Refill: 3  -     cyclobenzaprine (FLEXERIL) 10 MG tablet; Take 1 tablet by mouth 2 (Two) Times a Day As Needed for Muscle Spasms.  Dispense: 180 tablet; Refill: 1  -     fluticasone (FLONASE) 50 MCG/ACT nasal spray; 2 sprays into the nostril(s) as directed by provider Daily.  Dispense: 48 g; Refill: 3  -     metFORMIN (GLUCOPHAGE) 500 MG tablet; Take 1 tablet by mouth 2 (Two) Times a Day With Meals.  Dispense: 180 tablet; Refill: 1  -     potassium chloride (K-DUR,KLOR-CON) 20 MEQ CR tablet; Take 1 tablet by mouth Daily. with food.  Dispense: 90 tablet; Refill: 1  -     rOPINIRole (REQUIP) 2 MG tablet; Take 1 tablet by mouth 2 (Two) Times a Day.  Dispense: 180 tablet; Refill: 3  -      SUMAtriptan (Imitrex) 20 MG/ACT nasal spray; 1 spray into the nostril(s) as directed by provider Every 2 (Two) Hours As Needed for Migraine.  Dispense: 1 each; Refill: 11  -     traZODone (DESYREL) 100 MG tablet; Take 1/2 to 1 po qhs prn insomnia  Dispense: 90 tablet; Refill: 3  -     olopatadine (PATADAY) 0.2 % solution ophthalmic solution; Administer 1 drop to both eyes Daily.  Dispense: 7.5 mL; Refill: 3  -     naproxen (NAPROSYN) 500 MG tablet; Take 1 tablet by mouth 2 (Two) Times a Day As Needed for Moderate Pain (inflammation).  Dispense: 180 tablet; Refill: 1  -     cetirizine (zyrTEC) 10 MG tablet; Take 1 tablet by mouth Daily.  Dispense: 90 tablet; Refill: 3            Follow Up   No follow-ups on file.  Patient was given instructions and counseling regarding her condition or for health maintenance advice. Please see specific information pulled into the AVS if appropriate.     Jhon Edmond MD

## 2023-08-22 DIAGNOSIS — E11.9 TYPE 2 DIABETES MELLITUS WITHOUT COMPLICATION, WITHOUT LONG-TERM CURRENT USE OF INSULIN: Primary | ICD-10-CM

## 2023-08-22 LAB
25(OH)D3+25(OH)D2 SERPL-MCNC: 34.7 NG/ML (ref 30–100)
ALBUMIN SERPL-MCNC: 4.2 G/DL (ref 3.8–4.9)
ALBUMIN/GLOB SERPL: 1.6 {RATIO} (ref 1.2–2.2)
ALP SERPL-CCNC: 143 IU/L (ref 44–121)
ALT SERPL-CCNC: 32 IU/L (ref 0–32)
AST SERPL-CCNC: 22 IU/L (ref 0–40)
BASOPHILS # BLD AUTO: 0 X10E3/UL (ref 0–0.2)
BASOPHILS NFR BLD AUTO: 0 %
BILIRUB SERPL-MCNC: 0.3 MG/DL (ref 0–1.2)
BUN SERPL-MCNC: 18 MG/DL (ref 6–24)
BUN/CREAT SERPL: 23 (ref 9–23)
CALCIUM SERPL-MCNC: 9.2 MG/DL (ref 8.7–10.2)
CHLORIDE SERPL-SCNC: 103 MMOL/L (ref 96–106)
CHOLEST SERPL-MCNC: 124 MG/DL (ref 100–199)
CO2 SERPL-SCNC: 24 MMOL/L (ref 20–29)
CREAT SERPL-MCNC: 0.77 MG/DL (ref 0.57–1)
EGFRCR SERPLBLD CKD-EPI 2021: 91 ML/MIN/1.73
EOSINOPHIL # BLD AUTO: 0.1 X10E3/UL (ref 0–0.4)
EOSINOPHIL NFR BLD AUTO: 2 %
ERYTHROCYTE [DISTWIDTH] IN BLOOD BY AUTOMATED COUNT: 13.7 % (ref 11.7–15.4)
FERRITIN SERPL-MCNC: 104 NG/ML (ref 15–150)
FOLATE SERPL-MCNC: 7 NG/ML
GLOBULIN SER CALC-MCNC: 2.6 G/DL (ref 1.5–4.5)
GLUCOSE SERPL-MCNC: 174 MG/DL (ref 70–99)
HBA1C MFR BLD: 7.3 % (ref 4.8–5.6)
HCT VFR BLD AUTO: 41.3 % (ref 34–46.6)
HDLC SERPL-MCNC: 40 MG/DL
HGB BLD-MCNC: 13.7 G/DL (ref 11.1–15.9)
IMM GRANULOCYTES # BLD AUTO: 0.1 X10E3/UL (ref 0–0.1)
IMM GRANULOCYTES NFR BLD AUTO: 1 %
IRON SERPL-MCNC: 54 UG/DL (ref 27–159)
LDLC SERPL CALC-MCNC: 54 MG/DL (ref 0–99)
LYMPHOCYTES # BLD AUTO: 1.6 X10E3/UL (ref 0.7–3.1)
LYMPHOCYTES NFR BLD AUTO: 23 %
MAGNESIUM SERPL-MCNC: 1.9 MG/DL (ref 1.6–2.3)
MCH RBC QN AUTO: 28.4 PG (ref 26.6–33)
MCHC RBC AUTO-ENTMCNC: 33.2 G/DL (ref 31.5–35.7)
MCV RBC AUTO: 86 FL (ref 79–97)
MONOCYTES # BLD AUTO: 0.6 X10E3/UL (ref 0.1–0.9)
MONOCYTES NFR BLD AUTO: 8 %
NEUTROPHILS # BLD AUTO: 4.8 X10E3/UL (ref 1.4–7)
NEUTROPHILS NFR BLD AUTO: 66 %
PLATELET # BLD AUTO: 236 X10E3/UL (ref 150–450)
POTASSIUM SERPL-SCNC: 3.6 MMOL/L (ref 3.5–5.2)
PROT SERPL-MCNC: 6.8 G/DL (ref 6–8.5)
RBC # BLD AUTO: 4.82 X10E6/UL (ref 3.77–5.28)
SODIUM SERPL-SCNC: 143 MMOL/L (ref 134–144)
T4 FREE SERPL-MCNC: 1.15 NG/DL (ref 0.82–1.77)
TRIGL SERPL-MCNC: 179 MG/DL (ref 0–149)
TSH SERPL DL<=0.005 MIU/L-ACNC: 1.2 UIU/ML (ref 0.45–4.5)
VIT B12 SERPL-MCNC: 368 PG/ML (ref 232–1245)
VLDLC SERPL CALC-MCNC: 30 MG/DL (ref 5–40)
WBC # BLD AUTO: 7.2 X10E3/UL (ref 3.4–10.8)

## 2023-08-22 RX ORDER — METFORMIN HYDROCHLORIDE 500 MG/1
1000 TABLET, EXTENDED RELEASE ORAL 2 TIMES DAILY WITH MEALS
Qty: 360 TABLET | Refills: 3 | Status: SHIPPED | OUTPATIENT
Start: 2023-08-22

## 2023-09-14 ENCOUNTER — OUTSIDE FACILITY SERVICE (OUTPATIENT)
Dept: GASTROENTEROLOGY | Facility: CLINIC | Age: 58
End: 2023-09-14
Payer: COMMERCIAL

## 2023-09-14 PROCEDURE — 43239 EGD BIOPSY SINGLE/MULTIPLE: CPT | Performed by: INTERNAL MEDICINE

## 2023-09-14 PROCEDURE — 88305 TISSUE EXAM BY PATHOLOGIST: CPT

## 2023-09-14 PROCEDURE — 88342 IMHCHEM/IMCYTCHM 1ST ANTB: CPT

## 2023-09-15 ENCOUNTER — LAB REQUISITION (OUTPATIENT)
Dept: LAB | Facility: HOSPITAL | Age: 58
End: 2023-09-15
Payer: COMMERCIAL

## 2023-09-15 DIAGNOSIS — K29.70 GASTRITIS, UNSPECIFIED, WITHOUT BLEEDING: ICD-10-CM

## 2023-09-15 DIAGNOSIS — R10.13 EPIGASTRIC PAIN: ICD-10-CM

## 2023-09-15 DIAGNOSIS — R14.0 ABDOMINAL DISTENSION (GASEOUS): ICD-10-CM

## 2023-09-15 DIAGNOSIS — K31.89 OTHER DISEASES OF STOMACH AND DUODENUM: ICD-10-CM

## 2023-09-15 DIAGNOSIS — K21.00 GASTRO-ESOPHAGEAL REFLUX DISEASE WITH ESOPHAGITIS, WITHOUT BLEEDING: ICD-10-CM

## 2023-09-15 DIAGNOSIS — R10.10 UPPER ABDOMINAL PAIN, UNSPECIFIED: ICD-10-CM

## 2023-09-15 DIAGNOSIS — K76.6 PORTAL HYPERTENSION: ICD-10-CM

## 2023-09-15 DIAGNOSIS — R11.0 NAUSEA: ICD-10-CM

## 2023-09-19 ENCOUNTER — TELEPHONE (OUTPATIENT)
Dept: GASTROENTEROLOGY | Facility: CLINIC | Age: 58
End: 2023-09-19
Payer: COMMERCIAL

## 2023-09-19 DIAGNOSIS — R74.8 ELEVATED ALKALINE PHOSPHATASE LEVEL: ICD-10-CM

## 2023-09-19 DIAGNOSIS — R10.13 DYSPEPSIA: Primary | ICD-10-CM

## 2023-09-19 DIAGNOSIS — R10.10 UPPER ABDOMINAL PAIN: ICD-10-CM

## 2023-09-19 DIAGNOSIS — R11.0 NAUSEA: ICD-10-CM

## 2023-09-19 DIAGNOSIS — R53.81 MALAISE AND FATIGUE: ICD-10-CM

## 2023-09-19 DIAGNOSIS — R53.83 MALAISE AND FATIGUE: ICD-10-CM

## 2023-09-19 LAB — REF LAB TEST METHOD: NORMAL

## 2023-09-19 NOTE — TELEPHONE ENCOUNTER
I called and spoke to Ms. Garza about the pathology.  There was some evidence of reflux but no evidence of H. pylori gastritis or celiac disease.  I will proceed with ordering a HIDA scan with [facility].  I also discussed checking an antimitochondrial antibody given the chronic slight elevation of the alkaline phosphatase.

## 2023-10-18 ENCOUNTER — TELEPHONE (OUTPATIENT)
Dept: FAMILY MEDICINE CLINIC | Facility: CLINIC | Age: 58
End: 2023-10-18

## 2023-10-18 NOTE — TELEPHONE ENCOUNTER
Caller: FAUZIA    Relationship: [facility]     Best call back number [phone] EXT. [phone]    What form or medical record are you requesting: VISIT NOTES FROM 8/24/23     Who is requesting this form or medical record from you: [facility]    How would you like to receive the form or medical records (pick-up, mail, fax): FAX  If fax, what is the fax number:  [phone]      Timeframe paperwork needed: ASAP

## 2023-10-19 NOTE — TELEPHONE ENCOUNTER
Caller: FAUZIA     Relationship: [facility]      Best call back number [phone] EXT. [phone]     What form or medical record are you requesting: VISIT NOTES FROM 8/24/23      Who is requesting this form or medical record from you: [facility]     How would you like to receive the form or medical records (pick-up, mail, fax): FAX  If fax, what is the fax number:  [phone]        Timeframe paperwork needed: ASAP     Faxed office note from 08/21/2023 to [facility] as requested

## 2023-10-23 DIAGNOSIS — N95.1 MENOPAUSAL SYMPTOMS: ICD-10-CM

## 2023-10-23 RX ORDER — ESTRADIOL 0.1 MG/D
FILM, EXTENDED RELEASE TRANSDERMAL
Qty: 24 PATCH | Refills: 0 | OUTPATIENT
Start: 2023-10-23

## 2023-10-24 RX ORDER — OLOPATADINE HYDROCHLORIDE 2 MG/ML
SOLUTION/ DROPS OPHTHALMIC
Qty: 9 ML | Refills: 0 | OUTPATIENT
Start: 2023-10-24

## 2023-10-25 ENCOUNTER — TELEPHONE (OUTPATIENT)
Dept: FAMILY MEDICINE CLINIC | Facility: CLINIC | Age: 58
End: 2023-10-25

## 2023-10-25 NOTE — TELEPHONE ENCOUNTER
Caller: LORI HINTON    Relationship:     Best call back number:  [phone]    EXT [phone]     What is the best time to reach you: ANY    Who are you requesting to speak with (clinical staff, provider,  specific staff member): CLINICAL    What was the call regarding:     HAS PATIENT HAD CPAP FOLLOW UP ON OR AFTER JUNE 30

## 2023-10-31 ENCOUNTER — TELEPHONE (OUTPATIENT)
Dept: FAMILY MEDICINE CLINIC | Facility: CLINIC | Age: 58
End: 2023-10-31

## 2023-10-31 NOTE — TELEPHONE ENCOUNTER
SAMIRA FROM [facility] CALLED AND NEEDS THIS PATIENT'S OFFICE NOTE FROM 06/29/23 AND IF 08/21/23 INCLUDES INFORMATION ABOUT THE   C PAP PLEASE SEND IT TOO    FAX NUMBER  ALLAN HOGAN    PHONE  [phone]

## 2023-11-07 NOTE — TELEPHONE ENCOUNTER
Caller: FAUZIA TASHA ESPINAL    Best call back number:     [phone]   EXT: [phone]    What was the call regarding:   FAUZIA WITH [employer] IS NEEDING PATIENT'S OFFICE VISIT NOTES FROM 06/29/2023 AND 08/21/2023 ALONG WITH ANY OFFICE VISIT NOTES INCLUDING INFORMATION ABOUT PATIENT'S CPAP TO BE FAXED TO NUMBER BELOW AS SOON AS POSSIBLE     FAX: [phone]

## 2023-11-13 ENCOUNTER — TRANSCRIBE ORDERS (OUTPATIENT)
Dept: ADMINISTRATIVE | Facility: HOSPITAL | Age: 58
End: 2023-11-13
Payer: COMMERCIAL

## 2023-11-13 DIAGNOSIS — Z12.31 SCREENING MAMMOGRAM, ENCOUNTER FOR: Primary | ICD-10-CM

## 2023-11-13 DIAGNOSIS — N95.1 MENOPAUSAL SYMPTOMS: ICD-10-CM

## 2023-11-13 NOTE — TELEPHONE ENCOUNTER
Caller: Alia Garza    Relationship: Self    Best call back number: [phone]    Requested Prescriptions:   Requested Prescriptions     Pending Prescriptions Disp Refills    estradiol (VIVELLE-DOT) 0.1 MG/24HR patch 24 each 3     Sig: Place 1 patch on the skin as directed by provider 2 (Two) Times a Week. Please use generic       Pharmacy where request should be sent: [address] [phone] [facility] [phone]      Last office visit with prescribing clinician: 11/10/2022   Last telemedicine visit with prescribing clinician: Visit date not found   Next office visit with prescribing clinician: 12/28/2023     Does the patient have less than a 3 day supply:  [] Yes  [x] No    Would you like a call back once the refill request has been completed: [] Yes [x] No    If the office needs to give you a call back, can they leave a voicemail: [] Yes [x] No    Veda Durán Rep   11/13/23 11:07 EST

## 2023-11-14 RX ORDER — ESTRADIOL 0.1 MG/D
1 FILM, EXTENDED RELEASE TRANSDERMAL 2 TIMES WEEKLY
Qty: 24 EACH | Refills: 0 | Status: SHIPPED | OUTPATIENT
Start: 2023-11-16

## 2023-11-22 ENCOUNTER — TELEPHONE (OUTPATIENT)
Dept: OBSTETRICS AND GYNECOLOGY | Facility: CLINIC | Age: 58
End: 2023-11-22
Payer: COMMERCIAL

## 2023-11-22 NOTE — TELEPHONE ENCOUNTER
Patient's wife, Allyson returning phone call.  Please advise.     Pt is calling for a refill of her hormone patches

## 2023-12-06 ENCOUNTER — HOSPITAL ENCOUNTER (OUTPATIENT)
Dept: NUCLEAR MEDICINE | Facility: HOSPITAL | Age: 58
Discharge: HOME | End: 2023-12-06
Payer: COMMERCIAL

## 2023-12-06 DIAGNOSIS — R10.13 DYSPEPSIA: ICD-10-CM

## 2023-12-06 DIAGNOSIS — R11.0 NAUSEA: ICD-10-CM

## 2023-12-06 DIAGNOSIS — R10.10 UPPER ABDOMINAL PAIN: ICD-10-CM

## 2023-12-06 PROCEDURE — 78227 HEPATOBIL SYST IMAGE W/DRUG: CPT

## 2023-12-06 PROCEDURE — A9537 TC99M MEBROFENIN: HCPCS | Performed by: INTERNAL MEDICINE

## 2023-12-06 PROCEDURE — 0 TECHNETIUM TC 99M MEBROFENIN KIT: Performed by: INTERNAL MEDICINE

## 2023-12-06 PROCEDURE — 25010000002 SINCALIDE PER 5 MCG: Performed by: INTERNAL MEDICINE

## 2023-12-06 RX ORDER — SINCALIDE 5 UG/5ML
0.02 INJECTION, POWDER, LYOPHILIZED, FOR SOLUTION INTRAVENOUS ONCE
Qty: 5 ML | Refills: 0 | Status: COMPLETED | OUTPATIENT
Start: 2023-12-06 | End: 2023-12-06

## 2023-12-06 RX ORDER — KIT FOR THE PREPARATION OF TECHNETIUM TC 99M MEBROFENIN 45 MG/10ML
1 INJECTION, POWDER, LYOPHILIZED, FOR SOLUTION INTRAVENOUS
Status: COMPLETED | OUTPATIENT
Start: 2023-12-06 | End: 2023-12-06

## 2023-12-06 RX ADMIN — SINCALIDE 2 MCG: 5 INJECTION, POWDER, LYOPHILIZED, FOR SOLUTION INTRAVENOUS at 14:39

## 2023-12-06 RX ADMIN — MEBROFENIN 1 DOSE: 45 INJECTION, POWDER, LYOPHILIZED, FOR SOLUTION INTRAVENOUS at 13:31

## 2023-12-19 RX ORDER — LOSARTAN POTASSIUM 100 MG/1
100 TABLET ORAL DAILY
Qty: 90 TABLET | Refills: 0 | OUTPATIENT
Start: 2023-12-19

## 2023-12-28 ENCOUNTER — OFFICE VISIT (OUTPATIENT)
Dept: OBSTETRICS AND GYNECOLOGY | Facility: CLINIC | Age: 58
End: 2023-12-28
Payer: COMMERCIAL

## 2023-12-28 VITALS — DIASTOLIC BLOOD PRESSURE: 84 MMHG | SYSTOLIC BLOOD PRESSURE: 130 MMHG | BODY MASS INDEX: 37.76 KG/M2 | WEIGHT: 220 LBS

## 2023-12-28 DIAGNOSIS — Z01.419 ROUTINE GYNECOLOGICAL EXAMINATION: ICD-10-CM

## 2023-12-28 DIAGNOSIS — Z12.31 ENCOUNTER FOR SCREENING MAMMOGRAM FOR MALIGNANT NEOPLASM OF BREAST: ICD-10-CM

## 2023-12-28 DIAGNOSIS — Z01.419 PAP TEST, AS PART OF ROUTINE GYNECOLOGICAL EXAMINATION: Primary | ICD-10-CM

## 2023-12-28 DIAGNOSIS — N95.1 MENOPAUSAL SYMPTOMS: ICD-10-CM

## 2023-12-28 RX ORDER — ESTRADIOL 0.1 MG/D
1 FILM, EXTENDED RELEASE TRANSDERMAL 2 TIMES WEEKLY
Qty: 24 EACH | Refills: 3 | Status: SHIPPED | OUTPATIENT
Start: 2023-12-28

## 2023-12-28 NOTE — PROGRESS NOTES
Gynecologic Annual Exam Note        GYN Annual Exam     CC - Here for annual exam.        HPI  Alia Garza is a 58 y.o. female, , who presents for annual well woman exam as a established patient.  She is s/p TLH/BSO in 2019 for endometriosis .. Denies vaginal bleeding.  Patient reports problems with:  anxiety . There were no changes to her medical or surgical history since her last visit..     Partner Status: Marital Status: .  She is is sexually active. She has not had new partners.. STD testing recommendations have been explained to the patient and she does not desire STD testing.    Additional OB/GYN History   On HRT? Yes. Details: patch    Last Pap : 11/10/22. Results: negative. HPV: not done.   Last Completed Pap Smear       This patient has no relevant Health Maintenance data.          History of abnormal Pap smear: yes - repeat neg  Family history of uterine, colon, breast, or ovarian cancer: yes - MGM and maternal aunt with breast  Performs monthly Self-Breast Exam: yes  Last mammogram: 10/31/22. Done at .    Last Completed Mammogram            Scheduled - MAMMOGRAM (Every 2 Years) Scheduled for 2024      10/31/2022  Mammo Screening Digital Tomosynthesis Bilateral With CAD    2021  Mammo Screening Digital Tomosynthesis Bilateral With CAD    2020  Mammo Screening Digital Tomosynthesis Bilateral With CAD    2019  Mammo Screening Digital Tomosynthesis Bilateral With CAD    2018  [facility]AP[facility]    Only the first 5 history entries have been loaded, but more history exists.                  Last colonoscopy: has had a colonoscopy 5 year(s) ago.    Last Completed Colonoscopy            COLORECTAL CANCER SCREENING (COLONOSCOPY - Every 10 Years) Next due on 3/13/2028      2018  Colonoscopy    2018  COLONOSCOPY (Done - negative per patient.)                      Last bone density scan (DEXA): None  Exercises Regularly: no  Feelings of Anxiety or  Depression: yes - anxiety      Tobacco Usage?: No       Current Outpatient Medications:     Aspirin Buf,CaCarb-MgCarb-MgO, 81 MG tablet, Take 1 tablet by mouth., Disp: , Rfl:     atorvastatin (LIPITOR) 20 MG tablet, Take 1 tablet by mouth Daily., Disp: 90 tablet, Rfl: 3    cetirizine (zyrTEC) 10 MG tablet, Take 1 tablet by mouth Daily., Disp: 90 tablet, Rfl: 3    cyclobenzaprine (FLEXERIL) 10 MG tablet, Take 1 tablet by mouth 2 (Two) Times a Day As Needed for Muscle Spasms., Disp: 180 tablet, Rfl: 1    cycloSPORINE (RESTASIS) 0.05 % ophthalmic emulsion, Administer 1 drop to both eyes 2 (Two) Times a Day., Disp: , Rfl:     EPINEPHrine (EPIPEN) 0.3 MG/0.3ML solution auto-injector injection, epinephrine 0.3 mg/0.3 mL injection, auto-injector, Disp: , Rfl:     estradiol (VIVELLE-DOT) 0.1 MG/24HR patch, Place 1 patch on the skin as directed by provider 2 (Two) Times a Week. Please use generic, Disp: 24 each, Rfl: 3    ferrous sulfate 140 (45 Fe) MG tablet controlled-release tablet, Take  by mouth Daily With Breakfast., Disp: , Rfl:     fluticasone (FLONASE) 50 MCG/ACT nasal spray, 2 sprays into the nostril(s) as directed by provider Daily., Disp: 48 g, Rfl: 3    losartan (Cozaar) 100 MG tablet, Take 1 tablet by mouth Daily., Disp: 90 tablet, Rfl: 1    loteprednol etabonate (LOTEMAX) 0.5 % gel ophthalmic gel, As Needed., Disp: , Rfl:     metFORMIN ER (GLUCOPHAGE-XR) 500 MG 24 hr tablet, Take 2 tablets by mouth 2 (Two) Times a Day With Meals., Disp: 360 tablet, Rfl: 3    naproxen (NAPROSYN) 500 MG tablet, Take 1 tablet by mouth 2 (Two) Times a Day As Needed for Moderate Pain (inflammation)., Disp: 180 tablet, Rfl: 1    NON FORMULARY, Inject  under the skin into the appropriate area as directed 1 (One) Time Per Week. Self given, Disp: , Rfl:     olopatadine (PATADAY) 0.2 % solution ophthalmic solution, Administer 1 drop to both eyes Daily., Disp: 7.5 mL, Rfl: 3    potassium chloride (K-DUR,KLOR-CON) 20 MEQ CR tablet, Take 1  tablet by mouth Daily. with food., Disp: 90 tablet, Rfl: 1    rOPINIRole (REQUIP) 2 MG tablet, Take 1 tablet by mouth 2 (Two) Times a Day., Disp: 180 tablet, Rfl: 3    SUMAtriptan (Imitrex) 20 MG/ACT nasal spray, 1 spray into the nostril(s) as directed by provider Every 2 (Two) Hours As Needed for Migraine., Disp: 1 each, Rfl: 11    torsemide (DEMADEX) 20 MG tablet, Take 1 tablet by mouth Daily. For blood pressure and swelling, replacing HCTZ 25mg, Disp: 90 tablet, Rfl: 1    traZODone (DESYREL) 100 MG tablet, Take 1/2 to 1 po qhs prn insomnia, Disp: 90 tablet, Rfl: 3    Patient is requesting refills of HRT.    OB History          2    Para   2    Term   2            AB        Living             SAB        IAB        Ectopic        Molar        Multiple        Live Births                    Past Medical History:   Diagnosis Date    Abnormal Pap smear of cervix     Anemia     PRENICIOUS    Angioedema     FROM LISINOPRIL    AR (allergic rhinitis)     Benign essential HTN     Carpal tunnel syndrome     Chronic fatigue syndrome     DM type 2 with diabetic dyslipidemia     Dyspareunia, female     Encounter for long-term (current) use of other medications 2022    Endometriosis     Fatty liver     Noted on CT scan    GERD (gastroesophageal reflux disease)     Headache     High risk medication use     Hyperglycemia     Hyperlipidemia     ASSOCIATED WITH TYPE 2 DIABETES MELLITUS    Hypertension     Iron deficiency anemia     Kidney stone 2022    Nonobstructive, seen on the left on CT scan of chest    Low iron     Lung nodule seen on imaging study 2022    Stable from 6 months prior, follow-up recommended in 1 year    Migraine     Obesity     Ovarian cyst     Pernicious anemia     Polycystic disease, ovaries     Prediabetes     diet controlled    Pregnancy     VAGINAL FORCEPS    Recurrent UTI     RLS (restless legs syndrome)     Sleep apnea     Type 2 diabetes mellitus without  complication         Past Surgical History:   Procedure Laterality Date    BLADDER SURGERY      bladder rectum tacted     CARPAL TUNNEL RELEASE Right     COLONOSCOPY  2018    DIAGNOSTIC LAPAROSCOPY      EXPLORATORY LAPAROTOMY      x2 endo    LASIK  2015    OOPHORECTOMY      TLH/BSO 2019 for endo    TONSILLECTOMY      TOTAL ABDOMINAL HYSTERECTOMY WITH SALPINGO OOPHORECTOMY  09/2019    TOTAL LAPAROSCOPIC HYSTERECTOMY SALPINGO OOPHORECTOMY N/A 09/11/2019    Procedure: TOTAL LAPAROSCOPIC HYSTERECTOMY BILATERAL SALPINGECTOMY, OOPHORECTOMY, CYSTOSCOPY;  Surgeon: Faye Mac MD;  Location: [facility];  Service: Obstetrics/Gynecology    URETHRAL DILATION      x3-4    UVULECTOMY         Health Maintenance   Topic Date Due    BMI FOLLOWUP  Never done    DIABETIC FOOT EXAM  Never done    INFLUENZA VACCINE  08/01/2023    COVID-19 Vaccine (3 - 2023-24 season) 09/01/2023    Hepatitis B (2 of 3 - 19+ 3-dose series) 09/18/2023    PAP SMEAR  11/10/2023    Annual Gynecologic Pelvic and Breast Exam  11/11/2023    DIABETIC EYE EXAM  01/02/2024    URINE MICROALBUMIN  02/20/2024    HEMOGLOBIN A1C  02/21/2024    ANNUAL PHYSICAL  08/21/2024    LIPID PANEL  08/21/2024    MAMMOGRAM  10/31/2024    COLORECTAL CANCER SCREENING  03/13/2028    TDAP/TD VACCINES (2 - Td or Tdap) 04/27/2028    HEPATITIS C SCREENING  Completed    Pneumococcal Vaccine 0-64  Completed    ZOSTER VACCINE  Completed       The additional following portions of the patient's history were reviewed and updated as appropriate: allergies, current medications, past family history, past medical history, past social history, and past surgical history.    Review of Systems   Constitutional: Negative.    HENT: Negative.     Eyes: Negative.    Respiratory: Negative.     Cardiovascular: Negative.    Gastrointestinal: Negative.    Endocrine: Negative.    Genitourinary: Negative.    Musculoskeletal: Negative.    Skin: Negative.    Allergic/Immunologic: Negative.    Neurological:  Negative.    Hematological: Negative.    Psychiatric/Behavioral: Negative.         I have reviewed and agree with the HPI, ROS, and historical information as entered above. Terri Jo, APRN      Objective   /84   Wt 99.8 kg (220 lb)   LMP 07/18/2019   BMI 37.76 kg/m²     Physical Exam  Vitals and nursing note reviewed. Exam conducted with a chaperone present.   Constitutional:       Appearance: Normal appearance. She is well-developed.   HENT:      Head: Normocephalic and atraumatic.   Neck:      Thyroid: No thyroid mass or thyromegaly.   Cardiovascular:      Rate and Rhythm: Normal rate.      Heart sounds: No murmur heard.  Pulmonary:      Effort: Pulmonary effort is normal. No retractions.      Breath sounds: No wheezing, rhonchi or rales.   Chest:      Chest wall: No mass or tenderness.   Breasts:     Right: Normal. No mass, nipple discharge, skin change or tenderness.      Left: Normal. No mass, nipple discharge, skin change or tenderness.   Abdominal:      Palpations: Abdomen is soft. Abdomen is not rigid. There is no mass.      Tenderness: There is no abdominal tenderness. There is no guarding.      Hernia: No hernia is present.   Genitourinary:     General: Normal vulva.      Exam position: Lithotomy position.      Labia:         Right: No rash, tenderness or lesion.         Left: No rash, tenderness or lesion.       Vagina: Normal. No vaginal discharge or lesions.      Uterus: Absent.       Rectum: Normal. No external hemorrhoid.      Comments: S/P NILAY BSO  Musculoskeletal:      Cervical back: Normal range of motion. No muscular tenderness.   Neurological:      Mental Status: She is alert and oriented to person, place, and time.   Psychiatric:         Behavior: Behavior normal.            Assessment and Plan    Problem List Items Addressed This Visit    None  Visit Diagnoses       Pap test, as part of routine gynecological examination    -  Primary    Relevant Orders    LIQUID-BASED PAP  SMEAR WITH HPV GENOTYPING IF ASCUS (ROSINA,COR,MAD)    CBC (No Diff)    Comprehensive Metabolic Panel    Lipid Panel    TSH    Vitamin D,25-Hydroxy    Routine gynecological examination        Relevant Orders    LIQUID-BASED PAP SMEAR WITH HPV GENOTYPING IF ASCUS (ROSINA,COR,MAD)    CBC (No Diff)    Comprehensive Metabolic Panel    Lipid Panel    TSH    Vitamin D,25-Hydroxy    Menopausal symptoms        Relevant Medications    estradiol (VIVELLE-DOT) 0.1 MG/24HR patch    Encounter for screening mammogram for malignant neoplasm of breast        Relevant Orders    Mammo Screening Digital Tomosynthesis Bilateral With CAD            GYN annual well woman exam.   Pap guidelines reviewed.   Doing well on hrt and desires to continue. Refills sent.  Labs today: annual panel  Reviewed monthly self breast exams.  Instructed to call with lumps, pain, or breast discharge.  Yearly mammograms ordered.  Ordered mammogram today.  Reviewed exercise as a preventative health measures.   Reviewed risks of ERT including increased risk of breast cancer, increased blood clots, increased heart disease.  Patient strongly desires to stay on or start ERT.  She understands we will use the lowest amount that adequately controls her symptoms.  RTC in 1 year or PRN with problems.          Terri Jo, APRN  12/28/2023

## 2023-12-29 LAB
25(OH)D3+25(OH)D2 SERPL-MCNC: 24.3 NG/ML (ref 30–100)
ALBUMIN SERPL-MCNC: 4.1 G/DL (ref 3.8–4.9)
ALBUMIN/GLOB SERPL: 1.5 {RATIO} (ref 1.2–2.2)
ALP SERPL-CCNC: 141 IU/L (ref 44–121)
ALT SERPL-CCNC: 28 IU/L (ref 0–32)
AST SERPL-CCNC: 19 IU/L (ref 0–40)
BILIRUB SERPL-MCNC: 0.4 MG/DL (ref 0–1.2)
BUN SERPL-MCNC: 13 MG/DL (ref 6–24)
BUN/CREAT SERPL: 20 (ref 9–23)
CALCIUM SERPL-MCNC: 9.1 MG/DL (ref 8.7–10.2)
CHLORIDE SERPL-SCNC: 100 MMOL/L (ref 96–106)
CHOLEST SERPL-MCNC: 126 MG/DL (ref 100–199)
CO2 SERPL-SCNC: 24 MMOL/L (ref 20–29)
CREAT SERPL-MCNC: 0.64 MG/DL (ref 0.57–1)
EGFRCR SERPLBLD CKD-EPI 2021: 104 ML/MIN/1.73
ERYTHROCYTE [DISTWIDTH] IN BLOOD BY AUTOMATED COUNT: 12.6 % (ref 11.7–15.4)
GLOBULIN SER CALC-MCNC: 2.7 G/DL (ref 1.5–4.5)
GLUCOSE SERPL-MCNC: 108 MG/DL (ref 70–99)
HCT VFR BLD AUTO: 42.7 % (ref 34–46.6)
HDLC SERPL-MCNC: 44 MG/DL
HGB BLD-MCNC: 14.4 G/DL (ref 11.1–15.9)
LDLC SERPL CALC-MCNC: 56 MG/DL (ref 0–99)
MCH RBC QN AUTO: 28.4 PG (ref 26.6–33)
MCHC RBC AUTO-ENTMCNC: 33.7 G/DL (ref 31.5–35.7)
MCV RBC AUTO: 84 FL (ref 79–97)
PLATELET # BLD AUTO: 269 X10E3/UL (ref 150–450)
POTASSIUM SERPL-SCNC: 3.8 MMOL/L (ref 3.5–5.2)
PROT SERPL-MCNC: 6.8 G/DL (ref 6–8.5)
RBC # BLD AUTO: 5.07 X10E6/UL (ref 3.77–5.28)
SODIUM SERPL-SCNC: 140 MMOL/L (ref 134–144)
TRIGL SERPL-MCNC: 149 MG/DL (ref 0–149)
TSH SERPL DL<=0.005 MIU/L-ACNC: 1.88 UIU/ML (ref 0.45–4.5)
VLDLC SERPL CALC-MCNC: 26 MG/DL (ref 5–40)
WBC # BLD AUTO: 7.6 X10E3/UL (ref 3.4–10.8)

## 2024-01-02 DIAGNOSIS — E55.9 VITAMIN D DEFICIENCY: Primary | ICD-10-CM

## 2024-01-02 RX ORDER — ERGOCALCIFEROL 1.25 MG/1
50000 CAPSULE ORAL WEEKLY
Qty: 8 CAPSULE | Refills: 0 | Status: SHIPPED | OUTPATIENT
Start: 2024-01-02

## 2024-01-02 RX ORDER — LOSARTAN POTASSIUM 100 MG/1
100 TABLET ORAL DAILY
Qty: 90 TABLET | Refills: 0 | Status: SHIPPED | OUTPATIENT
Start: 2024-01-02

## 2024-01-08 ENCOUNTER — HOSPITAL ENCOUNTER (OUTPATIENT)
Dept: MAMMOGRAPHY | Facility: HOSPITAL | Age: 59
Discharge: HOME | End: 2024-01-08
Admitting: OBSTETRICS & GYNECOLOGY
Payer: COMMERCIAL

## 2024-01-08 DIAGNOSIS — Z12.31 SCREENING MAMMOGRAM, ENCOUNTER FOR: ICD-10-CM

## 2024-01-08 PROCEDURE — 77063 BREAST TOMOSYNTHESIS BI: CPT

## 2024-01-08 PROCEDURE — 77067 SCR MAMMO BI INCL CAD: CPT

## 2024-01-09 ENCOUNTER — OFFICE VISIT (OUTPATIENT)
Dept: FAMILY MEDICINE CLINIC | Facility: CLINIC | Age: 59
End: 2024-01-09
Payer: COMMERCIAL

## 2024-01-09 VITALS
BODY MASS INDEX: 37.56 KG/M2 | OXYGEN SATURATION: 96 % | TEMPERATURE: 98.1 F | SYSTOLIC BLOOD PRESSURE: 144 MMHG | HEART RATE: 72 BPM | HEIGHT: 64 IN | DIASTOLIC BLOOD PRESSURE: 98 MMHG | WEIGHT: 220 LBS

## 2024-01-09 DIAGNOSIS — N76.0 ACUTE VAGINITIS: ICD-10-CM

## 2024-01-09 DIAGNOSIS — J01.10 ACUTE NON-RECURRENT FRONTAL SINUSITIS: Primary | ICD-10-CM

## 2024-01-09 PROCEDURE — 77063 BREAST TOMOSYNTHESIS BI: CPT | Performed by: RADIOLOGY

## 2024-01-09 PROCEDURE — 99213 OFFICE O/P EST LOW 20 MIN: CPT | Performed by: INTERNAL MEDICINE

## 2024-01-09 PROCEDURE — 77067 SCR MAMMO BI INCL CAD: CPT | Performed by: RADIOLOGY

## 2024-01-09 RX ORDER — BENZONATATE 100 MG/1
100 CAPSULE ORAL 3 TIMES DAILY PRN
Qty: 21 CAPSULE | Refills: 0 | Status: SHIPPED | OUTPATIENT
Start: 2024-01-09

## 2024-01-09 RX ORDER — AMOXICILLIN AND CLAVULANATE POTASSIUM 875; 125 MG/1; MG/1
1 TABLET, FILM COATED ORAL 2 TIMES DAILY
Qty: 14 TABLET | Refills: 0 | Status: SHIPPED | OUTPATIENT
Start: 2024-01-09 | End: 2024-01-16

## 2024-01-09 RX ORDER — FLUCONAZOLE 150 MG/1
150 TABLET ORAL WEEKLY
Qty: 2 TABLET | Refills: 0 | Status: SHIPPED | OUTPATIENT
Start: 2024-01-09

## 2024-01-09 NOTE — PROGRESS NOTES
Office Note     Name: Alia Garza    : 1965     MRN: [id number]     Chief Complaint  Cough (Pt complains of a cough onset 2 weeks. )    Subjective     History of Present Illness:  Alia Garza is a 58 y.o. female who presents today for:    2 week of cough chest congestion sinus congestion, ears feeling full.    Feels cognestion I nteh chest but can't coug hit up    Was exposed to covid  just prior to the illness but her home covid tests were all negative    Has taken guaifenesin, sinus and allergy    Review of Systems:   Review of Systems   Constitutional:  Negative for chills, fever and unexpected weight loss.   HENT:  Positive for ear pain. Negative for postnasal drip, rhinorrhea and sore throat.    Respiratory:  Positive for cough, shortness of breath and wheezing.    Cardiovascular:  Positive for chest pain.   Musculoskeletal:  Positive for myalgias.   Skin:  Negative for rash.       Past Medical History:   Past Medical History:   Diagnosis Date    Abnormal Pap smear of cervix     Anemia     PRENICIOUS    Angioedema     FROM LISINOPRIL    AR (allergic rhinitis)     Benign essential HTN     Carpal tunnel syndrome     Chronic fatigue syndrome     DM type 2 with diabetic dyslipidemia     Dyspareunia, female     Encounter for long-term (current) use of other medications 2022    Endometriosis     Fatty liver     Noted on CT scan    GERD (gastroesophageal reflux disease)     Headache     High risk medication use     Hyperglycemia     Hyperlipidemia     ASSOCIATED WITH TYPE 2 DIABETES MELLITUS    Hypertension     Iron deficiency anemia     Kidney stone 2022    Nonobstructive, seen on the left on CT scan of chest    Low iron     Lung nodule seen on imaging study 2022    Stable from 6 months prior, follow-up recommended in 1 year    Migraine     Obesity     Ovarian cyst     Pernicious anemia     Polycystic disease, ovaries     Prediabetes     diet controlled    Pregnancy      VAGINAL FORCEPS    Recurrent UTI     RLS (restless legs syndrome)     Sleep apnea     Type 2 diabetes mellitus without complication        Past Surgical History:   Past Surgical History:   Procedure Laterality Date    BLADDER SURGERY      bladder rectum tacted     CARPAL TUNNEL RELEASE Right     COLONOSCOPY  2018    DIAGNOSTIC LAPAROSCOPY      EXPLORATORY LAPAROTOMY      x2 endo    LASIK  2015    OOPHORECTOMY      TLH/BSO 2019 for endo    TONSILLECTOMY      TOTAL ABDOMINAL HYSTERECTOMY WITH SALPINGO OOPHORECTOMY  2019    TOTAL LAPAROSCOPIC HYSTERECTOMY SALPINGO OOPHORECTOMY N/A 2019    Procedure: TOTAL LAPAROSCOPIC HYSTERECTOMY BILATERAL SALPINGECTOMY, OOPHORECTOMY, CYSTOSCOPY;  Surgeon: Faye Mac MD;  Location: [facility];  Service: Obstetrics/Gynecology    URETHRAL DILATION      x3-4    UVULECTOMY         Family History:   Family History   Problem Relation Age of Onset    Migraines Mother     Thyroid disease Mother     Hypertension Father     Hyperlipidemia Father     Heart attack Father     Breast cancer Maternal Grandmother 92    Alcohol abuse Maternal Grandfather     Prostate cancer Paternal Grandfather     Breast cancer Maternal Aunt 39    Diabetes Other     Diabetes Other     ADD / ADHD Son     Ovarian cancer Neg Hx     Endometrial cancer Neg Hx     Uterine cancer Neg Hx     Colon cancer Neg Hx        Social History:   Social History     Socioeconomic History    Marital status:    Tobacco Use    Smoking status: Former     Packs/day: 0.50     Years: 3.00     Additional pack years: 0.00     Total pack years: 1.50     Types: Cigarettes     Quit date: 9/10/1989     Years since quittin.4    Smokeless tobacco: Never   Vaping Use    Vaping Use: Never used   Substance and Sexual Activity    Alcohol use: No    Drug use: No    Sexual activity: Yes     Partners: Male     Birth control/protection: None, Hysterectomy       Immunizations:   Immunization History   Administered Date(s) Administered     31-influenza Vac Quardvalent Preservativ 10/07/2019    COVID-19 (MODERNA) 1st,2nd,3rd Dose Monovalent 04/16/2021, 05/14/2021    Flu Vaccine Quad PF 6-35MO 10/02/2017    Fluzone (or Fluarix & Flulaval for VFC) >6mos 10/18/2019, 02/20/2023    Fluzone Quad >6mos (Multi-dose) 10/01/2014, 11/03/2015, 10/01/2017, 10/12/2018    Hepatitis A 05/03/2018, 11/05/2018    Hepatitis B Adult/Adolescent IM 08/21/2023    Influenza Quad Vaccine (Inpatient) 10/19/2016    Influenza, Unspecified 10/07/2019    Pneumococcal Conjugate 20-Valent (PCV20) 08/21/2023    Pneumococcal Polysaccharide (PPSV23) 04/23/2010    Shingrix 02/08/2021, 03/18/2022    Tdap 04/27/2018    flucelvax quad pfs =>4 YRS 10/02/2018        Medications:     Current Outpatient Medications:     Aspirin Buf,CaCarb-MgCarb-MgO, 81 MG tablet, Take 1 tablet by mouth., Disp: , Rfl:     atorvastatin (LIPITOR) 20 MG tablet, Take 1 tablet by mouth Daily., Disp: 90 tablet, Rfl: 3    cetirizine (zyrTEC) 10 MG tablet, Take 1 tablet by mouth Daily., Disp: 90 tablet, Rfl: 3    cyclobenzaprine (FLEXERIL) 10 MG tablet, Take 1 tablet by mouth 2 (Two) Times a Day As Needed for Muscle Spasms., Disp: 180 tablet, Rfl: 1    cycloSPORINE (RESTASIS) 0.05 % ophthalmic emulsion, Administer 1 drop to both eyes 2 (Two) Times a Day., Disp: , Rfl:     EPINEPHrine (EPIPEN) 0.3 MG/0.3ML solution auto-injector injection, epinephrine 0.3 mg/0.3 mL injection, auto-injector, Disp: , Rfl:     ergocalciferol (ERGOCALCIFEROL) 1.25 MG (66959 UT) capsule, Take 1 capsule by mouth 1 (One) Time Per Week., Disp: 8 capsule, Rfl: 0    estradiol (VIVELLE-DOT) 0.1 MG/24HR patch, Place 1 patch on the skin as directed by provider 2 (Two) Times a Week. Please use generic, Disp: 24 each, Rfl: 3    ferrous sulfate 140 (45 Fe) MG tablet controlled-release tablet, Take  by mouth Daily With Breakfast., Disp: , Rfl:     fluticasone (FLONASE) 50 MCG/ACT nasal spray, 2 sprays into the nostril(s) as directed by provider  "Daily., Disp: 48 g, Rfl: 3    losartan (COZAAR) 100 MG tablet, Take 1 tablet by mouth once daily, Disp: 90 tablet, Rfl: 0    loteprednol etabonate (LOTEMAX) 0.5 % gel ophthalmic gel, As Needed., Disp: , Rfl:     metFORMIN ER (GLUCOPHAGE-XR) 500 MG 24 hr tablet, Take 2 tablets by mouth 2 (Two) Times a Day With Meals., Disp: 360 tablet, Rfl: 3    olopatadine (PATADAY) 0.2 % solution ophthalmic solution, Administer 1 drop to both eyes Daily., Disp: 7.5 mL, Rfl: 3    potassium chloride (K-DUR,KLOR-CON) 20 MEQ CR tablet, Take 1 tablet by mouth Daily. with food., Disp: 90 tablet, Rfl: 1    rOPINIRole (REQUIP) 2 MG tablet, Take 1 tablet by mouth 2 (Two) Times a Day., Disp: 180 tablet, Rfl: 3    SUMAtriptan (Imitrex) 20 MG/ACT nasal spray, 1 spray into the nostril(s) as directed by provider Every 2 (Two) Hours As Needed for Migraine., Disp: 1 each, Rfl: 11    torsemide (DEMADEX) 20 MG tablet, Take 1 tablet by mouth Daily. For blood pressure and swelling, replacing HCTZ 25mg, Disp: 90 tablet, Rfl: 1    traZODone (DESYREL) 100 MG tablet, Take 1/2 to 1 po qhs prn insomnia, Disp: 90 tablet, Rfl: 3    naproxen (NAPROSYN) 500 MG tablet, Take 1 tablet by mouth 2 (Two) Times a Day As Needed for Moderate Pain (inflammation). (Patient not taking: Reported on 1/9/2024), Disp: 180 tablet, Rfl: 1    NON FORMULARY, Inject  under the skin into the appropriate area as directed 1 (One) Time Per Week. Self given, Disp: , Rfl:     Allergies:   Allergies   Allergen Reactions    Lisinopril Swelling    Ace Inhibitors Swelling     Swelling of lips, patient states her breathing had not been effected        Objective     Vital Signs  /98   Pulse 72   Temp 98.1 °F (36.7 °C) (Oral)   Ht 162.6 cm (64\")   Wt 99.8 kg (220 lb)   SpO2 96%   BMI 37.76 kg/m²   Estimated body mass index is 37.76 kg/m² as calculated from the following:    Height as of this encounter: 162.6 cm (64\").    Weight as of this encounter: 99.8 kg (220 " lb).          Physical Exam  Vitals and nursing note reviewed.   Constitutional:       General: She is not in acute distress.     Appearance: Normal appearance.   HENT:      Right Ear: Tympanic membrane normal.      Left Ear: Tympanic membrane normal.      Nose: Rhinorrhea present.      Mouth/Throat:      Pharynx: No oropharyngeal exudate or posterior oropharyngeal erythema.   Eyes:      Conjunctiva/sclera: Conjunctivae normal.   Cardiovascular:      Rate and Rhythm: Normal rate and regular rhythm.      Heart sounds: Normal heart sounds.   Pulmonary:      Effort: Pulmonary effort is normal.      Breath sounds: Normal breath sounds. No wheezing, rhonchi or rales.   Neurological:      Mental Status: She is alert.          Procedures     Assessment and Plan     1. Acute non-recurrent frontal sinusitis    - amoxicillin-clavulanate (AUGMENTIN) 875-125 MG per tablet; Take 1 tablet by mouth 2 (Two) Times a Day for 7 days.  Dispense: 14 tablet; Refill: 0  - benzonatate (Tessalon Perles) 100 MG capsule; Take 1 capsule by mouth 3 (Three) Times a Day As Needed for Cough.  Dispense: 21 capsule; Refill: 0    2. Acute vaginitis    - fluconazole (Diflucan) 150 MG tablet; Take 1 tablet by mouth 1 (One) Time Per Week.  Dispense: 2 tablet; Refill: 0       Follow Up  Return if symptoms worsen or fail to improve.    Patient was advised to call the office or seek medical care if  any issues discussed during this visit worsen or persist or if new concerns arise        MD ELADIA Zhang [facility]  [address] [place] [id number] [phone]  Answers submitted by the patient for this visit:  Primary Reason for Visit (Submitted on 1/9/2024)  What is the primary reason for your visit?: Cough  Cough Questionnaire (Submitted on 1/9/2024)  Chief Complaint: Cough  Chronicity: new  Onset: 1 to 4 weeks ago  Progression since onset: worsening  Frequency: constantly  Cough  characteristics: rattling  ear congestion: Yes  headaches: Yes  heartburn: No  hemoptysis: No  nasal congestion: Yes  sweats: No

## 2024-01-12 LAB — REF LAB TEST METHOD: NORMAL

## 2024-02-19 ENCOUNTER — OFFICE VISIT (OUTPATIENT)
Dept: FAMILY MEDICINE CLINIC | Facility: CLINIC | Age: 59
End: 2024-02-19
Payer: COMMERCIAL

## 2024-02-19 ENCOUNTER — TELEPHONE (OUTPATIENT)
Dept: FAMILY MEDICINE CLINIC | Facility: CLINIC | Age: 59
End: 2024-02-19

## 2024-02-19 VITALS
DIASTOLIC BLOOD PRESSURE: 80 MMHG | HEIGHT: 64 IN | SYSTOLIC BLOOD PRESSURE: 140 MMHG | BODY MASS INDEX: 38.24 KG/M2 | OXYGEN SATURATION: 96 % | HEART RATE: 78 BPM | WEIGHT: 224 LBS

## 2024-02-19 DIAGNOSIS — E78.2 MIXED HYPERLIPIDEMIA: ICD-10-CM

## 2024-02-19 DIAGNOSIS — R80.9 TYPE 2 DIABETES MELLITUS WITH MICROALBUMINURIA, WITHOUT LONG-TERM CURRENT USE OF INSULIN: Primary | ICD-10-CM

## 2024-02-19 DIAGNOSIS — E11.42 TYPE 2 DIABETES MELLITUS WITH PERIPHERAL NEUROPATHY: ICD-10-CM

## 2024-02-19 DIAGNOSIS — D51.0 PERNICIOUS ANEMIA: ICD-10-CM

## 2024-02-19 DIAGNOSIS — E55.9 VITAMIN D DEFICIENCY: ICD-10-CM

## 2024-02-19 DIAGNOSIS — E66.01 MORBID (SEVERE) OBESITY DUE TO EXCESS CALORIES: ICD-10-CM

## 2024-02-19 DIAGNOSIS — K76.0 FATTY LIVER: ICD-10-CM

## 2024-02-19 DIAGNOSIS — Z79.899 ENCOUNTER FOR LONG-TERM (CURRENT) USE OF OTHER MEDICATIONS: ICD-10-CM

## 2024-02-19 DIAGNOSIS — R53.83 OTHER FATIGUE: ICD-10-CM

## 2024-02-19 DIAGNOSIS — K21.9 GASTROESOPHAGEAL REFLUX DISEASE WITHOUT ESOPHAGITIS: ICD-10-CM

## 2024-02-19 DIAGNOSIS — E11.29 TYPE 2 DIABETES MELLITUS WITH MICROALBUMINURIA, WITHOUT LONG-TERM CURRENT USE OF INSULIN: Primary | ICD-10-CM

## 2024-02-19 DIAGNOSIS — I10 BENIGN ESSENTIAL HYPERTENSION: ICD-10-CM

## 2024-02-19 DIAGNOSIS — E87.6 HYPOKALEMIA: ICD-10-CM

## 2024-02-19 PROCEDURE — 99214 OFFICE O/P EST MOD 30 MIN: CPT | Performed by: FAMILY MEDICINE

## 2024-02-19 RX ORDER — METFORMIN HYDROCHLORIDE 500 MG/1
500 TABLET, EXTENDED RELEASE ORAL 2 TIMES DAILY WITH MEALS
Qty: 180 TABLET | Refills: 3 | Status: SHIPPED | OUTPATIENT
Start: 2024-02-19 | End: 2024-02-19 | Stop reason: SDUPTHER

## 2024-02-19 RX ORDER — SEMAGLUTIDE 0.68 MG/ML
INJECTION, SOLUTION SUBCUTANEOUS
Qty: 9 ML | Refills: 1 | Status: SHIPPED | OUTPATIENT
Start: 2024-02-19

## 2024-02-19 RX ORDER — PREDNISONE 20 MG/1
1 TABLET ORAL DAILY
COMMUNITY
Start: 2024-01-24 | End: 2024-02-19

## 2024-02-19 RX ORDER — LOSARTAN POTASSIUM 100 MG/1
100 TABLET ORAL DAILY
Qty: 90 TABLET | Refills: 1 | Status: SHIPPED | OUTPATIENT
Start: 2024-02-19

## 2024-02-19 RX ORDER — METFORMIN HYDROCHLORIDE 500 MG/1
500 TABLET, EXTENDED RELEASE ORAL 2 TIMES DAILY WITH MEALS
Qty: 180 TABLET | Refills: 3 | Status: SHIPPED | OUTPATIENT
Start: 2024-02-19

## 2024-02-19 RX ORDER — AMLODIPINE BESYLATE 5 MG/1
5 TABLET ORAL DAILY
Qty: 90 TABLET | Refills: 2 | Status: SHIPPED | OUTPATIENT
Start: 2024-02-19

## 2024-02-19 RX ORDER — NAPROXEN 500 MG/1
500 TABLET ORAL 2 TIMES DAILY PRN
Qty: 180 TABLET | Refills: 1 | Status: SHIPPED | OUTPATIENT
Start: 2024-02-19

## 2024-02-19 RX ORDER — CYCLOBENZAPRINE HCL 10 MG
10 TABLET ORAL 2 TIMES DAILY PRN
Qty: 180 TABLET | Refills: 3 | Status: SHIPPED | OUTPATIENT
Start: 2024-02-19

## 2024-02-19 RX ORDER — IPRATROPIUM BROMIDE 42 UG/1
2 SPRAY, METERED NASAL
COMMUNITY
Start: 2024-01-24

## 2024-02-19 NOTE — PROGRESS NOTES
"Chief Complaint  Diabetes (6 month med check )    Subjective      Alia Garza presents to [facility]  History of Present Illness  Patient is getting over sinusitis and bronchitis so she does have a persistent cough, but is better.  She did try increasing her metformin to 2000 mg daily, but it caused unacceptable levels of nausea and upset stomach, so after a while she finally cut back to 1000 mg daily which is as high as she can tolerate.  She says even with the lower dose, she still often has upset stomach and loose stool, but at a level that is tolerable.  Patient also says she has not been able to take the torsemide every day because it makes her urinate too much, so she is average taking it 5 days a week, which helps with the edema but has not allowed for good blood pressure control every day.  She has noticed some neuropathy symptoms in her feet lately, mostly tingling and paresthesias in her toes.  She says her mood is good overall, but she does continue to struggle with feeling tired all the time, and a lot of somatic complaints.  She has not been having as many migraines lately, but does occasionally get tension headaches.  Objective   Vital Signs:   Vitals:    02/19/24 1307   BP: 140/80   Pulse: 78   SpO2: 96%   Weight: 102 kg (224 lb)   Height: 162.6 cm (64\")      /80   Pulse 78   Ht 162.6 cm (64\")   Wt 102 kg (224 lb)   SpO2 96%   BMI 38.45 kg/m²     Body mass index is 38.45 kg/m².    Review of Systems   Constitutional:  Positive for fatigue. Negative for chills, fever and unexpected weight loss.   HENT:  Negative for ear discharge, ear pain, mouth sores, nosebleeds, rhinorrhea, sinus pressure, sore throat, swollen glands and trouble swallowing.    Eyes:  Negative for blurred vision, double vision, pain, redness and visual disturbance.   Respiratory:  Positive for cough. Negative for choking, chest tightness, shortness of breath and wheezing.    Cardiovascular:  " Positive for leg swelling. Negative for chest pain and palpitations.        PND, orthopnea   Gastrointestinal:  Positive for nausea and indigestion. Negative for abdominal distention, abdominal pain, blood in stool, constipation, diarrhea, vomiting and GERD.        Dysphagia, odynophagia   Endocrine: Negative for polydipsia, polyphagia and polyuria.   Genitourinary:  Negative for difficulty urinating, dysuria, hematuria and urinary incontinence.   Musculoskeletal:  Negative for arthralgias (unusual/atypica), gait problem, joint swelling and myalgias.   Skin:  Negative for rash, skin lesions (worrisome/suspicious) and bruise.   Allergic/Immunologic: Negative for food allergies.   Neurological:  Positive for numbness. Negative for dizziness, tremors, seizures, syncope, weakness, light-headedness and headache.   Hematological:  Negative for adenopathy. Does not bruise/bleed easily.   Psychiatric/Behavioral:  Negative for suicidal ideas and depressed mood. The patient is not nervous/anxious.        Past History:  Medical History: has a past medical history of Abnormal Pap smear of cervix, Allergic (2000), Anemia, Angioedema (2008), AR (allergic rhinitis), Benign essential HTN, Carpal tunnel syndrome, Chronic fatigue syndrome, DM type 2 with diabetic dyslipidemia, Dyspareunia, female, Encounter for long-term (current) use of other medications (08/19/2022), Endometriosis, Fatty liver (2022), GERD (gastroesophageal reflux disease), Headache, High risk medication use, Hyperglycemia, Hyperlipidemia, Hypertension, Iron deficiency anemia, Kidney stone (07/2022), Low iron, Lung nodule seen on imaging study (07/2022), Migraine, Obesity, Ovarian cyst (1995), Pernicious anemia, Polycystic disease, ovaries, Prediabetes, Pregnancy (2001), Recurrent UTI, RLS (restless legs syndrome), Sleep apnea, and Type 2 diabetes mellitus without complication.   Surgical History: has a past surgical history that includes Tonsillectomy; Colonoscopy  (2018); Bladder surgery; Exploratory laparotomy; Carpal tunnel release (Right); LASIK (2015); total laparoscopic hysterectomy salpingo oophorectomy (N/A, 09/11/2019); Oophorectomy; Urethral dilation; Uvulectomy; Total abdominal hysterectomy w/ bilateral salpingoophorectomy (09/2019); Laparoscopy; and Eye surgery (2015).   Family History: family history includes ADD / ADHD in her son; Alcohol abuse in her maternal grandfather; Anxiety disorder in her mother; Arthritis in her mother; Breast cancer (age of onset: 39) in her maternal aunt; Breast cancer (age of onset: 92) in her maternal grandmother; COPD in her father; Cancer in her maternal aunt, maternal grandmother, and paternal grandfather; Diabetes in her father, maternal aunt, maternal aunt, maternal uncle, mother, and other family members; Heart attack in her father; Hyperlipidemia in her father; Hypertension in her father; Migraines in her mother; Other in her maternal grandmother, mother, sister, and sister; Prostate cancer in her paternal grandfather; Thyroid disease in her mother.   Social History: reports that she quit smoking about 34 years ago. Her smoking use included cigarettes. She has a 1.50 pack-year smoking history. She has never used smokeless tobacco. She reports that she does not drink alcohol and does not use drugs.      Current Outpatient Medications:     Aspirin Buf,CaCarb-MgCarb-MgO, 81 MG tablet, Take 1 tablet by mouth., Disp: , Rfl:     atorvastatin (LIPITOR) 20 MG tablet, Take 1 tablet by mouth Daily., Disp: 90 tablet, Rfl: 3    cetirizine (zyrTEC) 10 MG tablet, Take 1 tablet by mouth Daily., Disp: 90 tablet, Rfl: 3    cyclobenzaprine (FLEXERIL) 10 MG tablet, Take 1 tablet by mouth 2 (Two) Times a Day As Needed for Muscle Spasms., Disp: 180 tablet, Rfl: 3    cycloSPORINE (RESTASIS) 0.05 % ophthalmic emulsion, Administer 1 drop to both eyes 2 (Two) Times a Day., Disp: , Rfl:     EPINEPHrine (EPIPEN) 0.3 MG/0.3ML solution auto-injector  injection, epinephrine 0.3 mg/0.3 mL injection, auto-injector, Disp: , Rfl:     ergocalciferol (ERGOCALCIFEROL) 1.25 MG (57390 UT) capsule, Take 1 capsule by mouth 1 (One) Time Per Week., Disp: 8 capsule, Rfl: 0    estradiol (VIVELLE-DOT) 0.1 MG/24HR patch, Place 1 patch on the skin as directed by provider 2 (Two) Times a Week. Please use generic, Disp: 24 each, Rfl: 3    ferrous sulfate 140 (45 Fe) MG tablet controlled-release tablet, Take  by mouth Daily With Breakfast., Disp: , Rfl:     fluticasone (FLONASE) 50 MCG/ACT nasal spray, 2 sprays into the nostril(s) as directed by provider Daily., Disp: 48 g, Rfl: 3    ipratropium (ATROVENT) 0.06 % nasal spray, 2 sprays into the nostril(s) as directed by provider., Disp: , Rfl:     losartan (COZAAR) 100 MG tablet, Take 1 tablet by mouth Daily., Disp: 90 tablet, Rfl: 1    loteprednol etabonate (LOTEMAX) 0.5 % gel ophthalmic gel, As Needed., Disp: , Rfl:     metFORMIN ER (GLUCOPHAGE-XR) 500 MG 24 hr tablet, Take 1 tablet by mouth 2 (Two) Times a Day With Meals., Disp: 180 tablet, Rfl: 3    naproxen (NAPROSYN) 500 MG tablet, Take 1 tablet by mouth 2 (Two) Times a Day As Needed for Moderate Pain (inflammation)., Disp: 180 tablet, Rfl: 1    olopatadine (PATADAY) 0.2 % solution ophthalmic solution, Administer 1 drop to both eyes Daily., Disp: 7.5 mL, Rfl: 3    potassium chloride (K-DUR,KLOR-CON) 20 MEQ CR tablet, Take 1 tablet by mouth Daily. with food., Disp: 90 tablet, Rfl: 1    rOPINIRole (REQUIP) 2 MG tablet, Take 1 tablet by mouth 2 (Two) Times a Day., Disp: 180 tablet, Rfl: 3    SUMAtriptan (Imitrex) 20 MG/ACT nasal spray, 1 spray into the nostril(s) as directed by provider Every 2 (Two) Hours As Needed for Migraine., Disp: 1 each, Rfl: 11    torsemide (DEMADEX) 20 MG tablet, Take 1 tablet by mouth Daily. For blood pressure and swelling, replacing HCTZ 25mg, Disp: 90 tablet, Rfl: 1    traZODone (DESYREL) 100 MG tablet, Take 1/2 to 1 po qhs prn insomnia, Disp: 90  tablet, Rfl: 3    amLODIPine (NORVASC) 5 MG tablet, Take 1 tablet by mouth Daily., Disp: 90 tablet, Rfl: 2    Semaglutide,0.25 or 0.5MG/DOS, (Ozempic, 0.25 or 0.5 MG/DOSE,) 2 MG/3ML solution pen-injector, 0.25mg SC q week x 4 weeks, then 0.5mg sc q week, Disp: 9 mL, Rfl: 1    Allergies: Lisinopril and Ace inhibitors    Physical Exam  Constitutional:       General: She is not in acute distress.     Appearance: She is obese. She is not toxic-appearing.   HENT:      Head: Normocephalic.      Right Ear: Ear canal and external ear normal.      Left Ear: Ear canal and external ear normal.      Nose: Nose normal.      Mouth/Throat:      Mouth: Mucous membranes are moist.      Pharynx: Oropharynx is clear.   Eyes:      General: No scleral icterus.     Extraocular Movements: Extraocular movements intact.      Conjunctiva/sclera: Conjunctivae normal.      Pupils: Pupils are equal, round, and reactive to light.   Neck:      Vascular: No carotid bruit.   Cardiovascular:      Rate and Rhythm: Normal rate and regular rhythm.      Pulses: Normal pulses.           Dorsalis pedis pulses are 2+ on the right side and 2+ on the left side.        Posterior tibial pulses are 2+ on the right side and 2+ on the left side.      Heart sounds: Normal heart sounds.   Pulmonary:      Effort: Pulmonary effort is normal.      Breath sounds: Normal breath sounds.   Chest:      Chest wall: No tenderness.   Abdominal:      General: Bowel sounds are normal. There is no distension.      Palpations: Abdomen is soft.      Tenderness: There is no abdominal tenderness. There is no guarding or rebound.   Musculoskeletal:         General: No swelling or deformity. Normal range of motion.      Cervical back: Normal range of motion. No rigidity.      Right lower leg: No edema.      Left lower leg: No edema.      Right foot: No deformity.      Left foot: No deformity.   Feet:      Right foot:      Protective Sensation: 5 sites tested.  5 sites sensed.       Skin integrity: Skin integrity normal.      Left foot:      Protective Sensation: 5 sites tested.  5 sites sensed.      Skin integrity: Skin integrity normal.      Comments:      Lymphadenopathy:      Cervical: No cervical adenopathy.   Skin:     General: Skin is warm and dry.      Capillary Refill: Capillary refill takes less than 2 seconds.      Coloration: Skin is not jaundiced or pale.      Findings: No erythema or rash.   Neurological:      General: No focal deficit present.      Mental Status: She is alert and oriented to person, place, and time.      Cranial Nerves: No cranial nerve deficit.      Motor: No weakness.      Coordination: Coordination normal.      Gait: Gait normal.   Psychiatric:         Mood and Affect: Mood normal.         Behavior: Behavior normal.         Thought Content: Thought content normal.         Judgment: Judgment normal.                   Assessment and Plan   Diagnoses and all orders for this visit:    1. Type 2 diabetes mellitus with microalbuminuria, without long-term current use of insulin (Primary)  -     Hemoglobin A1c  Patient's hemoglobin A1c was 7.3 while she was taking the maximum tolerated dose of metformin, so this level of control is suboptimal.  Her insurance has refused to cover GLP-1 agents in the past, but she would like to try to get a prescription again this year with another attempt to prior authorize, as she does have morbid obesity with multiple comorbidities that would benefit significantly from weight loss, including the diabetes, hypertension, hyperlipidemia, GERD, fatty liver, and sleep apnea.  She denies any contraindications to medication, understands the risks and possible side effects, and would like to proceed with adding Ozempic if her insurance will agree to cover it.  2. Benign essential hypertension    3. Encounter for long-term (current) use of other medications  -     CBC & Differential  -     Comprehensive Metabolic Panel  -     Magnesium    4.  Mixed hyperlipidemia  -     Lipid Panel    5. Hypokalemia    6. Fatty liver    7. Pernicious anemia  -     Vitamin B12  -     Folate    8. Gastroesophageal reflux disease without esophagitis    9. Other fatigue  -     TSH+Free T4    10. Vitamin D deficiency  -     Vitamin D,25-Hydroxy    11. Type 2 diabetes mellitus with peripheral neuropathy  See details above, patient has had to lower the dose of metformin extended release to 1000 mg daily because 2000 mg daily caused intolerable amount of GI upset.  Her monofilament exam was normal, but she is having peripheral neuropathy symptoms now, although not bad enough to require any specific medical treatment, other than getting her diabetes under better control.  12. Morbid (severe) obesity due to excess calories  The patient has tried diligently to limit her portion control, eat appropriate diet, and exercise regularly but has not been able to lose weight.  Hopefully her insurance will provide coverage for Ozempic, or something in that family, or possibly Mounjaro, as she is willing to try any of those to help with weight loss.  Other orders  -     amLODIPine (NORVASC) 5 MG tablet; Take 1 tablet by mouth Daily.  Dispense: 90 tablet; Refill: 2  -     Discontinue: metFORMIN ER (GLUCOPHAGE-XR) 500 MG 24 hr tablet; Take 1 tablet by mouth 2 (Two) Times a Day With Meals.  Dispense: 180 tablet; Refill: 3  -     losartan (COZAAR) 100 MG tablet; Take 1 tablet by mouth Daily.  Dispense: 90 tablet; Refill: 1  -     cyclobenzaprine (FLEXERIL) 10 MG tablet; Take 1 tablet by mouth 2 (Two) Times a Day As Needed for Muscle Spasms.  Dispense: 180 tablet; Refill: 3  -     naproxen (NAPROSYN) 500 MG tablet; Take 1 tablet by mouth 2 (Two) Times a Day As Needed for Moderate Pain (inflammation).  Dispense: 180 tablet; Refill: 1  -     Semaglutide,0.25 or 0.5MG/DOS, (Ozempic, 0.25 or 0.5 MG/DOSE,) 2 MG/3ML solution pen-injector; 0.25mg SC q week x 4 weeks, then 0.5mg sc q week  Dispense: 9  mL; Refill: 1  -     metFORMIN ER (GLUCOPHAGE-XR) 500 MG 24 hr tablet; Take 1 tablet by mouth 2 (Two) Times a Day With Meals.  Dispense: 180 tablet; Refill: 3            Follow Up   Return in about 6 months (around 8/19/2024) for Annual physical.  Patient was given instructions and counseling regarding her condition or for health maintenance advice. Please see specific information pulled into the AVS if appropriate.     Jhon Edmond MD

## 2024-02-19 NOTE — TELEPHONE ENCOUNTER
Diabetic foot exam done today, please update care gaps. Also, PA will be needed on ozempic, A1C was 7.3 on maximum dose of metformin ER 2,000mg daily, so DM not controlled. (We did just lower her dose today back to 1000mg qd as she could not tolerate the 2000mg dose, so 1000mg is the maximum tolerated dose)

## 2024-02-20 LAB
25(OH)D3+25(OH)D2 SERPL-MCNC: 55 NG/ML (ref 30–100)
ALBUMIN SERPL-MCNC: 3.9 G/DL (ref 3.8–4.9)
ALBUMIN/GLOB SERPL: 1.6 {RATIO} (ref 1.2–2.2)
ALP SERPL-CCNC: 133 IU/L (ref 44–121)
ALT SERPL-CCNC: 30 IU/L (ref 0–32)
AST SERPL-CCNC: 19 IU/L (ref 0–40)
BASOPHILS # BLD AUTO: 0 X10E3/UL (ref 0–0.2)
BASOPHILS NFR BLD AUTO: 0 %
BILIRUB SERPL-MCNC: 0.4 MG/DL (ref 0–1.2)
BUN SERPL-MCNC: 15 MG/DL (ref 6–24)
BUN/CREAT SERPL: 24 (ref 9–23)
CALCIUM SERPL-MCNC: 9.2 MG/DL (ref 8.7–10.2)
CHLORIDE SERPL-SCNC: 99 MMOL/L (ref 96–106)
CHOLEST SERPL-MCNC: 114 MG/DL (ref 100–199)
CO2 SERPL-SCNC: 25 MMOL/L (ref 20–29)
CREAT SERPL-MCNC: 0.63 MG/DL (ref 0.57–1)
EGFRCR SERPLBLD CKD-EPI 2021: 104 ML/MIN/1.73
EOSINOPHIL # BLD AUTO: 0.1 X10E3/UL (ref 0–0.4)
EOSINOPHIL NFR BLD AUTO: 2 %
ERYTHROCYTE [DISTWIDTH] IN BLOOD BY AUTOMATED COUNT: 13.2 % (ref 11.7–15.4)
FOLATE SERPL-MCNC: 8.8 NG/ML
GLOBULIN SER CALC-MCNC: 2.4 G/DL (ref 1.5–4.5)
GLUCOSE SERPL-MCNC: 182 MG/DL (ref 70–99)
HBA1C MFR BLD: 7.5 % (ref 4.8–5.6)
HCT VFR BLD AUTO: 40.8 % (ref 34–46.6)
HDLC SERPL-MCNC: 39 MG/DL
HGB BLD-MCNC: 13.7 G/DL (ref 11.1–15.9)
IMM GRANULOCYTES # BLD AUTO: 0 X10E3/UL (ref 0–0.1)
IMM GRANULOCYTES NFR BLD AUTO: 0 %
LDLC SERPL CALC-MCNC: 51 MG/DL (ref 0–99)
LYMPHOCYTES # BLD AUTO: 1.7 X10E3/UL (ref 0.7–3.1)
LYMPHOCYTES NFR BLD AUTO: 23 %
MAGNESIUM SERPL-MCNC: 1.7 MG/DL (ref 1.6–2.3)
MCH RBC QN AUTO: 28.4 PG (ref 26.6–33)
MCHC RBC AUTO-ENTMCNC: 33.6 G/DL (ref 31.5–35.7)
MCV RBC AUTO: 85 FL (ref 79–97)
MONOCYTES # BLD AUTO: 0.6 X10E3/UL (ref 0.1–0.9)
MONOCYTES NFR BLD AUTO: 8 %
NEUTROPHILS # BLD AUTO: 4.8 X10E3/UL (ref 1.4–7)
NEUTROPHILS NFR BLD AUTO: 67 %
PLATELET # BLD AUTO: 248 X10E3/UL (ref 150–450)
POTASSIUM SERPL-SCNC: 3.9 MMOL/L (ref 3.5–5.2)
PROT SERPL-MCNC: 6.3 G/DL (ref 6–8.5)
RBC # BLD AUTO: 4.83 X10E6/UL (ref 3.77–5.28)
SODIUM SERPL-SCNC: 139 MMOL/L (ref 134–144)
T4 FREE SERPL-MCNC: 1.05 NG/DL (ref 0.82–1.77)
TRIGL SERPL-MCNC: 137 MG/DL (ref 0–149)
TSH SERPL DL<=0.005 MIU/L-ACNC: 0.97 UIU/ML (ref 0.45–4.5)
VIT B12 SERPL-MCNC: 423 PG/ML (ref 232–1245)
VLDLC SERPL CALC-MCNC: 24 MG/DL (ref 5–40)
WBC # BLD AUTO: 7.3 X10E3/UL (ref 3.4–10.8)

## 2024-02-20 NOTE — TELEPHONE ENCOUNTER
Diabetic foot exam done today, please update care gaps. Also, PA will be needed on ozempic, A1C was 7.3 on maximum dose of metformin ER 2,000mg daily, so DM not controlled. (We did just lower her dose today back to 1000mg qd as she could not tolerate the 2000mg dose, so 1000mg is the maximum tolerated dose)       Diabetic Foot Exam Care Gap updated working on pa for ozempic

## 2024-06-17 RX ORDER — POTASSIUM CHLORIDE 20 MEQ/1
TABLET, EXTENDED RELEASE ORAL
Qty: 90 TABLET | Refills: 0 | Status: SHIPPED | OUTPATIENT
Start: 2024-06-17

## 2024-06-17 RX ORDER — SEMAGLUTIDE 1.34 MG/ML
1 INJECTION, SOLUTION SUBCUTANEOUS WEEKLY
Qty: 9 ML | Refills: 1 | Status: SHIPPED | OUTPATIENT
Start: 2024-06-17

## 2024-07-01 RX ORDER — TORSEMIDE 20 MG/1
TABLET ORAL
Qty: 90 TABLET | Refills: 1 | Status: SHIPPED | OUTPATIENT
Start: 2024-07-01

## 2024-08-09 RX ORDER — SEMAGLUTIDE 0.68 MG/ML
INJECTION, SOLUTION SUBCUTANEOUS
Qty: 9 ML | Refills: 0 | OUTPATIENT
Start: 2024-08-09

## 2024-08-14 NOTE — PROGRESS NOTES
Follow-up Visit      Date: 08/15/2024  Patient Name: Alia Garza  : 1965   MRN: [id number]     Chief Complaint:    Chief Complaint   Patient presents with    Dyspnea on exertion       History of Present Illness: Alia Garza is a 58 y.o. female who is here today for follow-up on her multiple medical problems.  Patient was seen by my partner about a year ago and stress test was also recommended at that time but she wants to wait and make sure she can control her risk factors more aggressively.  She has lost about 20 to 25 pounds.  Her blood pressure however is still running high.  She denies any significant chest pain but does have shortness of breath off-and-on.  She has multiple risk factors along with it.    She denies any lower extremity edema.  Though she was started on Norvasc and she is starting having those problems again and was started on losartan which relieve of the edema.    She has sleep apnea and has been using her machine for that.      Problem List     CARDIAC  Coronary Artery Disease:   High risk     Myocardium:   2023 Echo EF 50%, mild LVH     Valvular:   Mild MR     Electrical:   NSR     Pericardium:   Normal       CARDIAC RISK FACTORS  Hypertension  Diabetes  2024 A1C 7.5  Dyslipidemia  2024   HDL 37 LDL 51  Obesity  Tobacco Use: Former Smoker  Obstructive Sleep Apnea    NON-CARDIAC  Allergies  Fatigue  Endometriosis  Fatty liver  GERD  Migraines  Iron deficiency anemia  Lung nodule  RLS    SURGERIES  Bladder surgery  Carpal tunnel release  Laparotomy  Lasik  Oophorectomy  Tonsillectomy  Hysterectomy  Uvulectomy      Subjective      Review of Systems:   Review of Systems   Respiratory:  Negative for apnea, cough, choking, chest tightness, shortness of breath, wheezing and stridor.    Cardiovascular:  Positive for leg swelling. Negative for chest pain and palpitations.       Medications:     Current Outpatient Medications:     Aspirin Buf,CaCarb-MgCarb-MgO, 81 MG  tablet, Take 1 tablet by mouth., Disp: , Rfl:     atorvastatin (LIPITOR) 20 MG tablet, Take 1 tablet by mouth Daily., Disp: 90 tablet, Rfl: 3    cetirizine (zyrTEC) 10 MG tablet, Take 1 tablet by mouth Daily., Disp: 90 tablet, Rfl: 3    cyclobenzaprine (FLEXERIL) 10 MG tablet, Take 1 tablet by mouth 2 (Two) Times a Day As Needed for Muscle Spasms., Disp: 180 tablet, Rfl: 3    cycloSPORINE (RESTASIS) 0.05 % ophthalmic emulsion, Administer 1 drop to both eyes 2 (Two) Times a Day., Disp: , Rfl:     EPINEPHrine (EPIPEN) 0.3 MG/0.3ML solution auto-injector injection, epinephrine 0.3 mg/0.3 mL injection, auto-injector, Disp: , Rfl:     ergocalciferol (ERGOCALCIFEROL) 1.25 MG (26378 UT) capsule, Take 1 capsule by mouth 1 (One) Time Per Week., Disp: 8 capsule, Rfl: 0    estradiol (VIVELLE-DOT) 0.1 MG/24HR patch, Place 1 patch on the skin as directed by provider 2 (Two) Times a Week. Please use generic, Disp: 24 each, Rfl: 3    ferrous sulfate 140 (45 Fe) MG tablet controlled-release tablet, Take  by mouth Daily With Breakfast., Disp: , Rfl:     fluticasone (FLONASE) 50 MCG/ACT nasal spray, 2 sprays into the nostril(s) as directed by provider Daily., Disp: 48 g, Rfl: 3    ipratropium (ATROVENT) 0.06 % nasal spray, 2 sprays into the nostril(s) as directed by provider., Disp: , Rfl:     losartan (COZAAR) 100 MG tablet, Take 1 tablet by mouth Daily., Disp: 90 tablet, Rfl: 1    loteprednol etabonate (LOTEMAX) 0.5 % gel ophthalmic gel, As Needed., Disp: , Rfl:     metFORMIN ER (GLUCOPHAGE-XR) 500 MG 24 hr tablet, Take 1 tablet by mouth 2 (Two) Times a Day With Meals., Disp: 180 tablet, Rfl: 3    naproxen (NAPROSYN) 500 MG tablet, Take 1 tablet by mouth 2 (Two) Times a Day As Needed for Moderate Pain (inflammation)., Disp: 180 tablet, Rfl: 1    olopatadine (PATADAY) 0.2 % solution ophthalmic solution, Administer 1 drop to both eyes Daily., Disp: 7.5 mL, Rfl: 3    potassium chloride (KLOR-CON M20) 20 MEQ CR tablet, TAKE 1  BY MOUTH  "ONCE DAILY WITH FOOD, Disp: 90 tablet, Rfl: 0    rOPINIRole (REQUIP) 2 MG tablet, Take 1 tablet by mouth 2 (Two) Times a Day., Disp: 180 tablet, Rfl: 3    Semaglutide, 1 MG/DOSE, (Ozempic, 1 MG/DOSE,) 4 MG/3ML solution pen-injector, Inject 1 mg under the skin into the appropriate area as directed 1 (One) Time Per Week., Disp: 9 mL, Rfl: 1    SUMAtriptan (Imitrex) 20 MG/ACT nasal spray, 1 spray into the nostril(s) as directed by provider Every 2 (Two) Hours As Needed for Migraine., Disp: 1 each, Rfl: 11    torsemide (DEMADEX) 20 MG tablet, TAKE 1 TABLET BY MOUTH ONCE DAILY FOR  BLOOD  PRESSURE  AND  SWELLING.  THIS  REPLACES  HYDROCHLOROTHIAZIDE  25MG, Disp: 90 tablet, Rfl: 1    traZODone (DESYREL) 100 MG tablet, Take 1/2 to 1 po qhs prn insomnia, Disp: 90 tablet, Rfl: 3    spironolactone (ALDACTONE) 25 MG tablet, Take 1 tablet by mouth Daily., Disp: 90 tablet, Rfl: 3    Allergies:   Allergies   Allergen Reactions    Lisinopril Swelling    Ace Inhibitors Swelling     Swelling of lips, patient states her breathing had not been effected        Objective     Physical Exam:  Vitals:    08/15/24 1330   BP: 144/94   BP Location: Right arm   Patient Position: Sitting   Cuff Size: Adult   Pulse: 77   SpO2: 92%   Weight: 89.8 kg (198 lb)   Height: 162.6 cm (64\")     Body mass index is 33.99 kg/m².    Constitutional:       General: Not in acute distress.     Appearance: Healthy appearance. Not in distress.     Neck:     JVP:Not elevated     Carotid artery: Normal    Pulmonary:      Effort: Pulmonary effort is normal.      Breath sounds: Normal breath sounds. No wheezing. No rhonchi. No rales.     Cardiovascular:      Normal rate. Regular rhythm. Normal S1. Normal S2.      Murmurs: There is no significant murmur.      No gallop. No click. No rub.     Abdominal:      General: Bowel sounds are normal.      Palpations: Abdomen is soft.      Tenderness: There is no abdominal tenderness.    Extremities:     Pulses:Normal radial and " pedal pulses     Edema:no edema    Smoking Cessation:   Tobacco Product History : Patient quit smoking long time ago after few pack years     Lab Review:   Lab Results   Component Value Date    GLUCOSE 182 (H) 02/19/2024    BUN 15 02/19/2024    CREATININE 0.63 02/19/2024    EGFRIFNONA 85 09/23/2021    EGFRIFAFRI 103 09/23/2021    BCR 24 (H) 02/19/2024    K 3.9 02/19/2024    CO2 25 02/19/2024    CALCIUM 9.2 02/19/2024    PROTENTOTREF 6.3 02/19/2024    ALBUMIN 3.9 02/19/2024    LABIL2 1.6 02/19/2024    AST 19 02/19/2024    ALT 30 02/19/2024     Lab Results   Component Value Date    WBC 7.3 02/19/2024    HGB 13.7 02/19/2024    HCT 40.8 02/19/2024    MCV 85 02/19/2024     02/19/2024     Lab Results   Component Value Date    TSH 0.970 02/19/2024           ECG 12 Lead    Date/Time: 8/15/2024 2:12 PM  Performed by: Reyes Drake MD    Authorized by: Reyes Drake MD  Comparison: compared with previous ECG from 8/10/2023  Similar to previous ECG  Rhythm: sinus rhythm    Clinical impression: normal ECG           Assessment / Plan      Assessment:   Diagnosis Plan   1. Essential hypertension  Basic Metabolic Panel    ECG 12 Lead      2. Mixed hyperlipidemia        3. Sleep apnea, unspecified type             Plan:  Patient blood pressure has been running high.  We will go ahead and start her on spironolactone 25 mg Monday to Friday.  I have advised her not to take over the weekend.  I have advised her to check her BMP in a week after starting the medications.  I have advised her to use Demadex on an as-needed basis.  She still does not want to have any further stress test for cardiac evaluation.      Follow Up:       Return in about 14 months (around 10/15/2025).    Reyes Drake MD     Skin normal color for race, warm, dry and intact. No evidence of rash.

## 2024-08-15 ENCOUNTER — OFFICE VISIT (OUTPATIENT)
Dept: CARDIOLOGY | Facility: CLINIC | Age: 59
End: 2024-08-15
Payer: COMMERCIAL

## 2024-08-15 VITALS
OXYGEN SATURATION: 92 % | HEART RATE: 77 BPM | DIASTOLIC BLOOD PRESSURE: 94 MMHG | HEIGHT: 64 IN | SYSTOLIC BLOOD PRESSURE: 144 MMHG | WEIGHT: 198 LBS | BODY MASS INDEX: 33.8 KG/M2

## 2024-08-15 DIAGNOSIS — I10 ESSENTIAL HYPERTENSION: Primary | ICD-10-CM

## 2024-08-15 DIAGNOSIS — E78.2 MIXED HYPERLIPIDEMIA: ICD-10-CM

## 2024-08-15 DIAGNOSIS — G47.30 SLEEP APNEA, UNSPECIFIED TYPE: ICD-10-CM

## 2024-08-15 PROCEDURE — 93000 ELECTROCARDIOGRAM COMPLETE: CPT | Performed by: INTERNAL MEDICINE

## 2024-08-15 PROCEDURE — 99214 OFFICE O/P EST MOD 30 MIN: CPT | Performed by: INTERNAL MEDICINE

## 2024-08-15 RX ORDER — SPIRONOLACTONE 25 MG/1
25 TABLET ORAL DAILY
Qty: 90 TABLET | Refills: 3 | Status: SHIPPED | OUTPATIENT
Start: 2024-08-15

## 2024-08-19 RX ORDER — ATORVASTATIN CALCIUM 20 MG/1
20 TABLET, FILM COATED ORAL DAILY
Qty: 90 TABLET | Refills: 0 | Status: SHIPPED | OUTPATIENT
Start: 2024-08-19 | End: 2024-08-26 | Stop reason: SDUPTHER

## 2024-08-26 ENCOUNTER — TELEPHONE (OUTPATIENT)
Dept: FAMILY MEDICINE CLINIC | Facility: CLINIC | Age: 59
End: 2024-08-26

## 2024-08-26 ENCOUNTER — OFFICE VISIT (OUTPATIENT)
Dept: FAMILY MEDICINE CLINIC | Facility: CLINIC | Age: 59
End: 2024-08-26
Payer: COMMERCIAL

## 2024-08-26 VITALS
DIASTOLIC BLOOD PRESSURE: 98 MMHG | HEIGHT: 64 IN | BODY MASS INDEX: 33.36 KG/M2 | SYSTOLIC BLOOD PRESSURE: 142 MMHG | WEIGHT: 195.4 LBS

## 2024-08-26 DIAGNOSIS — D53.9 DEFICIENCY ANEMIA: ICD-10-CM

## 2024-08-26 DIAGNOSIS — E55.9 VITAMIN D DEFICIENCY: ICD-10-CM

## 2024-08-26 DIAGNOSIS — E66.01 MORBID (SEVERE) OBESITY DUE TO EXCESS CALORIES: ICD-10-CM

## 2024-08-26 DIAGNOSIS — G47.33 OBSTRUCTIVE SLEEP APNEA SYNDROME: ICD-10-CM

## 2024-08-26 DIAGNOSIS — K21.9 GASTROESOPHAGEAL REFLUX DISEASE WITHOUT ESOPHAGITIS: ICD-10-CM

## 2024-08-26 DIAGNOSIS — E87.6 HYPOKALEMIA: ICD-10-CM

## 2024-08-26 DIAGNOSIS — G25.81 RESTLESS LEGS: ICD-10-CM

## 2024-08-26 DIAGNOSIS — R53.83 OTHER FATIGUE: ICD-10-CM

## 2024-08-26 DIAGNOSIS — I10 BENIGN ESSENTIAL HYPERTENSION: ICD-10-CM

## 2024-08-26 DIAGNOSIS — E11.42 TYPE 2 DIABETES MELLITUS WITH PERIPHERAL NEUROPATHY: ICD-10-CM

## 2024-08-26 DIAGNOSIS — E78.2 MIXED HYPERLIPIDEMIA: ICD-10-CM

## 2024-08-26 DIAGNOSIS — D51.0 PERNICIOUS ANEMIA: ICD-10-CM

## 2024-08-26 DIAGNOSIS — F51.04 PSYCHOPHYSIOLOGICAL INSOMNIA: ICD-10-CM

## 2024-08-26 DIAGNOSIS — G43.009 MIGRAINE WITHOUT AURA AND WITHOUT STATUS MIGRAINOSUS, NOT INTRACTABLE: ICD-10-CM

## 2024-08-26 DIAGNOSIS — Z00.01 ENCOUNTER FOR GENERAL ADULT MEDICAL EXAMINATION WITH ABNORMAL FINDINGS: Primary | ICD-10-CM

## 2024-08-26 DIAGNOSIS — K76.0 FATTY LIVER: ICD-10-CM

## 2024-08-26 DIAGNOSIS — N20.0 KIDNEY STONES: ICD-10-CM

## 2024-08-26 DIAGNOSIS — Z79.899 ENCOUNTER FOR LONG-TERM (CURRENT) USE OF OTHER MEDICATIONS: ICD-10-CM

## 2024-08-26 DIAGNOSIS — J30.1 NON-SEASONAL ALLERGIC RHINITIS DUE TO POLLEN: ICD-10-CM

## 2024-08-26 PROBLEM — E53.8 B12 DEFICIENCY: Status: ACTIVE | Noted: 2024-08-26

## 2024-08-26 LAB
EXPIRATION DATE: NORMAL
Lab: NORMAL
POC CREATININE URINE: 50
POC MICROALBUMIN URINE: 10

## 2024-08-26 PROCEDURE — 90471 IMMUNIZATION ADMIN: CPT | Performed by: FAMILY MEDICINE

## 2024-08-26 PROCEDURE — 90746 HEPB VACCINE 3 DOSE ADULT IM: CPT | Performed by: FAMILY MEDICINE

## 2024-08-26 PROCEDURE — 82044 UR ALBUMIN SEMIQUANTITATIVE: CPT | Performed by: FAMILY MEDICINE

## 2024-08-26 PROCEDURE — 99396 PREV VISIT EST AGE 40-64: CPT | Performed by: FAMILY MEDICINE

## 2024-08-26 PROCEDURE — 99213 OFFICE O/P EST LOW 20 MIN: CPT | Performed by: FAMILY MEDICINE

## 2024-08-26 RX ORDER — LOSARTAN POTASSIUM 50 MG/1
50 TABLET ORAL DAILY
Qty: 90 TABLET | Refills: 2 | Status: SHIPPED | OUTPATIENT
Start: 2024-08-26

## 2024-08-26 RX ORDER — ROPINIROLE 2 MG/1
2 TABLET, FILM COATED ORAL 2 TIMES DAILY
Qty: 180 TABLET | Refills: 3 | Status: SHIPPED | OUTPATIENT
Start: 2024-08-26

## 2024-08-26 RX ORDER — ATORVASTATIN CALCIUM 20 MG/1
20 TABLET, FILM COATED ORAL DAILY
Qty: 90 TABLET | Refills: 3 | Status: SHIPPED | OUTPATIENT
Start: 2024-08-26

## 2024-08-26 RX ORDER — CETIRIZINE HYDROCHLORIDE 10 MG/1
10 TABLET ORAL DAILY
Qty: 90 TABLET | Refills: 3 | Status: SHIPPED | OUTPATIENT
Start: 2024-08-26

## 2024-08-26 RX ORDER — NAPROXEN 500 MG/1
500 TABLET ORAL 2 TIMES DAILY PRN
Qty: 180 TABLET | Refills: 1 | Status: SHIPPED | OUTPATIENT
Start: 2024-08-26

## 2024-08-26 RX ORDER — SUMATRIPTAN 20 MG/1
1 SPRAY NASAL
Qty: 1 EACH | Refills: 11 | Status: SHIPPED | OUTPATIENT
Start: 2024-08-26

## 2024-08-26 RX ORDER — TRAZODONE HYDROCHLORIDE 100 MG/1
TABLET ORAL
Qty: 90 TABLET | Refills: 3 | Status: SHIPPED | OUTPATIENT
Start: 2024-08-26

## 2024-08-26 RX ORDER — FLUTICASONE PROPIONATE 50 MCG
2 SPRAY, SUSPENSION (ML) NASAL DAILY
Qty: 48 G | Refills: 3 | Status: SHIPPED | OUTPATIENT
Start: 2024-08-26

## 2024-08-26 RX ORDER — OLOPATADINE HYDROCHLORIDE 2 MG/ML
1 SOLUTION/ DROPS OPHTHALMIC DAILY
Qty: 7.5 ML | Refills: 3 | Status: SHIPPED | OUTPATIENT
Start: 2024-08-26

## 2024-08-26 NOTE — PROGRESS NOTES
Chief Complaint  Annual Exam    Subjective      Alia Garza presents to [facility]  History of Present Illness  Patient here for annual exam but she did have a couple of issues to discuss.  First of all, she saw the cardiologist recently, and her understanding was that she was to stop taking losartan 100 mg daily and start spironolactone 25 mg Monday through Friday, and continue taking Lasix and potassium as needed.  Her blood pressure has been remaining elevated, running 130-155/.  I reviewed the cardiology note with the patient and I told her that I do not see any statement about stopping the losartan, so I think there was a misunderstanding, as her blood pressure was running high while taking the losartan.  The patient says she is continue taking potassium every day, although she is only taking the Lasix as needed for swelling, and usually just half a pill a day works.  We discussed possibility that potassium could go too high with a combination of losartan and spironolactone, and therefore agreed that she would start back on losartan 50 mg daily, and we will check labs today.    The patient has been having more symptoms of peripheral neuropathy in her feet lately.  In the past, she had B12 deficiency, but her blood levels had been staying normal over the last few years so she stopped taking that 6 months ago.  She is also been diabetic for the past 2 years but her diabetes has been well-controlled, and is doing even better now that she is on Ozempic.  She does wish to go on up to 2 mg weekly of Ozempic as she is tolerating the 1 mg dose well, but has not lost any additional weight over the past month.  I asked if she felt like the neuropathy symptoms are bad enough to take additional medication for the discomfort and she said probably not at this point, but if it continues then she may change her mind.    I also discussed her restless leg syndrome management, and the fact  "that in more recent years it has been shown that taking medicines like the Requip daily can actually cause the symptoms to worsen over time.  The patient says that she does take the Requip every day, and sometimes has to take an additional dose in the evening when she is sitting and resting, since that is when the symptoms are most bothersome.  However, if she does that then she will not have to take an additional dose when she goes to bed, as she has been sleeping well lately.  She did retire August 1, so has a lot less stress, and has been working on home-improvement projects which have increased her physical activity lately.  Objective   Vital Signs:   Vitals:    08/26/24 1344   BP: 142/98   BP Location: Left arm   Patient Position: Sitting   Cuff Size: Adult   Weight: 88.6 kg (195 lb 6.4 oz)   Height: 162.6 cm (64\")      /98 (BP Location: Left arm, Patient Position: Sitting, Cuff Size: Adult)   Ht 162.6 cm (64\")   Wt 88.6 kg (195 lb 6.4 oz)   BMI 33.54 kg/m²     Body mass index is 33.54 kg/m².    Review of Systems   Constitutional:  Positive for activity change. Negative for appetite change, chills and fever.   HENT:  Negative for nosebleeds, sore throat and trouble swallowing.    Eyes:  Negative for visual disturbance.   Respiratory:  Negative for cough, choking, chest tightness, shortness of breath and wheezing.    Cardiovascular:  Negative for chest pain and palpitations.   Gastrointestinal:  Negative for abdominal pain, blood in stool, constipation, diarrhea, nausea, vomiting and GERD.   Endocrine: Negative for polydipsia, polyphagia and polyuria.   Genitourinary:  Negative for dysuria and hematuria.   Musculoskeletal:  Positive for myalgias. Negative for arthralgias, back pain, gait problem, joint swelling and neck pain.   Skin:  Negative for rash and wound.   Allergic/Immunologic: Positive for environmental allergies.   Neurological:  Positive for numbness and headache. Negative for dizziness, " tremors, seizures, syncope, facial asymmetry, speech difficulty, weakness, light-headedness and memory problem.   Hematological:  Negative for adenopathy.   Psychiatric/Behavioral:  Negative for sleep disturbance, negative for hyperactivity and depressed mood.        Past History:  Medical History: has a past medical history of Abnormal Pap smear of cervix, Allergic (2000), Anemia, Angioedema (2008), AR (allergic rhinitis), Benign essential HTN, Carpal tunnel syndrome, Chronic fatigue syndrome, DM type 2 with diabetic dyslipidemia, Dyspareunia, female, Encounter for long-term (current) use of other medications (08/19/2022), Endometriosis, Fatty liver (2022), GERD (gastroesophageal reflux disease), Headache, High risk medication use, Hyperglycemia, Hyperlipidemia, Hypertension, Iron deficiency anemia, Kidney stone (07/2022), Low iron, Lung nodule seen on imaging study (07/2022), Migraine, Obesity, Ovarian cyst (1995), Pernicious anemia, Polycystic disease, ovaries, Prediabetes, Pregnancy (2001), Recurrent UTI, RLS (restless legs syndrome), Sleep apnea, and Type 2 diabetes mellitus without complication.   Surgical History: has a past surgical history that includes Tonsillectomy; Colonoscopy (2018); Bladder surgery; Exploratory laparotomy; Carpal tunnel release (Right); LASIK (2015); total laparoscopic hysterectomy salpingo oophorectomy (N/A, 09/11/2019); Oophorectomy; Urethral dilation; Uvulectomy; Total abdominal hysterectomy w/ bilateral salpingoophorectomy (09/2019); Laparoscopy; and Eye surgery (2015).   Family History: family history includes ADD / ADHD in her son; Alcohol abuse in her maternal grandfather; Anxiety disorder in her mother; Arthritis in her mother; Breast cancer (age of onset: 39) in her maternal aunt; Breast cancer (age of onset: 92) in her maternal grandmother; COPD in her father; Cancer in her maternal aunt, maternal grandmother, and paternal grandfather; Diabetes in her father, maternal aunt,  maternal aunt, maternal uncle, mother, and other family members; Heart attack in her father; Hyperlipidemia in her father; Hypertension in her father; Migraines in her mother; Other in her maternal grandmother, mother, sister, and sister; Prostate cancer in her paternal grandfather; Thyroid disease in her mother.   Social History: reports that she quit smoking about 34 years ago. Her smoking use included cigarettes. She started smoking about 37 years ago. She has a 1.5 pack-year smoking history. She has never used smokeless tobacco. She reports that she does not drink alcohol and does not use drugs.      Current Outpatient Medications:     Aspirin Buf,CaCarb-MgCarb-MgO, 81 MG tablet, Take 1 tablet by mouth., Disp: , Rfl:     atorvastatin (LIPITOR) 20 MG tablet, Take 1 tablet by mouth Daily., Disp: 90 tablet, Rfl: 3    cetirizine (zyrTEC) 10 MG tablet, Take 1 tablet by mouth Daily., Disp: 90 tablet, Rfl: 3    cyclobenzaprine (FLEXERIL) 10 MG tablet, Take 1 tablet by mouth 2 (Two) Times a Day As Needed for Muscle Spasms., Disp: 180 tablet, Rfl: 3    cycloSPORINE (RESTASIS) 0.05 % ophthalmic emulsion, Administer 1 drop to both eyes 2 (Two) Times a Day., Disp: , Rfl:     EPINEPHrine (EPIPEN) 0.3 MG/0.3ML solution auto-injector injection, epinephrine 0.3 mg/0.3 mL injection, auto-injector, Disp: , Rfl:     estradiol (VIVELLE-DOT) 0.1 MG/24HR patch, Place 1 patch on the skin as directed by provider 2 (Two) Times a Week. Please use generic, Disp: 24 each, Rfl: 3    ferrous sulfate 140 (45 Fe) MG tablet controlled-release tablet, Take  by mouth Daily With Breakfast., Disp: , Rfl:     fluticasone (FLONASE) 50 MCG/ACT nasal spray, 2 sprays into the nostril(s) as directed by provider Daily., Disp: 48 g, Rfl: 3    ipratropium (ATROVENT) 0.06 % nasal spray, 2 sprays into the nostril(s) as directed by provider., Disp: , Rfl:     loteprednol etabonate (LOTEMAX) 0.5 % gel ophthalmic gel, As Needed., Disp: , Rfl:     metFORMIN ER  (GLUCOPHAGE-XR) 500 MG 24 hr tablet, Take 1 tablet by mouth 2 (Two) Times a Day With Meals., Disp: 180 tablet, Rfl: 3    naproxen (NAPROSYN) 500 MG tablet, Take 1 tablet by mouth 2 (Two) Times a Day As Needed for Moderate Pain (inflammation)., Disp: 180 tablet, Rfl: 1    olopatadine (PATADAY) 0.2 % solution ophthalmic solution, Administer 1 drop to both eyes Daily., Disp: 7.5 mL, Rfl: 3    potassium chloride (KLOR-CON M20) 20 MEQ CR tablet, TAKE 1  BY MOUTH ONCE DAILY WITH FOOD, Disp: 90 tablet, Rfl: 0    rOPINIRole (REQUIP) 2 MG tablet, Take 1 tablet by mouth 2 (Two) Times a Day., Disp: 180 tablet, Rfl: 3    spironolactone (ALDACTONE) 25 MG tablet, Take 1 tablet by mouth Daily., Disp: 90 tablet, Rfl: 3    SUMAtriptan (Imitrex) 20 MG/ACT nasal spray, 1 spray into the nostril(s) as directed by provider Every 2 (Two) Hours As Needed for Migraine., Disp: 1 each, Rfl: 11    torsemide (DEMADEX) 20 MG tablet, TAKE 1 TABLET BY MOUTH ONCE DAILY FOR  BLOOD  PRESSURE  AND  SWELLING.  THIS  REPLACES  HYDROCHLOROTHIAZIDE  25MG, Disp: 90 tablet, Rfl: 1    traZODone (DESYREL) 100 MG tablet, Take 1/2 to 1 po qhs prn insomnia, Disp: 90 tablet, Rfl: 3    Cholecalciferol (Vitamin D-3) 125 MCG (5000 UT) tablet, Take 1 tablet by mouth Every Other Day., Disp: 1 tablet, Rfl: 0    losartan (Cozaar) 50 MG tablet, Take 1 tablet by mouth Daily., Disp: 90 tablet, Rfl: 2    Semaglutide, 2 MG/DOSE, (OZEMPIC) 8 MG/3ML solution pen-injector, Inject 2 mg under the skin into the appropriate area as directed 1 (One) Time Per Week., Disp: 9 mL, Rfl: 3    Allergies: Lisinopril and Ace inhibitors    Physical Exam  Constitutional:       General: She is not in acute distress.     Appearance: She is obese. She is not toxic-appearing.   HENT:      Head: Normocephalic and atraumatic.      Right Ear: Ear canal and external ear normal.      Left Ear: Ear canal and external ear normal.      Nose: Nose normal.      Mouth/Throat:      Mouth: Mucous membranes are  moist.      Pharynx: Oropharynx is clear.   Eyes:      General: No scleral icterus.     Extraocular Movements: Extraocular movements intact.      Conjunctiva/sclera: Conjunctivae normal.      Pupils: Pupils are equal, round, and reactive to light.   Neck:      Vascular: No carotid bruit.   Cardiovascular:      Rate and Rhythm: Normal rate and regular rhythm.      Pulses: Normal pulses.           Dorsalis pedis pulses are 2+ on the right side and 2+ on the left side.        Posterior tibial pulses are 2+ on the right side and 2+ on the left side.      Heart sounds: Normal heart sounds.   Pulmonary:      Effort: Pulmonary effort is normal.      Breath sounds: Normal breath sounds.   Chest:      Chest wall: No tenderness.   Abdominal:      General: Bowel sounds are normal. There is no distension.      Palpations: Abdomen is soft.      Tenderness: There is no abdominal tenderness. There is no guarding or rebound.   Musculoskeletal:         General: No swelling or deformity. Normal range of motion.      Cervical back: Normal range of motion. No rigidity.      Right lower leg: No edema.      Left lower leg: No edema.      Right foot: No deformity.      Left foot: No deformity.   Feet:      Right foot:      Protective Sensation: 5 sites tested.  5 sites sensed.      Skin integrity: Skin integrity normal.      Toenail Condition: Right toenails are normal.      Left foot:      Protective Sensation: 5 sites tested.  5 sites sensed.      Skin integrity: Skin integrity normal.      Toenail Condition: Left toenails are normal.      Comments:      Lymphadenopathy:      Cervical: No cervical adenopathy.   Skin:     General: Skin is warm and dry.      Capillary Refill: Capillary refill takes less than 2 seconds.      Coloration: Skin is not pale.      Findings: No erythema or rash.   Neurological:      General: No focal deficit present.      Mental Status: She is alert and oriented to person, place, and time.      Cranial Nerves:  No cranial nerve deficit.      Motor: No weakness.      Coordination: Coordination normal.      Gait: Gait normal.   Psychiatric:         Mood and Affect: Mood normal.         Behavior: Behavior normal.         Judgment: Judgment normal.                   Assessment and Plan   Diagnoses and all orders for this visit:    1. Encounter for general adult medical examination with abnormal findings (Primary)  -     CBC & Differential  -     Comprehensive Metabolic Panel  -     Hemoglobin A1c  -     Lipid Panel  Healthy lifestyle measures including healthy diet regular exercise and weight reduction were discussed.  Preventive healthcare measures were also discussed.  Patient has been for her diabetic eye exam and we will get records and update her care gaps.  She was encouraged to get her COVID booster and her flu vaccine when available.  2. Type 2 diabetes mellitus with peripheral neuropathy  -     POC Microalbumin  Well-controlled with metformin extended release, 500 milligram twice a day which is the maximum tolerated dose for her, and Ozempic 1 mg weekly but she would like to increase the dose of Ozempic in hopes of losing additional weight  3. Benign essential hypertension  Patient will go back on losartan but at 50 mg dose, continue spironolactone but advised to take it every day since she is taking it for hypertension and to help with edema, and advised to only take the potassium if she takes the Lasix for swelling.  If her blood pressure does not come down into the desirable range in the next 2 to 3 weeks and she will go back to 100 mg daily of losartan, and if so, we will recheck electrolytes after about a month  4. Pernicious anemia  -     Folate  -     Vitamin B12  Patient has a history of this but her B12 level has been fine the last 2 years so she did stop taking her B12 in February.  Therefore she is at risk for recurrence of B12 deficiency, and we will check levels today  5. Obstructive sleep apnea  syndrome    Patient is using her CPAP and benefits from it.  6. Encounter for long-term (current) use of other medications  -magnesium  7. Mixed hyperlipidemia    8. Gastroesophageal reflux disease without esophagitis    9. Psychophysiological insomnia    10. Hypokalemia    11. Fatty liver  Patient did ask about this today and we did discuss that, but advised her that she is already doing all the right things to help with this, although she did need to get her second and third hepatitis B vaccines. We gave her 2nd one today, and can give 3rd at next appt. She did get 2 doses of hepatitis A vaccine six mos apart in 2018  12. Kidney stones  Fortunately has not had a recurrence recently  13. 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 Migraine without aura and without status migrainosus, not intractable    15. Restless legs  Patient will try to reduce the amount of Requip that she needs to take nightly, but if she is not able to lower the dose, and she will continue what she is taking now.  We discussed the possibility of trying gabapentin since it can also help with the neuropathy symptoms, but the patient says that she is not having trouble sleeping, and mostly has trouble in the evening with her restless leg syndrome symptoms when she is at rest watching television or reading.  Therefore we are holding off on the gabapentin for now  16. Other fatigue  -     TSH    17. Deficiency anemia  -     Ferritin  -     Iron  Has a history of iron deficiency in the past but has been better the last couple of years, but we will continue to monitor  18. Vitamin D deficiency  -     Vitamin D,25-Hydroxy    19. Non-seasonal allergic rhinitis due to pollen    Other orders  -     Hepatitis B Vaccine Adult IM (ENGERIX/RECOMBIVAX)  -     losartan  (Cozaar) 50 MG tablet; Take 1 tablet by mouth Daily.  Dispense: 90 tablet; Refill: 2  -     Discontinue: Semaglutide, 2 MG/DOSE, (OZEMPIC) 8 MG/3ML solution pen-injector; Inject 2 mg under the skin into the appropriate area as directed 1 (One) Time Per Week.  Dispense: 9 mL; Refill: 3  -     Cholecalciferol (Vitamin D-3) 125 MCG (5000 UT) tablet; Take 1 tablet by mouth Every Other Day.  Dispense: 1 tablet; Refill: 0  -     atorvastatin (LIPITOR) 20 MG tablet; Take 1 tablet by mouth Daily.  Dispense: 90 tablet; Refill: 3  -     cetirizine (zyrTEC) 10 MG tablet; Take 1 tablet by mouth Daily.  Dispense: 90 tablet; Refill: 3  -     fluticasone (FLONASE) 50 MCG/ACT nasal spray; 2 sprays into the nostril(s) as directed by provider Daily.  Dispense: 48 g; Refill: 3  -     naproxen (NAPROSYN) 500 MG tablet; Take 1 tablet by mouth 2 (Two) Times a Day As Needed for Moderate Pain (inflammation).  Dispense: 180 tablet; Refill: 1  -     olopatadine (PATADAY) 0.2 % solution ophthalmic solution; Administer 1 drop to both eyes Daily.  Dispense: 7.5 mL; Refill: 3  -     rOPINIRole (REQUIP) 2 MG tablet; Take 1 tablet by mouth 2 (Two) Times a Day.  Dispense: 180 tablet; Refill: 3  -     SUMAtriptan (Imitrex) 20 MG/ACT nasal spray; 1 spray into the nostril(s) as directed by provider Every 2 (Two) Hours As Needed for Migraine.  Dispense: 1 each; Refill: 11  -     traZODone (DESYREL) 100 MG tablet; Take 1/2 to 1 po qhs prn insomnia  Dispense: 90 tablet; Refill: 3            Follow Up   Return in about 6 months (around 2/26/2025) for Recheck.  Patient was given instructions and counseling regarding her condition or for health maintenance advice. Please see specific information pulled into the AVS if appropriate.     Jhon Edmond MD

## 2024-08-27 LAB
25(OH)D3+25(OH)D2 SERPL-MCNC: 81.9 NG/ML (ref 30–100)
ALBUMIN SERPL-MCNC: 4.3 G/DL (ref 3.8–4.9)
ALP SERPL-CCNC: 145 IU/L (ref 44–121)
ALT SERPL-CCNC: 27 IU/L (ref 0–32)
AST SERPL-CCNC: 22 IU/L (ref 0–40)
BASOPHILS # BLD AUTO: 0 X10E3/UL (ref 0–0.2)
BASOPHILS NFR BLD AUTO: 0 %
BILIRUB SERPL-MCNC: 0.4 MG/DL (ref 0–1.2)
BUN SERPL-MCNC: 16 MG/DL (ref 6–24)
BUN/CREAT SERPL: 22 (ref 9–23)
CALCIUM SERPL-MCNC: 9.6 MG/DL (ref 8.7–10.2)
CHLORIDE SERPL-SCNC: 99 MMOL/L (ref 96–106)
CHOLEST SERPL-MCNC: 102 MG/DL (ref 100–199)
CO2 SERPL-SCNC: 26 MMOL/L (ref 20–29)
CREAT SERPL-MCNC: 0.74 MG/DL (ref 0.57–1)
EGFRCR SERPLBLD CKD-EPI 2021: 95 ML/MIN/1.73
EOSINOPHIL # BLD AUTO: 0.1 X10E3/UL (ref 0–0.4)
EOSINOPHIL NFR BLD AUTO: 1 %
ERYTHROCYTE [DISTWIDTH] IN BLOOD BY AUTOMATED COUNT: 12.4 % (ref 11.7–15.4)
FERRITIN SERPL-MCNC: 99 NG/ML (ref 15–150)
FOLATE SERPL-MCNC: 5.2 NG/ML
GLOBULIN SER CALC-MCNC: 2.8 G/DL (ref 1.5–4.5)
GLUCOSE SERPL-MCNC: 100 MG/DL (ref 70–99)
HBA1C MFR BLD: 5.6 % (ref 4.8–5.6)
HCT VFR BLD AUTO: 45.8 % (ref 34–46.6)
HDLC SERPL-MCNC: 42 MG/DL
HGB BLD-MCNC: 15 G/DL (ref 11.1–15.9)
IMM GRANULOCYTES # BLD AUTO: 0 X10E3/UL (ref 0–0.1)
IMM GRANULOCYTES NFR BLD AUTO: 0 %
IRON SERPL-MCNC: 49 UG/DL (ref 27–159)
LDLC SERPL CALC-MCNC: 40 MG/DL (ref 0–99)
LYMPHOCYTES # BLD AUTO: 2.1 X10E3/UL (ref 0.7–3.1)
LYMPHOCYTES NFR BLD AUTO: 22 %
MAGNESIUM SERPL-MCNC: 2 MG/DL (ref 1.6–2.3)
MCH RBC QN AUTO: 29.3 PG (ref 26.6–33)
MCHC RBC AUTO-ENTMCNC: 32.8 G/DL (ref 31.5–35.7)
MCV RBC AUTO: 90 FL (ref 79–97)
MONOCYTES # BLD AUTO: 0.7 X10E3/UL (ref 0.1–0.9)
MONOCYTES NFR BLD AUTO: 7 %
NEUTROPHILS # BLD AUTO: 6.4 X10E3/UL (ref 1.4–7)
NEUTROPHILS NFR BLD AUTO: 70 %
PLATELET # BLD AUTO: 319 X10E3/UL (ref 150–450)
POTASSIUM SERPL-SCNC: 4.1 MMOL/L (ref 3.5–5.2)
PROT SERPL-MCNC: 7.1 G/DL (ref 6–8.5)
RBC # BLD AUTO: 5.12 X10E6/UL (ref 3.77–5.28)
SODIUM SERPL-SCNC: 142 MMOL/L (ref 134–144)
TRIGL SERPL-MCNC: 108 MG/DL (ref 0–149)
TSH SERPL DL<=0.005 MIU/L-ACNC: 1.04 UIU/ML (ref 0.45–4.5)
VIT B12 SERPL-MCNC: 535 PG/ML (ref 232–1245)
VLDLC SERPL CALC-MCNC: 20 MG/DL (ref 5–40)
WBC # BLD AUTO: 9.3 X10E3/UL (ref 3.4–10.8)

## 2024-09-04 DIAGNOSIS — G62.9 PERIPHERAL POLYNEUROPATHY: Primary | ICD-10-CM

## 2024-09-10 RX ORDER — POTASSIUM CHLORIDE 1500 MG/1
20 TABLET, EXTENDED RELEASE ORAL DAILY
Qty: 90 TABLET | Refills: 3 | Status: SHIPPED | OUTPATIENT
Start: 2024-09-10

## 2024-11-15 RX ORDER — LOSARTAN POTASSIUM 100 MG/1
100 TABLET ORAL DAILY
Qty: 90 TABLET | Refills: 0 | OUTPATIENT
Start: 2024-11-15

## 2024-12-17 ENCOUNTER — LAB (OUTPATIENT)
Dept: LAB | Facility: HOSPITAL | Age: 59
End: 2024-12-17
Payer: COMMERCIAL

## 2024-12-17 ENCOUNTER — OFFICE VISIT (OUTPATIENT)
Dept: NEUROLOGY | Facility: CLINIC | Age: 59
End: 2024-12-17
Payer: COMMERCIAL

## 2024-12-17 VITALS
OXYGEN SATURATION: 96 % | SYSTOLIC BLOOD PRESSURE: 110 MMHG | HEIGHT: 64 IN | DIASTOLIC BLOOD PRESSURE: 70 MMHG | BODY MASS INDEX: 31.58 KG/M2 | WEIGHT: 185 LBS | HEART RATE: 86 BPM

## 2024-12-17 DIAGNOSIS — G89.29 CHRONIC MIDLINE LOW BACK PAIN WITH RIGHT-SIDED SCIATICA: ICD-10-CM

## 2024-12-17 DIAGNOSIS — I10 ESSENTIAL HYPERTENSION: ICD-10-CM

## 2024-12-17 DIAGNOSIS — R20.2 NUMBNESS AND TINGLING OF BOTH FEET: ICD-10-CM

## 2024-12-17 DIAGNOSIS — M54.41 CHRONIC MIDLINE LOW BACK PAIN WITH RIGHT-SIDED SCIATICA: ICD-10-CM

## 2024-12-17 DIAGNOSIS — R20.0 NUMBNESS AND TINGLING OF BOTH FEET: ICD-10-CM

## 2024-12-17 DIAGNOSIS — R20.0 NUMBNESS AND TINGLING OF BOTH FEET: Primary | ICD-10-CM

## 2024-12-17 DIAGNOSIS — R20.2 NUMBNESS AND TINGLING OF BOTH FEET: Primary | ICD-10-CM

## 2024-12-17 LAB
ANION GAP SERPL CALCULATED.3IONS-SCNC: 12.1 MMOL/L (ref 5–15)
BUN SERPL-MCNC: 15 MG/DL (ref 6–20)
BUN/CREAT SERPL: 21.1 (ref 7–25)
CALCIUM SPEC-SCNC: 9.2 MG/DL (ref 8.6–10.5)
CHLORIDE SERPL-SCNC: 106 MMOL/L (ref 98–107)
CO2 SERPL-SCNC: 24.9 MMOL/L (ref 22–29)
CREAT SERPL-MCNC: 0.71 MG/DL (ref 0.57–1)
EGFRCR SERPLBLD CKD-EPI 2021: 99.3 ML/MIN/1.73
GLUCOSE SERPL-MCNC: 84 MG/DL (ref 65–99)
POTASSIUM SERPL-SCNC: 4 MMOL/L (ref 3.5–5.2)
SODIUM SERPL-SCNC: 143 MMOL/L (ref 136–145)

## 2024-12-17 PROCEDURE — 82784 ASSAY IGA/IGD/IGG/IGM EACH: CPT

## 2024-12-17 PROCEDURE — 84207 ASSAY OF VITAMIN B-6: CPT

## 2024-12-17 PROCEDURE — 84155 ASSAY OF PROTEIN SERUM: CPT

## 2024-12-17 PROCEDURE — 36415 COLL VENOUS BLD VENIPUNCTURE: CPT

## 2024-12-17 PROCEDURE — 84165 PROTEIN E-PHORESIS SERUM: CPT

## 2024-12-17 PROCEDURE — 86038 ANTINUCLEAR ANTIBODIES: CPT

## 2024-12-17 PROCEDURE — 80048 BASIC METABOLIC PNL TOTAL CA: CPT

## 2024-12-17 PROCEDURE — 86334 IMMUNOFIX E-PHORESIS SERUM: CPT

## 2024-12-17 PROCEDURE — 83921 ORGANIC ACID SINGLE QUANT: CPT

## 2024-12-17 RX ORDER — PERFLUOROHEXYLOCTANE 1 MG/MG
SOLUTION OPHTHALMIC
COMMUNITY
Start: 2024-10-17

## 2024-12-17 NOTE — PROGRESS NOTES
Neuro Office Visit      Encounter Date: 2024   Patient Name: Alia Garza  : 1965   MRN: [id number]   PCP:  Jhon Edmond MD     Chief Complaint:    Chief Complaint   Patient presents with    peripheral polyneuropathy       History of Present Illness: Alia Garza is a 59 y.o. female who is here today in Neurology for  peripheral polyneuropathy    PMH of allergic rhinitis, HTN, headaches, migraine, pernicious anemia, RLS, sleep apnea, T2DM with peripheral neuropathy, mixed hyperlipidemia, GERD, kidney stones, morbid obesity, vitamin d deficiency, b12 deficiency    Lab per PCP from 2024 showed vitamin B12 of 535, A1c was 5.6 (prior to that it was 7.5 on 2024), folate 5.2    In clinic today Alia notes numbness in her toes, when she starts ambulating she will feel pain in her feet, she also notes low back pain, she reports concern for right sciatic nerve pain, she notes that sitting a certain way can trigger the pain. Numbness in her feet has been present for at least 6 months, sciatic nerve pain has been going on intermittently for about 6 months. She describes the numbness in her feet as a pins/needles sensation. Does not keep her awake at night. She also notes that rubbing her feet helps with pain on the side of her feet. She reports heavier alcohol use in her 20's. She reports that low back pain is worse when standing for long periods and when walking. She notes a brusied area on her low back that has been present for a long time, no known injury.     She also has RLS for which she takes Requip 2 mg, she takes it BID.   Rarely takes Trazodone.   She rarely takes Flexeril and will sometimes take Naproxen for low back pain.     No weakness, no falls, notes occasionally she can get off balance  No numbness/tingling in her hands.    Subjective      Review of Systems   Constitutional: Negative.    HENT: Negative.     Eyes: Negative.    Respiratory: Negative.     Cardiovascular: Negative.     Gastrointestinal: Negative.    Genitourinary:         Notes mild intermittent urinary incontinence   Musculoskeletal:  Positive for back pain.   Skin:         Notes old bruised appearing area on lower back without known injury   Neurological:  Positive for numbness. Negative for weakness.   Psychiatric/Behavioral:  Negative for sleep disturbance.           Past Medical History:   Past Medical History:   Diagnosis Date    Abnormal Pap smear of cervix     Allergic 2000    Anemia     PRENICIOUS    Angioedema 2008    FROM LISINOPRIL    AR (allergic rhinitis)     Benign essential HTN     Carpal tunnel syndrome     Chronic fatigue syndrome     DM type 2 with diabetic dyslipidemia     Dyspareunia, female     Encounter for long-term (current) use of other medications 08/19/2022    Endometriosis     Fatty liver 2022    Noted on CT scan    GERD (gastroesophageal reflux disease)     Headache     Headache, tension-type     High risk medication use     Hyperglycemia     Hyperlipidemia     ASSOCIATED WITH TYPE 2 DIABETES MELLITUS    Hypertension     Iron deficiency anemia     Kidney stone 07/2022    Nonobstructive, seen on the left on CT scan of chest    Low iron     Lung nodule seen on imaging study 07/2022    Stable from 6 months prior, follow-up recommended in 1 year    Migraine     Obesity     Ovarian cyst 1995    Pernicious anemia     Polycystic disease, ovaries     Prediabetes     diet controlled    Pregnancy 2001    VAGINAL FORCEPS    Recurrent UTI     RLS (restless legs syndrome)     Sleep apnea     Type 2 diabetes mellitus without complication        Past Surgical History:   Past Surgical History:   Procedure Laterality Date    BLADDER SURGERY      bladder rectum tacted     CARPAL TUNNEL RELEASE Right     COLONOSCOPY  2018    DIAGNOSTIC LAPAROSCOPY      EXPLORATORY LAPAROTOMY      x2 endo    EYE SURGERY  2015    LASIK  2015    OOPHORECTOMY      [facility]/BSO 2019 for endo    TONSILLECTOMY      TOTAL ABDOMINAL HYSTERECTOMY  WITH SALPINGO OOPHORECTOMY  2019    TOTAL LAPAROSCOPIC HYSTERECTOMY SALPINGO OOPHORECTOMY N/A 2019    Procedure: TOTAL LAPAROSCOPIC HYSTERECTOMY BILATERAL SALPINGECTOMY, OOPHORECTOMY, CYSTOSCOPY;  Surgeon: Faye Mac MD;  Location: [facility];  Service: Obstetrics/Gynecology    URETHRAL DILATION      x3-4    UVULECTOMY         Family History:   Family History   Problem Relation Age of Onset    Migraines Mother     Thyroid disease Mother     Anxiety disorder Mother     Arthritis Mother     Diabetes Mother     Other Mother         Osteoporosis, Migraines    Hypertension Father     Hyperlipidemia Father     Heart attack Father     COPD Father     Diabetes Father     Breast cancer Maternal Grandmother 92    Cancer Maternal Grandmother         Breast cancer    Other Maternal Grandmother         Osteoporosis    Alcohol abuse Maternal Grandfather     Prostate cancer Paternal Grandfather     Cancer Paternal Grandfather         Prostate cancer    Breast cancer Maternal Aunt 39    Diabetes Other     Diabetes Other     ADD / ADHD Son     Cancer Maternal Aunt         breast cancer    Diabetes Maternal Aunt     Diabetes Maternal Aunt     Diabetes Maternal Uncle     Other Sister         Gallbladder/Gall stones, Migraines    Other Sister         Migraines    Ovarian cancer Neg Hx     Endometrial cancer Neg Hx     Uterine cancer Neg Hx     Colon cancer Neg Hx        Social History:   Social History     Socioeconomic History    Marital status:    Tobacco Use    Smoking status: Former     Current packs/day: 0.00     Average packs/day: 0.7 packs/day for 4.5 years (3.0 ttl pk-yrs)     Types: Cigarettes     Start date: 9/10/1986     Quit date: 9/10/1989     Years since quittin.2     Passive exposure: Past    Smokeless tobacco: Never    Tobacco comments:     No longer smoke   Vaping Use    Vaping status: Never Used   Substance and Sexual Activity    Alcohol use: No    Drug use: No    Sexual activity: Yes      Partners: Male     Birth control/protection: None, Hysterectomy       Medications:     Current Outpatient Medications:     Aspirin Buf,CaCarb-MgCarb-MgO, 81 MG tablet, Take 1 tablet by mouth., Disp: , Rfl:     atorvastatin (LIPITOR) 20 MG tablet, Take 1 tablet by mouth Daily., Disp: 90 tablet, Rfl: 3    cetirizine (zyrTEC) 10 MG tablet, Take 1 tablet by mouth Daily., Disp: 90 tablet, Rfl: 3    Cholecalciferol (Vitamin D-3) 125 MCG (5000 UT) tablet, Take 1 tablet by mouth Every Other Day., Disp: 1 tablet, Rfl: 0    cyclobenzaprine (FLEXERIL) 10 MG tablet, Take 1 tablet by mouth 2 (Two) Times a Day As Needed for Muscle Spasms., Disp: 180 tablet, Rfl: 3    cycloSPORINE (RESTASIS) 0.05 % ophthalmic emulsion, Administer 1 drop to both eyes 2 (Two) Times a Day., Disp: , Rfl:     EPINEPHrine (EPIPEN) 0.3 MG/0.3ML solution auto-injector injection, epinephrine 0.3 mg/0.3 mL injection, auto-injector, Disp: , Rfl:     estradiol (VIVELLE-DOT) 0.1 MG/24HR patch, Place 1 patch on the skin as directed by provider 2 (Two) Times a Week. Please use generic, Disp: 24 each, Rfl: 3    ferrous sulfate 140 (45 Fe) MG tablet controlled-release tablet, Take  by mouth Daily With Breakfast., Disp: , Rfl:     fluticasone (FLONASE) 50 MCG/ACT nasal spray, 2 sprays into the nostril(s) as directed by provider Daily., Disp: 48 g, Rfl: 3    ipratropium (ATROVENT) 0.06 % nasal spray, Administer 2 sprays into the nostril(s) as directed by provider., Disp: , Rfl:     losartan (Cozaar) 50 MG tablet, Take 1 tablet by mouth Daily., Disp: 90 tablet, Rfl: 2    loteprednol etabonate (LOTEMAX) 0.5 % gel ophthalmic gel, As Needed., Disp: , Rfl:     metFORMIN ER (GLUCOPHAGE-XR) 500 MG 24 hr tablet, Take 1 tablet by mouth 2 (Two) Times a Day With Meals., Disp: 180 tablet, Rfl: 3    Miebo 1.338 GM/ML solution, , Disp: , Rfl:     naproxen (NAPROSYN) 500 MG tablet, Take 1 tablet by mouth 2 (Two) Times a Day As Needed for Moderate Pain (inflammation)., Disp: 180  "tablet, Rfl: 1    NON FORMULARY, Allergy shot weekly, Disp: , Rfl:     olopatadine (PATADAY) 0.2 % solution ophthalmic solution, Administer 1 drop to both eyes Daily., Disp: 7.5 mL, Rfl: 3    potassium chloride (KLOR-CON M20) 20 MEQ CR tablet, Take 1 tablet by mouth Daily., Disp: 90 tablet, Rfl: 3    rOPINIRole (REQUIP) 2 MG tablet, Take 1 tablet by mouth 2 (Two) Times a Day., Disp: 180 tablet, Rfl: 3    Semaglutide, 2 MG/DOSE, (OZEMPIC) 8 MG/3ML solution pen-injector, Inject 2 mg under the skin into the appropriate area as directed 1 (One) Time Per Week., Disp: 9 mL, Rfl: 3    spironolactone (ALDACTONE) 25 MG tablet, Take 1 tablet by mouth Daily., Disp: 90 tablet, Rfl: 3    SUMAtriptan (Imitrex) 20 MG/ACT nasal spray, 1 spray into the nostril(s) as directed by provider Every 2 (Two) Hours As Needed for Migraine., Disp: 1 each, Rfl: 11    torsemide (DEMADEX) 20 MG tablet, TAKE 1 TABLET BY MOUTH ONCE DAILY FOR  BLOOD  PRESSURE  AND  SWELLING.  THIS  REPLACES  HYDROCHLOROTHIAZIDE  25MG, Disp: 90 tablet, Rfl: 1    traZODone (DESYREL) 100 MG tablet, Take 1/2 to 1 po qhs prn insomnia, Disp: 90 tablet, Rfl: 3    Allergies:   Allergies   Allergen Reactions    Lisinopril Swelling    Ace Inhibitors Swelling     Swelling of lips, patient states her breathing had not been effected          Objective     Objective:    /70   Pulse 86   Ht 162.6 cm (64\")   Wt 83.9 kg (185 lb)   [facility] 07/18/2019   SpO2 96%   BMI 31.76 kg/m²   Body mass index is 31.76 kg/m².    Physical Exam  Vitals reviewed.   Constitutional:       Appearance: Normal appearance.   HENT:      Head: Normocephalic and atraumatic.      Mouth/Throat:      Mouth: Mucous membranes are moist.      Pharynx: Oropharynx is clear.   Pulmonary:      Effort: Pulmonary effort is normal. No respiratory distress.   Skin:     General: Skin is warm and dry.   Neurological:      Mental Status: She is alert.          Neurology Exam:    General apperance: NAD.     Mental " status: Alert, awake and oriented to time place and person.    Fund of knowledge:  Normal.     Language and Speech: No aphasia or dysarthria.    Naming , Repetition and Comprehension:  Can name objects, repeat a sentence and follow commands. Speech is clear and fluent with good repetition, comprehension, and naming.    Cranial Nerves:   CN II: Visual fields are full. Intact. Pupils - PERRLA  CN III, IV and VI: Extraocular movements are intact. Normal saccades.   CN V: Facial sensation is intact.   CN VII: Muscles of facial expression reveal no asymmetry. Intact.   CN VIII: Hearing is intact.   CN IX and X: Palate elevates symmetrically. Intact  CN XI: Shoulder shrug is intact.   CN XII: Tongue is midline without evidence of atrophy or fasciculation.     Motor:  Right UE muscle strength 5/5. Normal tone.     Left UE muscle strength 5/5. Normal tone.      Right LE muscle strength 5/5. Normal tone.     Left LE muscle strength 5/5. Normal tone.      Sensory: Decreased sensation to light touch in bilateral feet at level of toes    DTRs: 2+ bilaterally in upper and lower extremities.    Babinski: Negative bilaterally.    Coordination: Normal finger-to-nose    Romberg: Negative.    Gait: Normal. No assistive device          Results:   Imaging:   No Images in the past 120 days found..     Labs:       Assessment / Plan      Assessment/Plan:   Diagnoses and all orders for this visit:    1. Numbness and tingling of both feet (Primary)  -     ELICIA by IFA, Reflex 9-biomarkers profile; Future  -     JOSE MARIA + PE; Future  -     Vitamin B6; Future  -     Methylmalonic Acid, Serum; Future  -     EMG & Nerve Conduction Test; Future    2. Chronic midline low back pain with right-sided sciatica  -     MRI Lumbar Spine Without Contrast; Future       Alia Garza is in neurology clinic to establish care for numbness/tingling in her feet. Of note she does have Type 2 diabetes, last A1c was 5.6 on 8/26/2024, prior to that it was 7.5 on 2/19/2024.  She was referred from PCP for additional neuropathy workup. We discussed that goal A1c <7 to help minimize progression of diabetic neuropathy. Will also check additional lab work today as above. I have also ordered EMG to confirm neuropathy and MRI lumbar spine to further evaluate her report of chronic low back pain with intermittent right sided sciatica, we discussed that pending result of MRI imaging she may benefit from PT referral. We also discussed treatment options for nerve pain, she would prefer to stay away from controlled substances such as Gabapentin or Lyrica, we discussed that another option would be Cymbalta. She did not wish to start any new medications at this time but will consider pending workup. Will plan to see Alia alva in clinic in 8 weeks or sooner for any questions or concerns.     Patient Education:       Reviewed medications, potential side effects and signs and symptoms to report. Discussed risk versus benefits of treatment plan with patient and/or family-including medications, labs and radiology that may be ordered. Addressed questions and concerns during visit. Patient and/or family verbalized understanding and agree with plan. Instructed to call the office with any questions and report to ER with any life-threatening symptoms.     Follow Up:   Return in about 8 weeks (around 2/11/2025).    I spent 45 minutes in the care of this patient. I personally spent 50 percent of this time counseling and discussing diagnostic testing, evaluation, treatment options, and management .       During this visit the following were done:  Labs Reviewed [x]    Labs Ordered [x]    Radiology Reports Reviewed []    Radiology Ordered [x]    PCP Records Reviewed [x]    Referring Provider Records Reviewed []    ER Records Reviewed []    Hospital Records Reviewed []    History Obtained From Family []    Radiology Images Reviewed []    Other Reviewed []    Records Requested []      KRISTINE Bernard  NEURO CENTER [facility]  [address]  [facility] [address] [phone]

## 2024-12-18 LAB
ANA SER QL IF: NEGATIVE
LABORATORY COMMENT REPORT: NORMAL

## 2024-12-19 ENCOUNTER — TELEPHONE (OUTPATIENT)
Dept: NEUROLOGY | Facility: CLINIC | Age: 59
End: 2024-12-19
Payer: COMMERCIAL

## 2024-12-19 LAB
ALBUMIN SERPL ELPH-MCNC: 3.2 G/DL (ref 2.9–4.4)
ALBUMIN/GLOB SERPL: 1.1 {RATIO} (ref 0.7–1.7)
ALPHA1 GLOB SERPL ELPH-MCNC: 0.3 G/DL (ref 0–0.4)
ALPHA2 GLOB SERPL ELPH-MCNC: 0.8 G/DL (ref 0.4–1)
B-GLOBULIN SERPL ELPH-MCNC: 1 G/DL (ref 0.7–1.3)
GAMMA GLOB SERPL ELPH-MCNC: 1.2 G/DL (ref 0.4–1.8)
GLOBULIN SER-MCNC: 3.2 G/DL (ref 2.2–3.9)
IGA SERPL-MCNC: 213 MG/DL (ref 87–352)
IGG SERPL-MCNC: 1030 MG/DL (ref 586–1602)
IGM SERPL-MCNC: 110 MG/DL (ref 26–217)
INTERPRETATION SERPL IEP-IMP: NORMAL
LABORATORY COMMENT REPORT: NORMAL
M PROTEIN SERPL ELPH-MCNC: NORMAL G/DL
PROT SERPL-MCNC: 6.4 G/DL (ref 6–8.5)

## 2024-12-19 NOTE — TELEPHONE ENCOUNTER
----- Message from Danette Solano sent at 12/18/2024  4:53 PM EST -----  Please notify Alia that her ELICIA was negative.

## 2024-12-23 ENCOUNTER — TELEPHONE (OUTPATIENT)
Dept: NEUROLOGY | Facility: CLINIC | Age: 59
End: 2024-12-23
Payer: COMMERCIAL

## 2024-12-23 ENCOUNTER — PATIENT MESSAGE (OUTPATIENT)
Dept: NEUROLOGY | Facility: CLINIC | Age: 59
End: 2024-12-23
Payer: COMMERCIAL

## 2024-12-23 NOTE — TELEPHONE ENCOUNTER
----- Message from Danette Solano sent at 12/19/2024  3:58 PM EST -----  Please notify Alia that her immunofixation blood work was normal.

## 2024-12-25 LAB — METHYLMALONATE SERPL-SCNC: 206 NMOL/L (ref 0–378)

## 2024-12-27 LAB — PYRIDOXAL PHOS SERPL-MCNC: 8.7 UG/L (ref 3.4–65.2)

## 2025-01-16 ENCOUNTER — TRANSCRIBE ORDERS (OUTPATIENT)
Dept: OBSTETRICS AND GYNECOLOGY | Facility: CLINIC | Age: 60
End: 2025-01-16
Payer: COMMERCIAL

## 2025-01-16 DIAGNOSIS — Z12.31 VISIT FOR SCREENING MAMMOGRAM: Primary | ICD-10-CM

## 2025-02-07 RX ORDER — METFORMIN HYDROCHLORIDE 500 MG/1
500 TABLET, EXTENDED RELEASE ORAL 2 TIMES DAILY WITH MEALS
Qty: 180 TABLET | Refills: 3 | OUTPATIENT
Start: 2025-02-07

## 2025-02-12 ENCOUNTER — TELEPHONE (OUTPATIENT)
Dept: OBSTETRICS AND GYNECOLOGY | Facility: CLINIC | Age: 60
End: 2025-02-12
Payer: COMMERCIAL

## 2025-02-12 NOTE — TELEPHONE ENCOUNTER
Pt called in requesting new prescription for medication :    estradiol (VIVELLE-DOT) 0.1 MG/24HR patch (12/28/2023)     Pt states she has ran out

## 2025-02-12 NOTE — TELEPHONE ENCOUNTER
Returned patient's call re: needing a refill on her estradiol patch. I informed pt that I could only refill a month's worth. Dr. Mac will rx the annual refill at her annual visit on 03/07/25. Pt voiced understanding.     Rx for estradiol 0.1 mg patch. Apply one patch to skin as directed by her provider, two times weekly. Dispense 8 patches with no refills, was called in and given to pharmacist at [place], [place] [phone].

## 2025-02-17 ENCOUNTER — TELEPHONE (OUTPATIENT)
Dept: NEUROLOGY | Facility: CLINIC | Age: 60
End: 2025-02-17
Payer: COMMERCIAL

## 2025-02-17 NOTE — TELEPHONE ENCOUNTER
Caller: VALORIE    Relationship: [employer]     Best call back number:   [phone]  [phone]    What orders are you requesting (i.e. lab or imaging): LUMBAR PUNCTURE    In what timeframe would the patient need to come in: ASPA    Where will you receive your lab/imaging services:   [facility]    Additional notes:   [employer] ([employer]) CALLED TO REQUEST ORDERS BE SENT TO [facility] [phone].

## 2025-02-17 NOTE — TELEPHONE ENCOUNTER
SPOKE WITH PATIENT TO LET HER KNOW THAT WE ARE REDIRECTING HER REFERRAL AS SHE REQUESTED TO [facility] AND SHE IS ALSO CANCELLING HER MRI APPOINTMENT WITH [facility]

## 2025-02-17 NOTE — TELEPHONE ENCOUNTER
Spoke with Lauren and let her know that a lumbar puncture had not been ordered but a MRI Lumbar Spine had been.  She stated she does not know why it says lp but they do have the MRI Lumbar spine requested to be sent to [facility]-which we verified that they do do imaging there.  I also verified fax number and will fax the order to that number.  I also verified if someone by the name of Sona Veena worked there as that was who had called and she did state they have someone there with that legal name.    Will route to referral coordinator to fax to new location.

## 2025-02-24 ENCOUNTER — OFFICE VISIT (OUTPATIENT)
Dept: FAMILY MEDICINE CLINIC | Facility: CLINIC | Age: 60
End: 2025-02-24
Payer: COMMERCIAL

## 2025-02-24 VITALS
WEIGHT: 182 LBS | OXYGEN SATURATION: 100 % | SYSTOLIC BLOOD PRESSURE: 122 MMHG | HEIGHT: 64 IN | BODY MASS INDEX: 31.07 KG/M2 | HEART RATE: 96 BPM | DIASTOLIC BLOOD PRESSURE: 78 MMHG

## 2025-02-24 DIAGNOSIS — R05.1 ACUTE COUGH: ICD-10-CM

## 2025-02-24 DIAGNOSIS — J10.1 INFLUENZA A: Primary | ICD-10-CM

## 2025-02-24 LAB
EXPIRATION DATE: ABNORMAL
FLUAV AG UPPER RESP QL IA.RAPID: DETECTED
FLUBV AG UPPER RESP QL IA.RAPID: NOT DETECTED
INTERNAL CONTROL: ABNORMAL
Lab: ABNORMAL
SARS-COV-2 AG UPPER RESP QL IA.RAPID: NOT DETECTED

## 2025-02-24 PROCEDURE — 87428 SARSCOV & INF VIR A&B AG IA: CPT | Performed by: PHYSICIAN ASSISTANT

## 2025-02-24 PROCEDURE — 99213 OFFICE O/P EST LOW 20 MIN: CPT | Performed by: PHYSICIAN ASSISTANT

## 2025-02-24 NOTE — PROGRESS NOTES
"Chief Complaint  Cough (Patient presents today for cough, congestion, bodyaches, headaches and bilateral ear pain. Was around someone with the Flu.)    Subjective        Alia Garza presents to [facility]  History of Present Illness  Patient in today for evaluation on cough and congestion symptoms that started 3 days ago. Denies fever but has had some body aches/chills yesterday. States was exposed to family member with flu A the day prior to getting sick.  States has had nausea- no vomiting or diarrhea.    Cough  This is a new problem. The current episode started in the past 7 days. Associated symptoms include chills. Pertinent negatives include no chest pain, fever, headaches, sore throat, shortness of breath or wheezing.       Review of Systems   Constitutional:  Positive for chills. Negative for fever.   HENT:  Positive for congestion. Negative for sore throat.    Respiratory:  Negative for shortness of breath and wheezing.    Cardiovascular:  Negative for chest pain.   Gastrointestinal:  Positive for nausea. Negative for abdominal pain, diarrhea and vomiting.   Neurological:  Negative for dizziness and headache.        Objective   Vital Signs:  /78   Pulse 96   Ht 162.6 cm (64\")   Wt 82.6 kg (182 lb)   SpO2 100%   BMI 31.24 kg/m²   Estimated body mass index is 31.24 kg/m² as calculated from the following:    Height as of this encounter: 162.6 cm (64\").    Weight as of this encounter: 82.6 kg (182 lb).            Physical Exam  Constitutional:       Appearance: Normal appearance.   HENT:      Right Ear: Tympanic membrane normal.      Left Ear: Tympanic membrane normal.      Mouth/Throat:      Pharynx: Oropharynx is clear.   Eyes:      Conjunctiva/sclera: Conjunctivae normal.      Pupils: Pupils are equal, round, and reactive to light.   Cardiovascular:      Rate and Rhythm: Normal rate and regular rhythm.      Heart sounds: Normal heart sounds.   Pulmonary:      " Effort: Pulmonary effort is normal.      Breath sounds: Normal breath sounds.   Abdominal:      Palpations: Abdomen is soft.      Tenderness: There is no abdominal tenderness.   Neurological:      Mental Status: She is oriented to person, place, and time.   Psychiatric:         Mood and Affect: Mood normal.         Behavior: Behavior normal.        Result Review :                Assessment and Plan   Diagnoses and all orders for this visit:    1. Influenza A (Primary)  Rapid flu A testing was positive; negative for Flu B and covid; recommend symptom management with expectorant as needed for cough and fever mgt if needed  along with good hydration and rest-- with already being nauseated and on day 3, will hold off on tamiflu; monitor symptoms closely and rtc or to ER for any acute progression /worsening of symptoms if needed   2. Acute cough  -     POCT SARS-CoV-2 Antigen STEPHANIE + Flu             Follow Up   No follow-ups on file.  Patient was given instructions and counseling regarding her condition or for health maintenance advice. Please see specific information pulled into the AVS if appropriate.

## 2025-03-04 ENCOUNTER — APPOINTMENT (OUTPATIENT)
Dept: MRI IMAGING | Facility: HOSPITAL | Age: 60
End: 2025-03-04
Payer: COMMERCIAL

## 2025-03-04 ENCOUNTER — HOSPITAL ENCOUNTER (OUTPATIENT)
Dept: NEUROLOGY | Facility: HOSPITAL | Age: 60
Discharge: HOME | End: 2025-03-04
Payer: COMMERCIAL

## 2025-03-04 DIAGNOSIS — R20.2 NUMBNESS AND TINGLING OF BOTH FEET: ICD-10-CM

## 2025-03-04 DIAGNOSIS — R20.0 NUMBNESS AND TINGLING OF BOTH FEET: ICD-10-CM

## 2025-03-04 PROCEDURE — 95911 NRV CNDJ TEST 9-10 STUDIES: CPT

## 2025-03-04 PROCEDURE — 95886 MUSC TEST DONE W/N TEST COMP: CPT

## 2025-03-07 ENCOUNTER — TELEPHONE (OUTPATIENT)
Dept: NEUROLOGY | Facility: CLINIC | Age: 60
End: 2025-03-07
Payer: COMMERCIAL

## 2025-03-07 ENCOUNTER — OFFICE VISIT (OUTPATIENT)
Dept: OBSTETRICS AND GYNECOLOGY | Facility: CLINIC | Age: 60
End: 2025-03-07
Payer: COMMERCIAL

## 2025-03-07 VITALS — BODY MASS INDEX: 32.1 KG/M2 | SYSTOLIC BLOOD PRESSURE: 138 MMHG | DIASTOLIC BLOOD PRESSURE: 98 MMHG | WEIGHT: 187 LBS

## 2025-03-07 DIAGNOSIS — Z01.419 WOMEN'S ANNUAL ROUTINE GYNECOLOGICAL EXAMINATION: Primary | ICD-10-CM

## 2025-03-07 DIAGNOSIS — N95.1 MENOPAUSAL SYMPTOMS: ICD-10-CM

## 2025-03-07 PROCEDURE — 99396 PREV VISIT EST AGE 40-64: CPT | Performed by: OBSTETRICS & GYNECOLOGY

## 2025-03-07 NOTE — PROGRESS NOTES
Gynecologic Annual Exam Note        GYN Annual Exam     CC - Here for annual exam.        HPI  Alia Garza is a 59 y.o. female, , who presents for annual well woman exam as a established patient.  She is s/p TLH/BSO in 2019 for endometriosis  and ovarian cysts.. Denies vaginal bleeding.   There were no changes to her medical or surgical history since her last visit. Marital Status: .  She is sexually active. She has not had new partners.. STD testing recommendations have been explained to the patient and she declines STD testing.    The patient would like to discuss the following complaints today: No concerns or complaints today.    Additional OB/GYN History   On HRT? Yes. Details: Estradiol 0.1 MG patch and     Last Pap : 23. Results: negative. HPV: unknown .   Last Completed Pap Smear       This patient has no relevant Health Maintenance data.          History of abnormal Pap smear: no  Family history of uterine, colon, breast, or ovarian cancer: yes - MGM and MA - breast CA  Performs monthly Self-Breast Exam: yes  Last mammogram: 2024. Done at . There is a copy in the chart.    Last Completed Mammogram            Scheduled - MAMMOGRAM (Every 2 Years) Scheduled for 3/10/2025      2024  Mammo Screening Digital Tomosynthesis Bilateral With CAD    10/31/2022  Mammo Screening Digital Tomosynthesis Bilateral With CAD    2021  Mammo Screening Digital Tomosynthesis Bilateral With CAD    2020  Mammo Screening Digital Tomosynthesis Bilateral With CAD    2019  Mammo Screening Digital Tomosynthesis Bilateral With CAD    Only the first 5 history entries have been loaded, but more history exists.                  Last colonoscopy: has had a colonoscopy 7 years ago    Last Completed Colonoscopy            COLORECTAL CANCER SCREENING (COLONOSCOPY - Every 10 Years) Tentatively due on 3/13/2028      2018  Colonoscopy    2018  COLONOSCOPY (Done - negative per  patient.)                    She has never had a bone density scan  Exercises Regularly: no  Feelings of Anxiety or Depression: no      Tobacco Usage?: No       Current Outpatient Medications:     Aspirin Buf,CaCarb-MgCarb-MgO, 81 MG tablet, Take 1 tablet by mouth., Disp: , Rfl:     atorvastatin (LIPITOR) 20 MG tablet, Take 1 tablet by mouth Daily., Disp: 90 tablet, Rfl: 3    cetirizine (zyrTEC) 10 MG tablet, Take 1 tablet by mouth Daily., Disp: 90 tablet, Rfl: 3    Cholecalciferol (Vitamin D-3) 125 MCG (5000 UT) tablet, Take 1 tablet by mouth Every Other Day., Disp: 1 tablet, Rfl: 0    cyclobenzaprine (FLEXERIL) 10 MG tablet, Take 1 tablet by mouth 2 (Two) Times a Day As Needed for Muscle Spasms., Disp: 180 tablet, Rfl: 3    cycloSPORINE (RESTASIS) 0.05 % ophthalmic emulsion, Administer 1 drop to both eyes 2 (Two) Times a Day., Disp: , Rfl:     EPINEPHrine (EPIPEN) 0.3 MG/0.3ML solution auto-injector injection, epinephrine 0.3 mg/0.3 mL injection, auto-injector, Disp: , Rfl:     estradiol (VIVELLE-DOT) 0.1 MG/24HR patch, Place 1 patch on the skin as directed by provider 2 (Two) Times a Week. Please use generic, Disp: 24 each, Rfl: 3    ferrous sulfate 140 (45 Fe) MG tablet controlled-release tablet, Take  by mouth Daily With Breakfast., Disp: , Rfl:     fluticasone (FLONASE) 50 MCG/ACT nasal spray, 2 sprays into the nostril(s) as directed by provider Daily., Disp: 48 g, Rfl: 3    ipratropium (ATROVENT) 0.06 % nasal spray, Administer 2 sprays into the nostril(s) as directed by provider., Disp: , Rfl:     losartan (Cozaar) 50 MG tablet, Take 1 tablet by mouth Daily., Disp: 90 tablet, Rfl: 2    loteprednol etabonate (LOTEMAX) 0.5 % gel ophthalmic gel, As Needed., Disp: , Rfl:     metFORMIN ER (GLUCOPHAGE-XR) 500 MG 24 hr tablet, Take 1 tablet by mouth 2 (Two) Times a Day With Meals., Disp: 180 tablet, Rfl: 3    Miebo 1.338 GM/ML solution, , Disp: , Rfl:     naproxen (NAPROSYN) 500 MG tablet, Take 1 tablet by mouth 2  (Two) Times a Day As Needed for Moderate Pain (inflammation)., Disp: 180 tablet, Rfl: 1    NON FORMULARY, Allergy shot weekly, Disp: , Rfl:     olopatadine (PATADAY) 0.2 % solution ophthalmic solution, Administer 1 drop to both eyes Daily., Disp: 7.5 mL, Rfl: 3    potassium chloride (KLOR-CON M20) 20 MEQ CR tablet, Take 1 tablet by mouth Daily., Disp: 90 tablet, Rfl: 3    rOPINIRole (REQUIP) 2 MG tablet, Take 1 tablet by mouth 2 (Two) Times a Day., Disp: 180 tablet, Rfl: 3    Semaglutide, 2 MG/DOSE, (OZEMPIC) 8 MG/3ML solution pen-injector, Inject 2 mg under the skin into the appropriate area as directed 1 (One) Time Per Week., Disp: 9 mL, Rfl: 3    spironolactone (ALDACTONE) 25 MG tablet, Take 1 tablet by mouth Daily., Disp: 90 tablet, Rfl: 3    SUMAtriptan (Imitrex) 20 MG/ACT nasal spray, 1 spray into the nostril(s) as directed by provider Every 2 (Two) Hours As Needed for Migraine., Disp: 1 each, Rfl: 11    torsemide (DEMADEX) 20 MG tablet, TAKE 1 TABLET BY MOUTH ONCE DAILY FOR  BLOOD  PRESSURE  AND  SWELLING.  THIS  REPLACES  HYDROCHLOROTHIAZIDE  25MG, Disp: 90 tablet, Rfl: 1    traZODone (DESYREL) 100 MG tablet, Take 1/2 to 1 po qhs prn insomnia, Disp: 90 tablet, Rfl: 3    Patient is requesting refills of estradiol patch for the year.    OB History          2    Para   2    Term   2            AB        Living             SAB        IAB        Ectopic        Molar        Multiple        Live Births   2                Past Medical History:   Diagnosis Date    Abnormal Pap smear of cervix     Allergic 2000    Anemia     PRENICIOUS    Angioedema     FROM LISINOPRIL    AR (allergic rhinitis)     Benign essential HTN     Carpal tunnel syndrome     Chronic fatigue syndrome     DM type 2 with diabetic dyslipidemia     Dyspareunia, female     Encounter for long-term (current) use of other medications 2022    Endometriosis     Fatty liver     Noted on CT scan    GERD (gastroesophageal reflux  disease)     Headache     Headache, tension-type     High risk medication use     Hyperglycemia     Hyperlipidemia     ASSOCIATED WITH TYPE 2 DIABETES MELLITUS    Hypertension     Iron deficiency anemia     Kidney stone 07/2022    Nonobstructive, seen on the left on CT scan of chest    Low iron     Lung nodule seen on imaging study 07/2022    Stable from 6 months prior, follow-up recommended in 1 year    Migraine     Obesity     Ovarian cyst 1995    Pernicious anemia     Polycystic disease, ovaries     Prediabetes     diet controlled    Pregnancy 2001    VAGINAL FORCEPS    Recurrent UTI     RLS (restless legs syndrome)     Sleep apnea     Type 2 diabetes mellitus without complication         Past Surgical History:   Procedure Laterality Date    BLADDER SURGERY      bladder rectum tacted     CARPAL TUNNEL RELEASE Right     COLONOSCOPY  2018    DIAGNOSTIC LAPAROSCOPY      EXPLORATORY LAPAROTOMY      x2 endo    EYE SURGERY  2015    LASIK  2015    OOPHORECTOMY      TLH/BSO 2019 for endo    TONSILLECTOMY      TOTAL LAPAROSCOPIC HYSTERECTOMY SALPINGO OOPHORECTOMY N/A 09/11/2019    Procedure: TOTAL LAPAROSCOPIC HYSTERECTOMY BILATERAL SALPINGECTOMY, OOPHORECTOMY, CYSTOSCOPY;  Surgeon: Faye Mac MD;  Location: [facility];  Service: Obstetrics/Gynecology    URETHRAL DILATION      x3-4    UVULECTOMY         Health Maintenance   Topic Date Due    URINE MICROALBUMIN-CREATININE RATIO (uACR)  Never done    DIABETIC EYE EXAM  01/02/2024    Hepatitis B (3 of 3 - 19+ 3-dose series) 10/21/2024    PAP SMEAR  12/28/2024    Annual Gynecologic Pelvic and Breast Exam  12/29/2024    HEMOGLOBIN A1C  02/26/2025    INFLUENZA VACCINE  03/31/2025 (Originally 7/1/2024)    COVID-19 Vaccine (3 - 2024-25 season) 05/16/2025 (Originally 9/1/2024)    ANNUAL PHYSICAL  08/26/2025    DIABETIC FOOT EXAM  08/26/2025    LIPID PANEL  08/26/2025    BMI FOLLOWUP  08/26/2025    MAMMOGRAM  01/08/2026    COLORECTAL CANCER SCREENING  03/13/2028    TDAP/TD  VACCINES (2 - Td or Tdap) 04/27/2028    HEPATITIS C SCREENING  Completed    Pneumococcal Vaccine 50+  Completed    ZOSTER VACCINE  Completed       The additional following portions of the patient's history were reviewed and updated as appropriate: allergies, current medications, past family history, past medical history, past social history, past surgical history, and problem list.    Review of Systems   All other systems reviewed and are negative.      I have reviewed and agree with the HPI, ROS, and historical information as entered above. .me    Objective   /98   Wt 84.8 kg (187 lb)   LMP 07/18/2019   BMI 32.10 kg/m²     Physical Exam  Vitals and nursing note reviewed. Exam conducted with a chaperone present.   Constitutional:       Appearance: She is well-developed.   HENT:      Head: Normocephalic and atraumatic.   Neck:      Thyroid: No thyroid mass or thyromegaly.   Cardiovascular:      Rate and Rhythm: Normal rate and regular rhythm.      Heart sounds: No murmur heard.  Pulmonary:      Effort: Pulmonary effort is normal. No retractions.      Breath sounds: Normal breath sounds. No wheezing, rhonchi or rales.   Chest:      Chest wall: No mass or tenderness.   Breasts:     Right: Normal. No mass, nipple discharge, skin change or tenderness.      Left: Normal. No mass, nipple discharge, skin change or tenderness.   Abdominal:      General: Bowel sounds are normal.      Palpations: Abdomen is soft. Abdomen is not rigid. There is no mass.      Tenderness: There is no abdominal tenderness. There is no guarding.      Hernia: No hernia is present. There is no hernia in the left inguinal area or right inguinal area.   Genitourinary:     General: Normal vulva.      Exam position: Lithotomy position.      Pubic Area: No rash.       Labia:         Right: No rash, tenderness or lesion.         Left: No rash, tenderness or lesion.       Urethra: No urethral pain or urethral swelling.      Vagina: Normal. No  vaginal discharge or lesions.      Uterus: Absent.       Adnexa:         Right: No mass, tenderness or fullness.          Left: No mass, tenderness or fullness.        Rectum: No external hemorrhoid.      Comments: Cervix surgically absent.  Vaginal cuff intact.  Musculoskeletal:      Cervical back: Normal range of motion. No muscular tenderness.   Neurological:      Mental Status: She is alert and oriented to person, place, and time.   Psychiatric:         Behavior: Behavior normal.            Assessment and Plan    Problem List Items Addressed This Visit    None  Visit Diagnoses       Women's annual routine gynecological examination    -  Primary    Relevant Orders    CBC & Differential    Comprehensive Metabolic Panel    Hemoglobin A1c    Lipid Panel    Vitamin D,25-Hydroxy    TSH    T4, Free    Menopausal symptoms                GYN annual well woman exam.   Reviewed monthly self breast exams.  Instructed to call with lumps, pain, or breast discharge.  Yearly mammograms ordered.  Ordered mammogram today.  Recommended use of Vitamin D and getting adequate calcium in her diet. (1500mg)  Reviewed exercise as a preventative health measures.   Reviewed BMI and weight loss as preventative health measures.   Colonoscopy recommended.  Reviewed risks of ERT including increased risk of breast cancer, increased blood clots, increased heart disease.  Patient strongly desires to stay on or start ERT.  She understands we will use the lowest amount that adequately controls her symptoms.  RTC in 1 year or PRN with problems.  No follow-ups on file.         Faye Mac MD  03/07/2025

## 2025-03-07 NOTE — TELEPHONE ENCOUNTER
----- Message from Danette Solano sent at 3/4/2025  2:09 PM EST -----  Please notify Alia that her EMG shows mild peripheral neuropathy, no evidence for radiculopathy is seen. We can review further at her follow up appointment with me on 3/28, please let me know if she has any concerns in the meantime, thank you!

## 2025-03-08 LAB
25(OH)D3+25(OH)D2 SERPL-MCNC: 91.1 NG/ML (ref 30–100)
ALBUMIN SERPL-MCNC: 3.7 G/DL (ref 3.5–5.2)
ALBUMIN/GLOB SERPL: 1.3 G/DL
ALP SERPL-CCNC: 125 U/L (ref 39–117)
ALT SERPL-CCNC: 32 U/L (ref 1–33)
AST SERPL-CCNC: 26 U/L (ref 1–32)
BASOPHILS # BLD AUTO: 0.02 10*3/MM3 (ref 0–0.2)
BASOPHILS NFR BLD AUTO: 0.2 % (ref 0–1.5)
BILIRUB SERPL-MCNC: 0.4 MG/DL (ref 0–1.2)
BUN SERPL-MCNC: 15 MG/DL (ref 6–20)
BUN/CREAT SERPL: 20.5 (ref 7–25)
CALCIUM SERPL-MCNC: 9.3 MG/DL (ref 8.6–10.5)
CHLORIDE SERPL-SCNC: 103 MMOL/L (ref 98–107)
CHOLEST SERPL-MCNC: 99 MG/DL (ref 0–200)
CO2 SERPL-SCNC: 26.8 MMOL/L (ref 22–29)
CREAT SERPL-MCNC: 0.73 MG/DL (ref 0.57–1)
EGFRCR SERPLBLD CKD-EPI 2021: 96.1 ML/MIN/1.73
EOSINOPHIL # BLD AUTO: 0.09 10*3/MM3 (ref 0–0.4)
EOSINOPHIL NFR BLD AUTO: 1.1 % (ref 0.3–6.2)
ERYTHROCYTE [DISTWIDTH] IN BLOOD BY AUTOMATED COUNT: 12.8 % (ref 12.3–15.4)
GLOBULIN SER CALC-MCNC: 2.9 GM/DL
GLUCOSE SERPL-MCNC: 76 MG/DL (ref 65–99)
HBA1C MFR BLD: 5.5 % (ref 4.8–5.6)
HCT VFR BLD AUTO: 45.4 % (ref 34–46.6)
HDLC SERPL-MCNC: 39 MG/DL (ref 40–60)
HGB BLD-MCNC: 15.1 G/DL (ref 12–15.9)
IMM GRANULOCYTES # BLD AUTO: 0.04 10*3/MM3 (ref 0–0.05)
IMM GRANULOCYTES NFR BLD AUTO: 0.5 % (ref 0–0.5)
LDLC SERPL CALC-MCNC: 44 MG/DL (ref 0–100)
LYMPHOCYTES # BLD AUTO: 1.99 10*3/MM3 (ref 0.7–3.1)
LYMPHOCYTES NFR BLD AUTO: 24 % (ref 19.6–45.3)
MCH RBC QN AUTO: 28.8 PG (ref 26.6–33)
MCHC RBC AUTO-ENTMCNC: 33.3 G/DL (ref 31.5–35.7)
MCV RBC AUTO: 86.6 FL (ref 79–97)
MONOCYTES # BLD AUTO: 0.69 10*3/MM3 (ref 0.1–0.9)
MONOCYTES NFR BLD AUTO: 8.3 % (ref 5–12)
NEUTROPHILS # BLD AUTO: 5.47 10*3/MM3 (ref 1.7–7)
NEUTROPHILS NFR BLD AUTO: 65.9 % (ref 42.7–76)
NRBC BLD AUTO-RTO: 0 /100 WBC (ref 0–0.2)
PLATELET # BLD AUTO: 241 10*3/MM3 (ref 140–450)
POTASSIUM SERPL-SCNC: 4 MMOL/L (ref 3.5–5.2)
PROT SERPL-MCNC: 6.6 G/DL (ref 6–8.5)
RBC # BLD AUTO: 5.24 10*6/MM3 (ref 3.77–5.28)
SODIUM SERPL-SCNC: 141 MMOL/L (ref 136–145)
T4 FREE SERPL-MCNC: 1.26 NG/DL (ref 0.92–1.68)
TRIGL SERPL-MCNC: 80 MG/DL (ref 0–150)
TSH SERPL DL<=0.005 MIU/L-ACNC: 1.06 UIU/ML (ref 0.27–4.2)
VLDLC SERPL CALC-MCNC: 16 MG/DL (ref 5–40)
WBC # BLD AUTO: 8.3 10*3/MM3 (ref 3.4–10.8)

## 2025-03-10 ENCOUNTER — HOSPITAL ENCOUNTER (OUTPATIENT)
Dept: MAMMOGRAPHY | Facility: HOSPITAL | Age: 60
Discharge: HOME | End: 2025-03-10
Admitting: OBSTETRICS & GYNECOLOGY
Payer: COMMERCIAL

## 2025-03-10 DIAGNOSIS — Z12.31 VISIT FOR SCREENING MAMMOGRAM: ICD-10-CM

## 2025-03-10 LAB — REF LAB TEST METHOD: NORMAL

## 2025-03-10 PROCEDURE — 77063 BREAST TOMOSYNTHESIS BI: CPT

## 2025-03-10 PROCEDURE — 77067 SCR MAMMO BI INCL CAD: CPT

## 2025-03-13 ENCOUNTER — TELEPHONE (OUTPATIENT)
Dept: OBSTETRICS AND GYNECOLOGY | Facility: CLINIC | Age: 60
End: 2025-03-13
Payer: COMMERCIAL

## 2025-03-13 DIAGNOSIS — N95.1 MENOPAUSAL SYMPTOMS: ICD-10-CM

## 2025-03-13 RX ORDER — ESTRADIOL 0.1 MG/D
1 FILM, EXTENDED RELEASE TRANSDERMAL 2 TIMES WEEKLY
Qty: 8 EACH | Refills: 0 | Status: SHIPPED | OUTPATIENT
Start: 2025-03-13

## 2025-03-13 NOTE — TELEPHONE ENCOUNTER
Called patient she stated she just saw OP on Friday and did not get refill of estrogen patch OP is out of town so I sent in refill for one month. Pending other rx for rest of year for him to approve when he returns

## 2025-03-18 ENCOUNTER — OFFICE VISIT (OUTPATIENT)
Dept: FAMILY MEDICINE CLINIC | Facility: CLINIC | Age: 60
End: 2025-03-18
Payer: COMMERCIAL

## 2025-03-18 VITALS
BODY MASS INDEX: 30.88 KG/M2 | HEIGHT: 64 IN | WEIGHT: 180.9 LBS | SYSTOLIC BLOOD PRESSURE: 124 MMHG | HEART RATE: 76 BPM | DIASTOLIC BLOOD PRESSURE: 82 MMHG | OXYGEN SATURATION: 98 %

## 2025-03-18 DIAGNOSIS — K21.9 GASTROESOPHAGEAL REFLUX DISEASE WITHOUT ESOPHAGITIS: ICD-10-CM

## 2025-03-18 DIAGNOSIS — Z23 ENCOUNTER FOR IMMUNIZATION: ICD-10-CM

## 2025-03-18 DIAGNOSIS — Z79.899 ENCOUNTER FOR LONG-TERM (CURRENT) USE OF HIGH-RISK MEDICATION: ICD-10-CM

## 2025-03-18 DIAGNOSIS — E11.42 TYPE 2 DIABETES MELLITUS WITH PERIPHERAL NEUROPATHY: Primary | ICD-10-CM

## 2025-03-18 DIAGNOSIS — I10 BENIGN ESSENTIAL HYPERTENSION: ICD-10-CM

## 2025-03-18 DIAGNOSIS — K76.0 FATTY LIVER: ICD-10-CM

## 2025-03-18 DIAGNOSIS — G25.81 RESTLESS LEGS: ICD-10-CM

## 2025-03-18 DIAGNOSIS — G47.33 OBSTRUCTIVE SLEEP APNEA SYNDROME: ICD-10-CM

## 2025-03-18 DIAGNOSIS — E78.2 MIXED HYPERLIPIDEMIA: ICD-10-CM

## 2025-03-18 LAB
EXPIRATION DATE: NORMAL
Lab: NORMAL
POC ALBUMIN, URINE: 10 MG/L
POC CREATININE, URINE: 300 MG/DL
POC URINE ALB/CREA RATIO: 30

## 2025-03-18 RX ORDER — METFORMIN HYDROCHLORIDE 500 MG/1
500 TABLET, EXTENDED RELEASE ORAL 2 TIMES DAILY WITH MEALS
Qty: 180 TABLET | Refills: 3 | Status: SHIPPED | OUTPATIENT
Start: 2025-03-18

## 2025-03-18 RX ORDER — NAPROXEN 500 MG/1
500 TABLET ORAL 2 TIMES DAILY PRN
Qty: 180 TABLET | Refills: 1 | Status: SHIPPED | OUTPATIENT
Start: 2025-03-18

## 2025-03-18 RX ORDER — CYCLOBENZAPRINE HCL 10 MG
10 TABLET ORAL 2 TIMES DAILY PRN
Qty: 180 TABLET | Refills: 3 | Status: SHIPPED | OUTPATIENT
Start: 2025-03-18

## 2025-03-24 NOTE — PROGRESS NOTES
"Chief Complaint  Med Refill (Pt is here for a medication recheck today. ), Diabetes, Hypertension, and Hyperlipidemia    Subjective      Alia Garza presents to [facility]  Diabetes  Pertinent negatives for hypoglycemia include no dizziness, nervousness/anxiousness, seizures or tremors. Pertinent negatives for diabetes include no blurred vision, no chest pain, no polydipsia, no polyphagia, no polyuria, no weakness and no weight loss.   Hypertension  Pertinent negatives include no blurred vision, chest pain, neck pain, palpitations or shortness of breath.   Hyperlipidemia  Pertinent negatives include no chest pain, myalgias or shortness of breath.     Patient is here for 6-month follow-up on diabetes hypertension and other chronic issues.  She will be seeing her eye doctor for annual exam next month.  She has been seeing neurology lately about some neuropathy symptoms in her feet, and that is unclear if those are related to her diabetes or her previous lumbar spine issues, so she is then sent for EMG/NCV and we will follow-up with them about the results.  I have also ordered a lumbar MRI because she has been having some increased low back pain when first standing up, although she denies any saddle anesthesia or significant leg weakness currently.  Other than those things, she says that her other medical problems have been stable.  Objective   Vital Signs:   Vitals:    03/18/25 0946   BP: 124/82   Pulse: 76   SpO2: 98%   Weight: 82.1 kg (180 lb 14.4 oz)   Height: 162.6 cm (64\")      /82   Pulse 76   Ht 162.6 cm (64\")   Wt 82.1 kg (180 lb 14.4 oz)   SpO2 98%   BMI 31.05 kg/m²     Body mass index is 31.05 kg/m².    Review of Systems   Constitutional:  Negative for chills, fever and unexpected weight loss.   HENT:  Negative for ear discharge, ear pain, mouth sores, nosebleeds, rhinorrhea, sinus pressure, sore throat, swollen glands and trouble swallowing.    Eyes:  Negative for " blurred vision, double vision, pain, redness and visual disturbance.   Respiratory:  Negative for cough, choking, chest tightness, shortness of breath and wheezing.    Cardiovascular:  Negative for chest pain, palpitations and leg swelling.        PND, orthopnea   Gastrointestinal:  Negative for abdominal distention, abdominal pain, blood in stool, constipation, diarrhea, nausea, vomiting and GERD.        Dysphagia, odynophagia   Endocrine: Negative for polydipsia, polyphagia and polyuria.   Genitourinary:  Negative for dysuria, hematuria and urinary incontinence.   Musculoskeletal:  Positive for back pain. Negative for arthralgias (unusual/atypica), gait problem, joint swelling, myalgias and neck pain.   Skin:  Negative for rash, skin lesions (worrisome/suspicious) and bruise.   Allergic/Immunologic: Negative for food allergies.   Neurological:  Positive for numbness. Negative for dizziness, tremors, seizures, syncope, weakness, light-headedness and headache.   Hematological:  Negative for adenopathy. Does not bruise/bleed easily.   Psychiatric/Behavioral:  Negative for suicidal ideas and depressed mood. The patient is not nervous/anxious.        Past History:  Medical History: has a past medical history of Abnormal Pap smear of cervix, Allergic (2000), Anemia, Angioedema (2008), AR (allergic rhinitis), Benign essential HTN, Carpal tunnel syndrome, Chronic fatigue syndrome, DM type 2 with diabetic dyslipidemia, Dyspareunia, female, Encounter for long-term (current) use of other medications (08/19/2022), Endometriosis, Fatty liver (2022), GERD (gastroesophageal reflux disease), Headache, Headache, tension-type, High risk medication use, Hyperglycemia, Hyperlipidemia, Hypertension, Iron deficiency anemia, Kidney stone (07/2022), Low iron, Lung nodule seen on imaging study (07/2022), Migraine, Obesity, Ovarian cyst (1995), Pernicious anemia, Polycystic disease, ovaries, Prediabetes, Pregnancy (2001), Recurrent UTI,  RLS (restless legs syndrome), Sleep apnea, and Type 2 diabetes mellitus without complication.   Surgical History: has a past surgical history that includes Tonsillectomy; Colonoscopy (2018); Bladder surgery; Exploratory laparotomy; Carpal tunnel release (Right); LASIK (2015); total laparoscopic hysterectomy salpingo oophorectomy (N/A, 09/11/2019); Oophorectomy; Urethral dilation; Uvulectomy; Laparoscopy; and Eye surgery (2015).   Family History: family history includes ADD / ADHD in her son; Alcohol abuse in her maternal grandfather; Anxiety disorder in her mother; Arthritis in her mother; Breast cancer (age of onset: 39) in her maternal aunt; Breast cancer (age of onset: 92) in her maternal grandmother; COPD in her father; Cancer in her maternal aunt, maternal grandmother, and paternal grandfather; Diabetes in her father, maternal aunt, maternal aunt, maternal uncle, mother, and other family members; Heart attack in her father; Hyperlipidemia in her father; Hypertension in her father; Migraines in her mother; Other in her maternal grandmother, mother, sister, and sister; Prostate cancer in her paternal grandfather; Thyroid disease in her mother.   Social History: reports that she quit smoking about 35 years ago. Her smoking use included cigarettes. She started smoking about 38 years ago. She has a 3 pack-year smoking history. She has been exposed to tobacco smoke. She has never used smokeless tobacco. She reports that she does not drink alcohol and does not use drugs.      Current Outpatient Medications:     Aspirin Buf,CaCarb-MgCarb-MgO, 81 MG tablet, Take 1 tablet by mouth., Disp: , Rfl:     atorvastatin (LIPITOR) 20 MG tablet, Take 1 tablet by mouth Daily., Disp: 90 tablet, Rfl: 3    cetirizine (zyrTEC) 10 MG tablet, Take 1 tablet by mouth Daily., Disp: 90 tablet, Rfl: 3    Cholecalciferol (Vitamin D-3) 125 MCG (5000 UT) tablet, Take 1 tablet by mouth Every Other Day., Disp: 1 tablet, Rfl: 0    cyclobenzaprine  (FLEXERIL) 10 MG tablet, Take 1 tablet by mouth 2 (Two) Times a Day As Needed for Muscle Spasms., Disp: 180 tablet, Rfl: 3    cycloSPORINE (RESTASIS) 0.05 % ophthalmic emulsion, Administer 1 drop to both eyes 2 (Two) Times a Day., Disp: , Rfl:     EPINEPHrine (EPIPEN) 0.3 MG/0.3ML solution auto-injector injection, epinephrine 0.3 mg/0.3 mL injection, auto-injector, Disp: , Rfl:     estradiol (VIVELLE-DOT) 0.1 MG/24HR patch, Place 1 patch on the skin as directed by provider 2 (Two) Times a Week. Please use generic, Disp: 8 each, Rfl: 0    ferrous sulfate 140 (45 Fe) MG tablet controlled-release tablet, Take  by mouth Daily With Breakfast., Disp: , Rfl:     fluticasone (FLONASE) 50 MCG/ACT nasal spray, 2 sprays into the nostril(s) as directed by provider Daily., Disp: 48 g, Rfl: 3    ipratropium (ATROVENT) 0.06 % nasal spray, Administer 2 sprays into the nostril(s) as directed by provider., Disp: , Rfl:     losartan (Cozaar) 50 MG tablet, Take 1 tablet by mouth Daily., Disp: 90 tablet, Rfl: 2    loteprednol etabonate (LOTEMAX) 0.5 % gel ophthalmic gel, As Needed., Disp: , Rfl:     metFORMIN ER (GLUCOPHAGE-XR) 500 MG 24 hr tablet, Take 1 tablet by mouth 2 (Two) Times a Day With Meals., Disp: 180 tablet, Rfl: 3    Miebo 1.338 GM/ML solution, , Disp: , Rfl:     naproxen (NAPROSYN) 500 MG tablet, Take 1 tablet by mouth 2 (Two) Times a Day As Needed for Moderate Pain (inflammation)., Disp: 180 tablet, Rfl: 1    NON FORMULARY, Allergy shot weekly, Disp: , Rfl:     olopatadine (PATADAY) 0.2 % solution ophthalmic solution, Administer 1 drop to both eyes Daily., Disp: 7.5 mL, Rfl: 3    potassium chloride (KLOR-CON M20) 20 MEQ CR tablet, Take 1 tablet by mouth Daily., Disp: 90 tablet, Rfl: 3    rOPINIRole (REQUIP) 2 MG tablet, Take 1 tablet by mouth 2 (Two) Times a Day., Disp: 180 tablet, Rfl: 3    Semaglutide, 2 MG/DOSE, (OZEMPIC) 8 MG/3ML solution pen-injector, Inject 2 mg under the skin into the appropriate area as directed  1 (One) Time Per Week., Disp: 9 mL, Rfl: 3    spironolactone (ALDACTONE) 25 MG tablet, Take 1 tablet by mouth Daily., Disp: 90 tablet, Rfl: 3    SUMAtriptan (Imitrex) 20 MG/ACT nasal spray, 1 spray into the nostril(s) as directed by provider Every 2 (Two) Hours As Needed for Migraine., Disp: 1 each, Rfl: 11    traZODone (DESYREL) 100 MG tablet, Take 1/2 to 1 po qhs prn insomnia, Disp: 90 tablet, Rfl: 3    Allergies: Lisinopril and Ace inhibitors    Physical Exam  Constitutional:       General: She is not in acute distress.     Appearance: She is obese. She is not toxic-appearing.   HENT:      Head: Normocephalic and atraumatic.      Right Ear: Ear canal and external ear normal.      Left Ear: Ear canal and external ear normal.      Nose: Nose normal.      Mouth/Throat:      Mouth: Mucous membranes are moist.      Pharynx: Oropharynx is clear.   Eyes:      General: No scleral icterus.     Extraocular Movements: Extraocular movements intact.      Conjunctiva/sclera: Conjunctivae normal.      Pupils: Pupils are equal, round, and reactive to light.   Neck:      Vascular: No carotid bruit.   Cardiovascular:      Rate and Rhythm: Normal rate and regular rhythm.      Pulses: Normal pulses.      Heart sounds: Normal heart sounds.   Pulmonary:      Effort: Pulmonary effort is normal.      Breath sounds: Normal breath sounds.   Chest:      Chest wall: No tenderness.   Abdominal:      General: Bowel sounds are normal. There is no distension.      Palpations: Abdomen is soft.      Tenderness: There is no abdominal tenderness. There is no guarding or rebound.   Musculoskeletal:         General: No swelling or deformity. Normal range of motion.      Cervical back: Normal range of motion. No rigidity.      Right lower leg: No edema.      Left lower leg: No edema.   Lymphadenopathy:      Cervical: No cervical adenopathy.   Skin:     General: Skin is warm and dry.      Capillary Refill: Capillary refill takes less than 2 seconds.       Coloration: Skin is not pale.      Findings: No erythema or rash.   Neurological:      General: No focal deficit present.      Mental Status: She is alert and oriented to person, place, and time.      Cranial Nerves: No cranial nerve deficit.      Motor: No weakness.      Coordination: Coordination normal.      Gait: Gait normal.   Psychiatric:         Mood and Affect: Mood normal.         Behavior: Behavior normal.         Thought Content: Thought content normal.         Judgment: Judgment normal.                   Assessment and Plan   Diagnoses and all orders for this visit:    1. Type 2 diabetes mellitus with peripheral neuropathy (Primary)  -     POC Albumin/Creatinine Ratio Urine  Diabetes has been well-controlled with metformin extended release 500 mg twice a day, which is her maximum tolerated dose, along with Ozempic 2 mg weekly and she will continue.  She has symptoms of peripheral neuropathy but we are awaiting confirmation by neurology and nerve conduction studies.  Denies any open wounds or sores on the feet  2. Benign essential hypertension    3. Restless legs    4. Obstructive sleep apnea syndrome  Patient is using CPAP and benefiting from this  5. Mixed hyperlipidemia    6. Gastroesophageal reflux disease without esophagitis    7. Fatty liver    8. Encounter for long-term (current) use of high-risk medication    9. Encounter for immunization  -     Hepatitis B 18+ 2 Dose Vaccine (HEPLISAV-B)  Patient apparently never got the last dose of her hepatitis B vaccine so this was given today, and she will continue current medications and we will check labs.  Other orders  -     Cancel: Hepatitis B Vaccine Adult IM (ENGERIX/RECOMBIVAX)  -     cyclobenzaprine (FLEXERIL) 10 MG tablet; Take 1 tablet by mouth 2 (Two) Times a Day As Needed for Muscle Spasms.  Dispense: 180 tablet; Refill: 3  -     metFORMIN ER (GLUCOPHAGE-XR) 500 MG 24 hr tablet; Take 1 tablet by mouth 2 (Two) Times a Day With Meals.  Dispense:  180 tablet; Refill: 3  -     naproxen (NAPROSYN) 500 MG tablet; Take 1 tablet by mouth 2 (Two) Times a Day As Needed for Moderate Pain (inflammation).  Dispense: 180 tablet; Refill: 1            Follow Up   Return in about 6 months (around 9/18/2025) for Annual physical.  Patient was given instructions and counseling regarding her condition or for health maintenance advice. Please see specific information pulled into the AVS if appropriate.     Jhon Edmond MD

## 2025-03-28 ENCOUNTER — OFFICE VISIT (OUTPATIENT)
Dept: NEUROLOGY | Facility: CLINIC | Age: 60
End: 2025-03-28
Payer: COMMERCIAL

## 2025-03-28 VITALS — OXYGEN SATURATION: 97 % | SYSTOLIC BLOOD PRESSURE: 118 MMHG | DIASTOLIC BLOOD PRESSURE: 80 MMHG | HEART RATE: 74 BPM

## 2025-03-28 DIAGNOSIS — M54.41 CHRONIC MIDLINE LOW BACK PAIN WITH RIGHT-SIDED SCIATICA: ICD-10-CM

## 2025-03-28 DIAGNOSIS — G60.8 PERIPHERAL SENSORY NEUROPATHY: Primary | ICD-10-CM

## 2025-03-28 DIAGNOSIS — G89.29 CHRONIC MIDLINE LOW BACK PAIN WITH RIGHT-SIDED SCIATICA: ICD-10-CM

## 2025-03-28 DIAGNOSIS — G25.81 RESTLESS LEGS: ICD-10-CM

## 2025-03-28 RX ORDER — ROPINIROLE 2 MG/1
3 TABLET, FILM COATED ORAL NIGHTLY
Qty: 45 TABLET | Refills: 2 | Status: SHIPPED | OUTPATIENT
Start: 2025-03-28

## 2025-03-28 NOTE — PROGRESS NOTES
Neuro Office Visit      Encounter Date: 2025   Patient Name: Alia Garza  : 1965   MRN: [id number]   PCP:  Jhon Edmond MD     Chief Complaint:    Chief Complaint   Patient presents with    Numbness and tingling of both feet       History of Present Illness: Alia Garza is a 59 y.o. female who is here today in Neurology for  peripheral polyneuropathy    Initial visit 2024:  PMH of allergic rhinitis, HTN, headaches, migraine, pernicious anemia, RLS, sleep apnea, T2DM with peripheral neuropathy, mixed hyperlipidemia, GERD, kidney stones, morbid obesity, vitamin d deficiency, b12 deficiency    Lab per PCP from 2024 showed vitamin B12 of 535, A1c was 5.6 (prior to that it was 7.5 on 2024), folate 5.2    In clinic today Alia notes numbness in her toes, when she starts ambulating she will feel pain in her feet, she also notes low back pain, she reports concern for right sciatic nerve pain, she notes that sitting a certain way can trigger the pain. Numbness in her feet has been present for at least 6 months, sciatic nerve pain has been going on intermittently for about 6 months. She describes the numbness in her feet as a pins/needles sensation. Does not keep her awake at night. She also notes that rubbing her feet helps with pain on the side of her feet. She reports heavier alcohol use in her 20's. She reports that low back pain is worse when standing for long periods and when walking. She notes a brusied area on her low back that has been present for a long time, no known injury.     She also has RLS for which she takes Requip 2 mg, she takes it BID.   Rarely takes Trazodone.   She rarely takes Flexeril and will sometimes take Naproxen for low back pain.     No weakness, no falls, notes occasionally she can get off balance  No numbness/tingling in her hands.      Current visit 3/28/2025:  Alia Garza returns to neurology clinic for routine follow up of numbness/tingling in her feet.  [address] [facility] ED  Emergency Department Encounter  Emergency Medicine Attending Note     Pt Name: Qasim Owusu  MRN: [id number]  [place] 1996   Date of evaluation: 10/30/2020  PCP:  No primary care provider on file. CHIEF COMPLAINT       Chief Complaint   Patient presents with    Shortness of Breath     pt reports cannot breath out of left lung    Chest Pain     left sided       HISTORY OF PRESENT ILLNESS  (Location/Symptom, Timing/Onset, Context/Setting, Quality, Duration, Modifying Factors, Severity.)      Qasim Owusu is a 27 y.o. male who presents with left sided rib pain. Patient is a [employer]  and states that he bent over towards to right to  his phone that fell and he felt a popping sensation and he now has severe left sided chest wall pain. No significant shortness of breath. No myalgias. Patient has no significant history of cardiac disease. No family history of cardiac disease. No hypertension no hyperlipidemia and does not smoke. PAST MEDICAL / SURGICAL / SOCIAL / FAMILY HISTORY     Past Medical History: reviewed with patient and none reported. Past Surgical History: reviewed with patient and none reported. Allergies:  Shrimp extract allergy skin test and Wasp venom protein     Home Meds:   Prior to Visit Medications    Medication Sig Taking? Authorizing Provider   lidocaine (LIDODERM) 5 % Place 1 patch onto the skin daily 12 hours on, 12 hours off. Yes Aster Sosa MD   ibuprofen (ADVIL;MOTRIN) 800 MG tablet Take 1 tablet by mouth every 8 hours as needed for Pain Yes Aster Sosa MD   amphetamine-dextroamphetamine (ADDERALL XR) 15 MG extended release capsule Take 1 capsule by mouth every morning for 30 days.   Julienne Almonte MD   montelukast (SINGULAIR) 10 MG tablet Take 1 tablet by mouth daily  Julienne Almonte MD   amphetamine-dextroamphetamine (ADDERALL XR) 15 MG extended release capsule Take 1 capsule by EMG (3/4/2025) showed peripheral neuropathy, chiefly sensory, mild.  No electrophysiologic evidence for radiculopathy is seen in either leg.  Lab work from 12/17/2024 ELICIA was negative, immunofixation was negative, no M spike, vitamin B6 was 8.7, methylmalonic acid was 206. Right sided sciatica. Mild low back pain. MRI lumbar spine on Monday with BGO. Numbness in toes on the plantar aspect, into the ball of her feet, has not progressed between visits. Right hand surgery for carpal tunnel in 2019 with some residual numbness, numbness in left hand as well. Notes that at times her  is not the best, she is right handed. Did feel that carpal tunnel surgery was helpful. Mild intermittent neck pain, not persistent. She is going to start PT for her low back, she also notes right sided sciatic pain. Hemoglobin A1c was 5.50 on 3/8/2025.   History of bladder tacking previously and some associated urinary urgency.  Numbness is not painful, will sometimes have pain in her feet when she first goes to start walking which improves as she keeps walking.   She also reports longstanding history of RLS which is worse in the evenings, she is prescribed Requip 2 mg BID.     Subjective      Review of Systems   Constitutional: Negative.    HENT: Negative.     Eyes: Negative.    Respiratory: Negative.     Cardiovascular: Negative.    Gastrointestinal: Negative.    Genitourinary:  Positive for urgency.   Musculoskeletal:  Positive for back pain.   Skin:         Notes old bruised appearing area on lower back without known injury   Neurological:  Positive for numbness. Negative for weakness.        RLS   Psychiatric/Behavioral:  Negative for sleep disturbance.           Past Medical History:   Past Medical History:   Diagnosis Date    Abnormal Pap smear of cervix     Allergic 2000    Anemia     PRENICIOUS    Angioedema 2008    FROM LISINOPRIL    AR (allergic rhinitis)     Benign essential HTN     Carpal tunnel syndrome     Chronic fatigue  mouth every morning for 30 days. Loral Climronaldo, APRN - CNP   amphetamine-dextroamphetamine (ADDERALL XR) 15 MG extended release capsule Take 1 capsule by mouth daily for 30 days. Loral Climes, APRN - CNP   amphetamine-dextroamphetamine (ADDERALL XR) 15 MG extended release capsule Take 1 capsule by mouth daily for 30 days. Loral Climes, APRN - CNP   ondansetron (ZOFRAN) 4 MG tablet Take 1 tablet by mouth every 8 hours as needed for Nausea or Vomiting  Edie Rivera MD     Please note that medications prescribed at discharge will auto-populate into this medication list when note is refreshed. Please look at prescription date andprescriber to clarify. Family History:family history is not on file. Social History: He reports that he has never smoked. He has never used smokeless tobacco. He reports current alcohol use. He reports current drug use. Drug: Marijuana. He reports being sexually active. REVIEW OF SYSTEMS    (2-9 systems for level 4, 10 or more for level 5)      Review of Systems   Constitutional: Negative for chills and fever. HENT: Negative for congestion and sore throat. Respiratory: Positive for shortness of breath. Negative for cough and stridor. Cardiovascular: Positive for chest pain. Gastrointestinal: Negative for nausea and vomiting. Musculoskeletal: Negative for arthralgias and myalgias. Skin: Negative for rash and wound. Neurological: Negative for dizziness and numbness. PHYSICAL EXAM   (up to 7 for level 4, 8 or more for level 5)      Initial Vitals   ED Triage Vitals [10/30/20 0153]   BP Temp Temp Source Pulse Resp SpO2 Height Weight   119/74 98.4 °F (36.9 °C) Oral 100 17 98 % -- --       Physical Exam  Vitals signs and nursing note reviewed. Constitutional:       General: He is not in acute distress. Appearance: He is well-developed. HENT:      Head: Normocephalic and atraumatic.       Mouth/Throat:      Comments: Patient is currently wearing a syndrome     DM type 2 with diabetic dyslipidemia     Dyspareunia, female     Encounter for long-term (current) use of other medications 08/19/2022    Endometriosis     Fatty liver 2022    Noted on CT scan    GERD (gastroesophageal reflux disease)     Headache     Headache, tension-type     High risk medication use     Hyperglycemia     Hyperlipidemia     ASSOCIATED WITH TYPE 2 DIABETES MELLITUS    Hypertension     Iron deficiency anemia     Kidney stone 07/2022    Nonobstructive, seen on the left on CT scan of chest    Low iron     Lung nodule seen on imaging study 07/2022    Stable from 6 months prior, follow-up recommended in 1 year    Migraine     Obesity     Ovarian cyst 1995    Pernicious anemia     Polycystic disease, ovaries     Prediabetes     diet controlled    Pregnancy 2001    VAGINAL FORCEPS    Recurrent UTI     RLS (restless legs syndrome)     Sleep apnea     Type 2 diabetes mellitus without complication        Past Surgical History:   Past Surgical History:   Procedure Laterality Date    BLADDER SURGERY      bladder rectum tacted     CARPAL TUNNEL RELEASE Right     COLONOSCOPY  2018    DIAGNOSTIC LAPAROSCOPY      EXPLORATORY LAPAROTOMY      x2 endo    EYE SURGERY  2015    LASIK  2015    OOPHORECTOMY      TLH/BSO 2019 for endo    TONSILLECTOMY      TOTAL LAPAROSCOPIC HYSTERECTOMY SALPINGO OOPHORECTOMY N/A 09/11/2019    Procedure: TOTAL LAPAROSCOPIC HYSTERECTOMY BILATERAL SALPINGECTOMY, OOPHORECTOMY, CYSTOSCOPY;  Surgeon: Faye Mac MD;  Location: [facility];  Service: Obstetrics/Gynecology    URETHRAL DILATION      x3-4    UVULECTOMY         Family History:   Family History   Problem Relation Age of Onset    Migraines Mother     Thyroid disease Mother     Anxiety disorder Mother     Arthritis Mother     Diabetes Mother     Other Mother         Osteoporosis, Migraines    Hypertension Father     Hyperlipidemia Father     Heart attack Father     COPD Father     Diabetes Father     Breast cancer  Maternal Grandmother 92    Cancer Maternal Grandmother         Breast cancer    Other Maternal Grandmother         Osteoporosis    Alcohol abuse Maternal Grandfather     Prostate cancer Paternal Grandfather     Cancer Paternal Grandfather         Prostate cancer    Breast cancer Maternal Aunt 39    Diabetes Other     Diabetes Other     ADD / ADHD Son     Cancer Maternal Aunt         breast cancer    Diabetes Maternal Aunt     Diabetes Maternal Aunt     Diabetes Maternal Uncle     Other Sister         Gallbladder/Gall stones, Migraines    Other Sister         Migraines    Ovarian cancer Neg Hx     Endometrial cancer Neg Hx     Uterine cancer Neg Hx     Colon cancer Neg Hx        Social History:   Social History     Socioeconomic History    Marital status:    Tobacco Use    Smoking status: Former     Current packs/day: 0.00     Average packs/day: 0.7 packs/day for 4.5 years (3.0 ttl pk-yrs)     Types: Cigarettes     Start date: 9/10/1986     Quit date: 9/10/1989     Years since quittin.5     Passive exposure: Past    Smokeless tobacco: Never    Tobacco comments:     No longer smoke   Vaping Use    Vaping status: Never Used   Substance and Sexual Activity    Alcohol use: No    Drug use: No    Sexual activity: Yes     Partners: Male     Birth control/protection: None, Hysterectomy       Medications:     Current Outpatient Medications:     Aspirin Buf,CaCarb-MgCarb-MgO, 81 MG tablet, Take 1 tablet by mouth., Disp: , Rfl:     atorvastatin (LIPITOR) 20 MG tablet, Take 1 tablet by mouth Daily., Disp: 90 tablet, Rfl: 3    cetirizine (zyrTEC) 10 MG tablet, Take 1 tablet by mouth Daily., Disp: 90 tablet, Rfl: 3    Cholecalciferol (Vitamin D-3) 125 MCG (5000 UT) tablet, Take 1 tablet by mouth Every Other Day., Disp: 1 tablet, Rfl: 0    cyclobenzaprine (FLEXERIL) 10 MG tablet, Take 1 tablet by mouth 2 (Two) Times a Day As Needed for Muscle Spasms., Disp: 180 tablet, Rfl: 3    cycloSPORINE (RESTASIS) 0.05 % ophthalmic  display    RADIOLOGY: All plain film, CT, MRI, and formal ultrasound images (except ED bedside ultrasound) are read by the radiologist, see reports below, unless otherwise noted in E D Course, MDM or here. Xr Chest (2 Vw)    Result Date: 10/30/2020  EXAMINATION: TWO XRAY VIEWS OF THE CHEST 10/30/2020 2:45 am COMPARISON: None. HISTORY: ORDERING SYSTEM PROVIDED HISTORY: left rib pain, likely subluxed. TECHNOLOGIST PROVIDED HISTORY: left rib pain, likely subluxed. Reason for Exam: left rib pain, likely subluxed Acuity: Acute Type of Exam: Initial Mechanism of Injury: Pain with inspiration left anterolateral chest.  Left rib pain, likely subluxed. Pt states bent over and took a deep breath in and felt pain Additional signs and symptoms: Pain with inspiration left anterolateral chest.  Left rib pain, likely subluxed. Pt states bent over and took a deep breath in and felt pain Relevant Medical/Surgical History: Pain with inspiration left anterolateral chest.  Left rib pain, likely subluxed. Pt states bent over and took a deep breath in and felt pain FINDINGS: The heart is normal in size and configuration. The mediastinal contours are within normal limits. The lungs are well aerated. The pleural surfaces are normal and no evidence of a pleural effusion is seen. Bones and soft tissues are unremarkable. Unremarkable radiographic views of the chest.     BEDSIDE ULTRASOUND:  Not clinically indicated at this time. ED COURSE      ED Medication Orders (From admission, onward)    Start Ordered     Status Ordering Provider    10/30/20 0230 10/30/20 0226  ibuprofen (ADVIL;MOTRIN) tablet 800 mg  ONCE      Last MAR action:  Given - by Ten Lopez on 10/30/20 at [facility] [address]          EMERGENCYDEPARTMENT COURSE:    Patient's x-ray negative for any acute findings. Likely musculoskeletal.  Discussed with him and his mother they are comfortable with this plan. Follow-up primary care.   Will not list is given clinic list.  Return if any concerns arise. PROCEDURES:  None     CONSULTS:  None    CRITICAL CARE  None     FINAL IMPRESSION      1. Rib pain          DISPOSITION / PLAN     DISPOSITION Decision To Discharge 10/30/2020 03:17:32 AM      PATIENT REFERRED TO:  Your Primary Care Provider  If you do not have one, please see clinic list below.   Schedule an appointment as soon as possible for a visit in 1 week      [facility] ED  [address] [facility] [address]  [phone]  Go to   As needed, If symptoms worsen      DISCHARGE MEDICATIONS:  Discharge Medication List as of 10/30/2020  3:21 AM      START taking these medications    Details   lidocaine (LIDODERM) 5 % Place 1 patch onto the skin daily 12 hours on, 12 hours off., Disp-7 patch,R-0Print      ibuprofen (ADVIL;MOTRIN) 800 MG tablet Take 1 tablet by mouth every 8 hours as needed for Pain, Disp-30 tablet,R-0Print             Aster Sosa MD  Emergency Medicine Attending      (Please note that portions of this note were completed with a voice recognition program.  Efforts were made to edit the dictations but occasionallywords are mis-transcribed.)          Aster Sosa MD  11/02/20 0233 emulsion, Administer 1 drop to both eyes 2 (Two) Times a Day., Disp: , Rfl:     EPINEPHrine (EPIPEN) 0.3 MG/0.3ML solution auto-injector injection, epinephrine 0.3 mg/0.3 mL injection, auto-injector, Disp: , Rfl:     estradiol (VIVELLE-DOT) 0.1 MG/24HR patch, Place 1 patch on the skin as directed by provider 2 (Two) Times a Week. Please use generic, Disp: 8 each, Rfl: 0    ferrous sulfate 140 (45 Fe) MG tablet controlled-release tablet, Take  by mouth Daily With Breakfast., Disp: , Rfl:     fluticasone (FLONASE) 50 MCG/ACT nasal spray, 2 sprays into the nostril(s) as directed by provider Daily., Disp: 48 g, Rfl: 3    ipratropium (ATROVENT) 0.06 % nasal spray, Administer 2 sprays into the nostril(s) as directed by provider., Disp: , Rfl:     losartan (Cozaar) 50 MG tablet, Take 1 tablet by mouth Daily., Disp: 90 tablet, Rfl: 2    loteprednol etabonate (LOTEMAX) 0.5 % gel ophthalmic gel, As Needed., Disp: , Rfl:     metFORMIN ER (GLUCOPHAGE-XR) 500 MG 24 hr tablet, Take 1 tablet by mouth 2 (Two) Times a Day With Meals., Disp: 180 tablet, Rfl: 3    Miebo 1.338 GM/ML solution, , Disp: , Rfl:     naproxen (NAPROSYN) 500 MG tablet, Take 1 tablet by mouth 2 (Two) Times a Day As Needed for Moderate Pain (inflammation)., Disp: 180 tablet, Rfl: 1    NON FORMULARY, Allergy shot weekly, Disp: , Rfl:     olopatadine (PATADAY) 0.2 % solution ophthalmic solution, Administer 1 drop to both eyes Daily., Disp: 7.5 mL, Rfl: 3    potassium chloride (KLOR-CON M20) 20 MEQ CR tablet, Take 1 tablet by mouth Daily., Disp: 90 tablet, Rfl: 3    rOPINIRole (REQUIP) 2 MG tablet, Take 1.5 tablets by mouth Every Night., Disp: 45 tablet, Rfl: 2    Semaglutide, 2 MG/DOSE, (OZEMPIC) 8 MG/3ML solution pen-injector, Inject 2 mg under the skin into the appropriate area as directed 1 (One) Time Per Week., Disp: 9 mL, Rfl: 3    spironolactone (ALDACTONE) 25 MG tablet, Take 1 tablet by mouth Daily., Disp: 90 tablet, Rfl: 3    SUMAtriptan (Imitrex) 20  MG/ACT nasal spray, 1 spray into the nostril(s) as directed by provider Every 2 (Two) Hours As Needed for Migraine., Disp: 1 each, Rfl: 11    traZODone (DESYREL) 100 MG tablet, Take 1/2 to 1 po qhs prn insomnia, Disp: 90 tablet, Rfl: 3    Allergies:   Allergies   Allergen Reactions    Lisinopril Swelling    Ace Inhibitors Swelling     Swelling of lips, patient states her breathing had not been effected          Objective     Objective:    /80   Pulse 74   LMP 07/18/2019   SpO2 97%   There is no height or weight on file to calculate BMI.    Physical Exam  Vitals reviewed.   Constitutional:       Appearance: Normal appearance.   HENT:      Head: Normocephalic and atraumatic.      Mouth/Throat:      Mouth: Mucous membranes are moist.      Pharynx: Oropharynx is clear.   Pulmonary:      Effort: Pulmonary effort is normal. No respiratory distress.   Skin:     General: Skin is warm and dry.   Neurological:      Mental Status: She is alert.          Neurology Exam:    General apperance: NAD.     Mental status: Alert, awake and oriented to time place and person.    Fund of knowledge:  Normal.     Language and Speech: No aphasia or dysarthria.    Naming , Repetition and Comprehension:  Can name objects, repeat a sentence and follow commands. Speech is clear and fluent with good repetition, comprehension, and naming.    Cranial Nerves:   CN II: Visual fields are full. Intact. Pupils - PERRLA  CN III, IV and VI: Extraocular movements are intact. Normal saccades.   CN V: Facial sensation is intact.   CN VII: Muscles of facial expression reveal no asymmetry. Intact.   CN VIII: Hearing is intact.   CN IX and X: Palate elevates symmetrically. Intact  CN XI: Shoulder shrug is intact.   CN XII: Tongue is midline without evidence of atrophy or fasciculation.     Motor:  Right UE muscle strength 5/5. Normal tone.     Left UE muscle strength 5/5. Normal tone.      Right LE muscle strength 5/5. Normal tone.     Left LE muscle  strength 5/5. Normal tone.      Sensory: Reports equal sensation to light touch in her hands, Decreased sensation to light touch in bilateral feet at level of toes, vibratory sense intact in BLE, proprioception intact in BLE    DTRs: 2+ bilaterally in upper and lower extremities.    Coordination: Normal finger-to-nose    Gait: Normal. No assistive device          Results:   Imaging:   No Images in the past 120 days found..     Labs:   Lab Results   Component Value Date    GLUCOSE 76 03/07/2025    BUN 15 03/07/2025    CREATININE 0.73 03/07/2025     03/07/2025    K 4.0 03/07/2025     03/07/2025    CALCIUM 9.3 03/07/2025    PROTEINTOT 6.6 03/07/2025    ALBUMIN 3.7 03/07/2025    ALT 32 03/07/2025    AST 26 03/07/2025    ALKPHOS 125 (H) 03/07/2025    BILITOT 0.4 03/07/2025    GLOB 2.9 03/07/2025    AGRATIO 1.3 03/07/2025    BCR 20.5 03/07/2025    ANIONGAP 12.1 12/17/2024    EGFR 96.1 03/07/2025         Assessment / Plan      Assessment/Plan:   Diagnoses and all orders for this visit:    1. Peripheral sensory neuropathy (Primary)    2. Restless legs  -     rOPINIRole (REQUIP) 2 MG tablet; Take 1.5 tablets by mouth Every Night.  Dispense: 45 tablet; Refill: 2    3. Chronic midline low back pain with right-sided sciatica      Alia Garza returns to neurology clinic for routine follow up of numbness/tingling in her feet. EMG (3/4/2025) showed peripheral neuropathy, chiefly sensory, mild.  No electrophysiologic evidence for radiculopathy is seen in either leg. Lab work from 12/17/2024 ELICIA was negative, immunofixation was negative, no M spike, vitamin B6 was 8.7, methylmalonic acid was 206. Right sided sciatica.MRI lumbar spine on Monday with BGO. Numbness in toes on the plantar aspect, into the ball of her feet. She is going to start PT for her low back per BGO. Hemoglobin A1c was 5.50 on 3/8/2025.  We discussed that likely her numbness in her feet is coming from diabetic neuropathy, she is having upcoming MRI lumbar  spine to assess for any spinal changes that could also be contributing. Goal Hemoglobin A1c <7. We also discussed consideration for Gabapentin, Lyrica, or Cymbalta for symptom control, she did not wish to add any medications today. We also discussed RLS, she has been on Requip 2 mg BID for many years, concern that some of her RLS symptoms may be due to augmentation, she has been advised to adjust dose to Requip 3 mg in the evening and to stop taking AM dose. She is to contact clinic in 2 weeks with response, we also discussed that adding Lyrica could be beneficial for RLS symptoms. She did not wish to add Lyrica at this time. Will plan to see back in clinic in 3 months or sooner for any concerns.       Patient Education:       Reviewed medications, potential side effects and signs and symptoms to report. Discussed risk versus benefits of treatment plan with patient and/or family-including medications, labs and radiology that may be ordered. Addressed questions and concerns during visit. Patient and/or family verbalized understanding and agree with plan.     Follow Up:   Return in about 3 months (around 6/28/2025).    I spent 35 minutes in the care of this patient. I personally spent 50 percent of this time counseling and discussing diagnostic testing, evaluation, treatment options, and management .       During this visit the following were done:  Labs Reviewed [x]    Labs Ordered []    Radiology Reports Reviewed []    Radiology Ordered []    PCP Records Reviewed []    Referring Provider Records Reviewed []    ER Records Reviewed []    Hospital Records Reviewed []    History Obtained From Family []    Radiology Images Reviewed []    Other Reviewed []    Records Requested []      KRISTINE Bernard  [facility] NEUROLOGY  [address] [address] [phone]

## 2025-04-07 ENCOUNTER — TELEPHONE (OUTPATIENT)
Dept: OBSTETRICS AND GYNECOLOGY | Facility: CLINIC | Age: 60
End: 2025-04-07
Payer: COMMERCIAL

## 2025-04-07 DIAGNOSIS — N95.1 MENOPAUSAL SYMPTOMS: ICD-10-CM

## 2025-04-07 RX ORDER — ESTRADIOL 0.1 MG/D
1 FILM, EXTENDED RELEASE TRANSDERMAL 2 TIMES WEEKLY
Qty: 24 EACH | Refills: 4 | Status: SHIPPED | OUTPATIENT
Start: 2025-04-07

## 2025-04-07 NOTE — TELEPHONE ENCOUNTER
Pt called and stated she is out of her estradiol (VIVELLE-DOT) 0.1 MG/24HR patch and would like to know if she can get a refill of this for 3 months

## 2025-06-30 RX ORDER — TIRZEPATIDE 5 MG/.5ML
5 INJECTION, SOLUTION SUBCUTANEOUS WEEKLY
Qty: 4 ML | Refills: 0 | Status: SHIPPED | OUTPATIENT
Start: 2025-06-30

## 2025-08-06 RX ORDER — SPIRONOLACTONE 25 MG/1
25 TABLET ORAL DAILY
Qty: 90 TABLET | Refills: 3 | Status: SHIPPED | OUTPATIENT
Start: 2025-08-06

## 2025-08-18 RX ORDER — TIRZEPATIDE 7.5 MG/.5ML
INJECTION, SOLUTION SUBCUTANEOUS
Qty: 6 ML | Refills: 1 | Status: SHIPPED | OUTPATIENT
Start: 2025-08-18

## 2025-08-25 RX ORDER — LOSARTAN POTASSIUM 50 MG/1
50 TABLET ORAL DAILY
Qty: 90 TABLET | Refills: 0 | Status: SHIPPED | OUTPATIENT
Start: 2025-08-25

## (undated) DEVICE — MANIP UTER RUMI TP 6.7MMX8CM BLU

## (undated) DEVICE — GLV SURG TRIUMPH CLASSIC PF LTX 7.5 STRL

## (undated) DEVICE — ABSORBABLE SINGLE STITCH RELOAD: Brand: POLYSORB

## (undated) DEVICE — ENDOPATH 10MM ENDOSCOPIC BLUNT CHERRY DISSECTORS (12 POUCHES CONTAINING 3 DISSECTORS EACH): Brand: ENDOPATH

## (undated) DEVICE — SUT VIC 0 TIES 18IN J912G

## (undated) DEVICE — COVER,LIGHT HANDLE,FLX,1/PK: Brand: MEDLINE INDUSTRIES, INC.

## (undated) DEVICE — LAB CORP FIT LEUR FEM CELLDYN 1/16IN ID FOR SAPPHIRE

## (undated) DEVICE — ANTIBACTERIAL UNDYED BRAIDED (POLYGLACTIN 910), SYNTHETIC ABSORBABLE SUTURE: Brand: COATED VICRYL

## (undated) DEVICE — 3M(TM) TEGADERM(TM) TRANSPARENT FILM DRESSING FRAME STYLE 1622W: Brand: 3M™ TEGADERM™

## (undated) DEVICE — BNDG ADHS PLSTC 1X3IN LF

## (undated) DEVICE — GLV SURG SIGNATURE TOUCH PF LTX 6 STRL

## (undated) DEVICE — OCCL COLPO PNEUMO  STRL

## (undated) DEVICE — CYSTO/BLADDER IRRIGATION SET, REGULATING CLAMP

## (undated) DEVICE — PK LAP HYST 10

## (undated) DEVICE — APPL CHLORAPREP W/ALC 26ML CLR

## (undated) DEVICE — KT POSTN TRENDELENBURG DLX WING PAD TABL STRAP HDRST XL 1P/U

## (undated) DEVICE — 3 RING SUTURE PASSER - 16 CM: Brand: 3 RING SUTURE PASSER - 16 CM

## (undated) DEVICE — GLV SURG TRIUMPH CLASSIC PF LTX 8 STRL

## (undated) DEVICE — ENDOPATH XCEL BLADELESS TROCARS WITH STABILITY SLEEVES: Brand: ENDOPATH XCEL

## (undated) DEVICE — LAP PORT CLOSURE GUIDES 5MM AND 10/12MM: Brand: LAP PORT CLOSURE GUIDES 5MM AND 10/12MM

## (undated) DEVICE — ADHS LIQ MASTISOL 2/3ML

## (undated) DEVICE — HDRST POSTIN FM CRDL TRACH SLOT 9X8X4IN

## (undated) DEVICE — VIOLET POLYDIOXANONE POLYMER, SYNTHETIC ABSORBABLE SUTURE CLIPS: Brand: LAPRA-TY

## (undated) DEVICE — ENDOPATH XCEL UNIVERSAL TROCAR STABLILITY SLEEVES: Brand: ENDOPATH XCEL

## (undated) DEVICE — SKIN AFFIX SURG ADHESIVE 72/CS 0.55ML: Brand: MEDLINE

## (undated) DEVICE — FLTR PLUMEPORT LAP W/CONN STRL

## (undated) DEVICE — SUTURING DEVICE: Brand: ENDO STITCH

## (undated) DEVICE — SYR LUERLOK 50ML

## (undated) DEVICE — 3M™ STERI-STRIP™ REINFORCED ADHESIVE SKIN CLOSURES, R1547, 1/2 IN X 4 IN (12 MM X 100 MM), 6 STRIPS/ENVELOPE: Brand: 3M™ STERI-STRIP™

## (undated) DEVICE — ENDOCUT SCISSOR TIP, DISPOSABLE: Brand: RENEW